# Patient Record
Sex: FEMALE | Race: WHITE | URBAN - METROPOLITAN AREA
[De-identification: names, ages, dates, MRNs, and addresses within clinical notes are randomized per-mention and may not be internally consistent; named-entity substitution may affect disease eponyms.]

---

## 2023-04-05 ENCOUNTER — NURSING HOME VISIT (OUTPATIENT)
Dept: POST ACUTE CARE | Facility: EXTERNAL LOCATION | Age: 88
End: 2023-04-05
Payer: COMMERCIAL

## 2023-04-05 DIAGNOSIS — R60.0 EDEMA OF RIGHT LOWER EXTREMITY: ICD-10-CM

## 2023-04-05 DIAGNOSIS — M79.604 RIGHT LEG PAIN: ICD-10-CM

## 2023-04-05 PROCEDURE — 99308 SBSQ NF CARE LOW MDM 20: CPT | Performed by: INTERNAL MEDICINE

## 2023-04-05 NOTE — LETTER
Patient: Ruth Wheatley  : 10/30/1929    Encounter Date: 2023    PROGRESS NOTE    Subjective  Chief complaint: Ruth Wheatley is a 89 y.o. female who is a long term care patient being seen and evaluated for RLE pain    HPI:  Patient had US of RLE due to c/o pain and edema. Us negative for DVT. She continues to have pain and edema. She continues Tylenol as needed which is effective, and uses Compression stockings. No other concerns today.       Objective  Vital signs: 129/75, 72, 18, 96%    Physical Exam  Constitutional:       General: She is not in acute distress.  Eyes:      Extraocular Movements: Extraocular movements intact.   Cardiovascular:      Rate and Rhythm: Regular rhythm.   Pulmonary:      Effort: Pulmonary effort is normal.      Breath sounds: Normal breath sounds.   Abdominal:      General: Bowel sounds are normal.      Palpations: Abdomen is soft.   Musculoskeletal:         General: Normal range of motion.      Cervical back: Neck supple.      Right lower leg: Edema present.      Left lower leg: No edema.      Comments: +pain, RLE   Neurological:      Mental Status: She is alert.   Psychiatric:         Mood and Affect: Mood normal.         Behavior: Behavior is cooperative.         Assessment/Plan  Problem List Items Addressed This Visit          Musculoskeletal    Right leg pain     PRN Tylenol         Edema of right lower extremity     Compression stockings  Elevate as tolerated          Medications, treatments, and labs reviewed  Continue medications and treatments as listed in [facility]    Scribe Attestation  By signing my name below, ITahmina Scribe   attest that this documentation has been prepared under the direction and in the presence of Corrie Desouza MD.    Provider Attestation - Scribe documentation  All medical record entries made by the Scribe were at my direction and personally dictated by me. I have reviewed the chart and agree that the record accurately reflects my  personal performance of the history, physical exam, discussion and plan.      Electronically Signed By: Corrie Desouza MD   4/7/23 12:43 PM

## 2023-04-06 PROBLEM — R60.0 EDEMA OF RIGHT LOWER EXTREMITY: Status: ACTIVE | Noted: 2023-04-06

## 2023-04-06 PROBLEM — M79.604 RIGHT LEG PAIN: Status: ACTIVE | Noted: 2023-04-06

## 2023-04-06 NOTE — PROGRESS NOTES
PROGRESS NOTE    Subjective   Chief complaint: Ruth Wheatley is a 89 y.o. female who is a long term care patient being seen and evaluated for RLE pain    HPI:  Patient had US of RLE due to c/o pain and edema. Us negative for DVT. She continues to have pain and edema. She continues Tylenol as needed which is effective, and uses Compression stockings. No other concerns today.       Objective   Vital signs: 129/75, 72, 18, 96%    Physical Exam  Constitutional:       General: She is not in acute distress.  Eyes:      Extraocular Movements: Extraocular movements intact.   Cardiovascular:      Rate and Rhythm: Regular rhythm.   Pulmonary:      Effort: Pulmonary effort is normal.      Breath sounds: Normal breath sounds.   Abdominal:      General: Bowel sounds are normal.      Palpations: Abdomen is soft.   Musculoskeletal:         General: Normal range of motion.      Cervical back: Neck supple.      Right lower leg: Edema present.      Left lower leg: No edema.      Comments: +pain, RLE   Neurological:      Mental Status: She is alert.   Psychiatric:         Mood and Affect: Mood normal.         Behavior: Behavior is cooperative.         Assessment/Plan   Problem List Items Addressed This Visit          Musculoskeletal    Right leg pain     PRN Tylenol         Edema of right lower extremity     Compression stockings  Elevate as tolerated          Medications, treatments, and labs reviewed  Continue medications and treatments as listed in [facility]    Scribe Attestation  By signing my name below, I, Leslye Yost   attest that this documentation has been prepared under the direction and in the presence of Corrie Desouza MD.    Provider Attestation - Scribe documentation  All medical record entries made by the Scribe were at my direction and personally dictated by me. I have reviewed the chart and agree that the record accurately reflects my personal performance of the history, physical exam, discussion and plan.

## 2023-04-07 ENCOUNTER — NURSING HOME VISIT (OUTPATIENT)
Dept: POST ACUTE CARE | Facility: EXTERNAL LOCATION | Age: 88
End: 2023-04-07
Payer: COMMERCIAL

## 2023-04-07 DIAGNOSIS — F32.A DEPRESSION, UNSPECIFIED DEPRESSION TYPE: ICD-10-CM

## 2023-04-07 DIAGNOSIS — I10 PRIMARY HYPERTENSION: ICD-10-CM

## 2023-04-07 DIAGNOSIS — J43.9 PULMONARY EMPHYSEMA, UNSPECIFIED EMPHYSEMA TYPE (MULTI): ICD-10-CM

## 2023-04-07 PROCEDURE — 99309 SBSQ NF CARE MODERATE MDM 30: CPT | Performed by: INTERNAL MEDICINE

## 2023-04-07 NOTE — LETTER
Patient: Ruth Wheatley  : 10/30/1929    Encounter Date: 2023    PROGRESS NOTE    Subjective  Chief complaint: Ruth Wheatley is a 89 y.o. female who is a long term care patient being seen and evaluated for monthly general medical care and follow-up.    HPI:   patient presents for general medical care and f/u.  Patient seen and examined at bedside.  No issues per nursing.  Patient has no acute complaints.   denies sob, wheezing, cough.  HTN BP at goal.  Denies chest pain and headache.    Patient with diagnosis of depression.  Denies feeling down and thoughts of harming self or others.  No acute distress.      Objective  Vital signs: 129/75, 72, 18, 96%    Physical Exam  Constitutional:       General: She is not in acute distress.  Eyes:      Extraocular Movements: Extraocular movements intact.   Pulmonary:      Effort: Pulmonary effort is normal.      Breath sounds: Normal breath sounds.   Abdominal:      General: Bowel sounds are normal.      Palpations: Abdomen is soft.   Musculoskeletal:      Cervical back: Neck supple.      Right lower leg: Edema present.      Left lower leg: Edema present.   Neurological:      Mental Status: She is alert.   Psychiatric:         Mood and Affect: Mood normal.         Behavior: Behavior is cooperative.         Assessment/Plan  Problem List Items Addressed This Visit          Respiratory    Emphysema lung (CMS/HCC)     Stable  Continue Breo inhalers            Circulatory    Primary hypertension     Monitor BP  Continue antihypertensives            Other    Depression     Mood stable  Continue antihypertensives          Medications, treatments, and labs reviewed  Continue medications and treatments as listed in [facility]    Scribe Attestation  By signing my name below, Tahmina GUSTAFSON, Scribe   attest that this documentation has been prepared under the direction and in the presence of Corrie Desouza MD.    Provider Attestation - Scribe documentation  All medical record  entries made by the Scribe were at my direction and personally dictated by me. I have reviewed the chart and agree that the record accurately reflects my personal performance of the history, physical exam, discussion and plan.      Electronically Signed By: Corrie Desouza MD   4/11/23  6:36 PM

## 2023-04-11 PROBLEM — F32.A DEPRESSION: Status: ACTIVE | Noted: 2023-04-11

## 2023-04-11 PROBLEM — I10 PRIMARY HYPERTENSION: Status: ACTIVE | Noted: 2023-04-11

## 2023-04-11 PROBLEM — J43.9 EMPHYSEMA LUNG (MULTI): Status: ACTIVE | Noted: 2023-04-11

## 2023-04-11 NOTE — PROGRESS NOTES
PROGRESS NOTE    Subjective   Chief complaint: Ruth Wheatley is a 89 y.o. female who is a long term care patient being seen and evaluated for monthly general medical care and follow-up.    HPI:   patient presents for general medical care and f/u.  Patient seen and examined at bedside.  No issues per nursing.  Patient has no acute complaints.   denies sob, wheezing, cough.  HTN BP at goal.  Denies chest pain and headache.    Patient with diagnosis of depression.  Denies feeling down and thoughts of harming self or others.  No acute distress.      Objective   Vital signs: 129/75, 72, 18, 96%    Physical Exam  Constitutional:       General: She is not in acute distress.  Eyes:      Extraocular Movements: Extraocular movements intact.   Pulmonary:      Effort: Pulmonary effort is normal.      Breath sounds: Normal breath sounds.   Abdominal:      General: Bowel sounds are normal.      Palpations: Abdomen is soft.   Musculoskeletal:      Cervical back: Neck supple.      Right lower leg: Edema present.      Left lower leg: Edema present.   Neurological:      Mental Status: She is alert.   Psychiatric:         Mood and Affect: Mood normal.         Behavior: Behavior is cooperative.         Assessment/Plan   Problem List Items Addressed This Visit          Respiratory    Emphysema lung (CMS/HCC)     Stable  Continue Breo inhalers            Circulatory    Primary hypertension     Monitor BP  Continue antihypertensives            Other    Depression     Mood stable  Continue antihypertensives          Medications, treatments, and labs reviewed  Continue medications and treatments as listed in [facility]    Scribe Attestation  By signing my name below, Tahmina GUSTAFSON Scribe   attest that this documentation has been prepared under the direction and in the presence of Corrie Desouza MD.    Provider Attestation - Scribe documentation  All medical record entries made by the Scribe were at my direction and personally dictated by  me. I have reviewed the chart and agree that the record accurately reflects my personal performance of the history, physical exam, discussion and plan.

## 2023-04-12 ENCOUNTER — NURSING HOME VISIT (OUTPATIENT)
Dept: POST ACUTE CARE | Facility: EXTERNAL LOCATION | Age: 88
End: 2023-04-12
Payer: COMMERCIAL

## 2023-04-12 DIAGNOSIS — M25.551 RIGHT HIP PAIN: ICD-10-CM

## 2023-04-12 PROCEDURE — 99308 SBSQ NF CARE LOW MDM 20: CPT | Performed by: INTERNAL MEDICINE

## 2023-04-12 NOTE — LETTER
Patient: Ruth Wheatley  : 10/30/1929    Encounter Date: 2023    PROGRESS NOTE    Subjective  Chief complaint: Ruth Wheatley is a 89 y.o. female who is a long term care patient being seen and evaluated for  right hip pain    HPI:   patient presented with complaints of right hip pain.  X-ray obtained and negative.  Presently she reports that the pain is improved.   denies trauma or fall. No new issues or concerns.  No acute distress.      Objective  Vital signs:  127/76, 98%    Physical Exam  Cardiovascular:      Rate and Rhythm: Normal rate and regular rhythm.   Musculoskeletal:         General: Normal range of motion.      Cervical back: Normal range of motion.      Comments:  right hip +pain, improved         Assessment/Plan  Problem List Items Addressed This Visit          Musculoskeletal    Right hip pain     PRN Tylenol   improved, x-ray negative          Medications, treatments, and labs reviewed  Continue medications and treatments as listed in [facility]    Scribe Attestation  By signing my name below, ITahmina Scribe   attest that this documentation has been prepared under the direction and in the presence of Corrie Desouza MD.    Provider Attestation - Scribe documentation  All medical record entries made by the Scribe were at my direction and personally dictated by me. I have reviewed the chart and agree that the record accurately reflects my personal performance of the history, physical exam, discussion and plan.      Electronically Signed By: Corrie Desouza MD   23 12:04 PM

## 2023-04-16 PROBLEM — M25.551 RIGHT HIP PAIN: Status: ACTIVE | Noted: 2023-04-06

## 2023-04-16 NOTE — PROGRESS NOTES
PROGRESS NOTE    Subjective   Chief complaint: Ruth Wheatley is a 89 y.o. female who is a long term care patient being seen and evaluated for  right hip pain    HPI:   patient presented with complaints of right hip pain.  X-ray obtained and negative.  Presently she reports that the pain is improved.   denies trauma or fall. No new issues or concerns.  No acute distress.      Objective   Vital signs:  127/76, 98%    Physical Exam  Cardiovascular:      Rate and Rhythm: Normal rate and regular rhythm.   Musculoskeletal:         General: Normal range of motion.      Cervical back: Normal range of motion.      Comments:  right hip +pain, improved         Assessment/Plan   Problem List Items Addressed This Visit          Musculoskeletal    Right hip pain     PRN Tylenol   improved, x-ray negative          Medications, treatments, and labs reviewed  Continue medications and treatments as listed in [facility]    Scribe Attestation  By signing my name below, I, Leslye Yost   attest that this documentation has been prepared under the direction and in the presence of Corrie Desouza MD.    Provider Attestation - Scribe documentation  All medical record entries made by the Scribe were at my direction and personally dictated by me. I have reviewed the chart and agree that the record accurately reflects my personal performance of the history, physical exam, discussion and plan.

## 2023-05-05 ENCOUNTER — NURSING HOME VISIT (OUTPATIENT)
Dept: POST ACUTE CARE | Facility: EXTERNAL LOCATION | Age: 88
End: 2023-05-05
Payer: COMMERCIAL

## 2023-05-05 DIAGNOSIS — I10 PRIMARY HYPERTENSION: ICD-10-CM

## 2023-05-05 DIAGNOSIS — J43.9 PULMONARY EMPHYSEMA, UNSPECIFIED EMPHYSEMA TYPE (MULTI): ICD-10-CM

## 2023-05-05 DIAGNOSIS — F32.A DEPRESSION, UNSPECIFIED DEPRESSION TYPE: ICD-10-CM

## 2023-05-05 PROCEDURE — 99309 SBSQ NF CARE MODERATE MDM 30: CPT | Performed by: INTERNAL MEDICINE

## 2023-05-05 NOTE — LETTER
Patient: Ruth Wheatley  : 10/30/1929    Encounter Date: 2023    PROGRESS NOTE    Subjective  Chief complaint: Ruth Wheatley is a 89 y.o. female who is a long term care patient being seen and evaluated for monthly general medical care and follow-up.    HPI:   patient presents for general medical care and f/u.  Patient seen and examined at bedside.  No issues per nursing.  Patient has no acute complaints.   denies sob, wheezing, cough.  HTN BP at goal.  Denies chest pain and headache.    Patient with diagnosis of depression.  Denies feeling down and thoughts of harming self or others.  No acute distress.      Objective  Vital signs: 127/74, 96%    Physical Exam  Constitutional:       General: She is not in acute distress.  Eyes:      Extraocular Movements: Extraocular movements intact.   Pulmonary:      Effort: Pulmonary effort is normal.      Breath sounds: Normal breath sounds.   Abdominal:      General: Bowel sounds are normal.      Palpations: Abdomen is soft.   Musculoskeletal:      Cervical back: Neck supple.      Right lower leg: Edema present.      Left lower leg: Edema present.   Neurological:      Mental Status: She is alert.   Psychiatric:         Mood and Affect: Mood normal.         Behavior: Behavior is cooperative.         Assessment/Plan  Problem List Items Addressed This Visit          Respiratory    Emphysema lung (CMS/HCC)     Stable  Continue Breo inhalers            Circulatory    Primary hypertension     Monitor BP  Continue antihypertensives            Other    Depression     Mood stable  Continue antihypertensives        Medications, treatments, and labs reviewed  Continue medications and treatments as listed in [facility]    Scribe Attestation  By signing my name below, Tahmina GUSTAFSON, Leslye   attest that this documentation has been prepared under the direction and in the presence of Corrie Desouza MD.    Provider Attestation - Scribe documentation  All medical record entries made by  the Scribe were at my direction and personally dictated by me. I have reviewed the chart and agree that the record accurately reflects my personal performance of the history, physical exam, discussion and plan.      Electronically Signed By: Corrie Desouza MD   5/8/23  4:06 PM

## 2023-05-08 NOTE — PROGRESS NOTES
PROGRESS NOTE    Subjective   Chief complaint: Ruth Wheatley is a 89 y.o. female who is a long term care patient being seen and evaluated for monthly general medical care and follow-up.    HPI:   patient presents for general medical care and f/u.  Patient seen and examined at bedside.  No issues per nursing.  Patient has no acute complaints.   denies sob, wheezing, cough.  HTN BP at goal.  Denies chest pain and headache.    Patient with diagnosis of depression.  Denies feeling down and thoughts of harming self or others.  No acute distress.      Objective   Vital signs: 127/74, 96%    Physical Exam  Constitutional:       General: She is not in acute distress.  Eyes:      Extraocular Movements: Extraocular movements intact.   Pulmonary:      Effort: Pulmonary effort is normal.      Breath sounds: Normal breath sounds.   Abdominal:      General: Bowel sounds are normal.      Palpations: Abdomen is soft.   Musculoskeletal:      Cervical back: Neck supple.      Right lower leg: Edema present.      Left lower leg: Edema present.   Neurological:      Mental Status: She is alert.   Psychiatric:         Mood and Affect: Mood normal.         Behavior: Behavior is cooperative.         Assessment/Plan   Problem List Items Addressed This Visit          Respiratory    Emphysema lung (CMS/HCC)     Stable  Continue Breo inhalers            Circulatory    Primary hypertension     Monitor BP  Continue antihypertensives            Other    Depression     Mood stable  Continue antihypertensives        Medications, treatments, and labs reviewed  Continue medications and treatments as listed in [facility]    Scribe Attestation  By signing my name below, I, Leslye Yost   attest that this documentation has been prepared under the direction and in the presence of Corrie Desouza MD.    Provider Attestation - Scribe documentation  All medical record entries made by the Scribe were at my direction and personally dictated by me. I have  reviewed the chart and agree that the record accurately reflects my personal performance of the history, physical exam, discussion and plan.

## 2023-06-06 ENCOUNTER — NURSING HOME VISIT (OUTPATIENT)
Dept: POST ACUTE CARE | Facility: EXTERNAL LOCATION | Age: 88
End: 2023-06-06
Payer: MEDICARE

## 2023-06-06 DIAGNOSIS — R19.7 DIARRHEA, UNSPECIFIED TYPE: Primary | ICD-10-CM

## 2023-06-06 DIAGNOSIS — I10 PRIMARY HYPERTENSION: ICD-10-CM

## 2023-06-06 PROCEDURE — 99308 SBSQ NF CARE LOW MDM 20: CPT | Performed by: NURSE PRACTITIONER

## 2023-06-06 NOTE — LETTER
Patient: Ruth Wheatley  : 10/30/1929    Encounter Date: 2023    PROGRESS NOTE    Subjective  Chief complaint: Ruth Wheatley is a 89 y.o. female who is a long term care patient being seen and evaluated for diarrhea    HPI:  Patient presents with complaint of diarrhea.  She states that she is having multiple watery stools per day.  She is on Colace.  Patient denies abdominal pain nausea and vomiting.      Objective  Vital signs: 118/78, 17, 97.6, 78, 96%    Physical Exam  Constitutional:       General: She is not in acute distress.  Eyes:      Extraocular Movements: Extraocular movements intact.   Cardiovascular:      Rate and Rhythm: Normal rate.   Pulmonary:      Effort: Pulmonary effort is normal.   Abdominal:      General: Bowel sounds are normal. There is no distension.      Palpations: Abdomen is soft.      Tenderness: There is no abdominal tenderness.   Musculoskeletal:      Cervical back: Neck supple.      Right lower leg: No edema.      Left lower leg: No edema.   Neurological:      Mental Status: She is alert.   Psychiatric:         Mood and Affect: Mood normal.         Behavior: Behavior is cooperative.         Assessment/Plan  Problem List Items Addressed This Visit       Diarrhea - Primary     Obtain stool for C. difficile  Discontinue Colace  Start Questran  Obtain BMP and CBC         Primary hypertension     Controlled  Continue to monitor          Medications, treatments, and labs reviewed  Continue medications and treatments as listed in EMR    ISAURO Lorenzana      Electronically Signed By: ISAURO Lorenzana   23  4:47 PM

## 2023-06-07 ENCOUNTER — NURSING HOME VISIT (OUTPATIENT)
Dept: POST ACUTE CARE | Facility: EXTERNAL LOCATION | Age: 88
End: 2023-06-07
Payer: MEDICARE

## 2023-06-07 DIAGNOSIS — F32.A DEPRESSION, UNSPECIFIED DEPRESSION TYPE: ICD-10-CM

## 2023-06-07 DIAGNOSIS — I10 PRIMARY HYPERTENSION: ICD-10-CM

## 2023-06-07 DIAGNOSIS — R19.7 DIARRHEA, UNSPECIFIED TYPE: ICD-10-CM

## 2023-06-07 DIAGNOSIS — J43.9 PULMONARY EMPHYSEMA, UNSPECIFIED EMPHYSEMA TYPE (MULTI): ICD-10-CM

## 2023-06-07 PROCEDURE — 99309 SBSQ NF CARE MODERATE MDM 30: CPT | Performed by: INTERNAL MEDICINE

## 2023-06-07 NOTE — LETTER
Patient: Ruth Wheatley  : 10/30/1929    Encounter Date: 2023    PROGRESS NOTE    Subjective  Chief complaint: Ruth Wheatley is a 89 y.o. female who is a long term care patient being seen and evaluated for monthly general medical care and follow-up, loose stools    HPI:   patient presents for general medical care and f/u.  Patient seen and examined at bedside.  Nursing reports that patient has been having complaints of loose stools. Denies nausea, vomiting or fever.  She  denies sob, wheezing, cough.  HTN,  Denies chest pain and headache.    Patient with diagnosis of depression.  Denies feeling down and thoughts of harming self or others.  No acute distress.      Objective  Vital signs: 131/74, 96%    Physical Exam  Constitutional:       General: She is not in acute distress.  Eyes:      Extraocular Movements: Extraocular movements intact.   Pulmonary:      Effort: Pulmonary effort is normal.      Breath sounds: Normal breath sounds.   Abdominal:      General: Bowel sounds are normal.      Palpations: Abdomen is soft.   Musculoskeletal:      Cervical back: Neck supple.      Right lower leg: Edema present.      Left lower leg: Edema present.   Neurological:      Mental Status: She is alert.   Psychiatric:         Mood and Affect: Mood normal.         Behavior: Behavior is cooperative.         Assessment/Plan  Problem List Items Addressed This Visit          Respiratory    Emphysema lung (CMS/HCC)     Stable  Continue Breo inhalers            Circulatory    Primary hypertension     Controlled  Continue to monitor            Other    Depression     Mood stable  Continue antihypertensives         Diarrhea     Obtain stool for C. difficile  Discontinued Colace  Started Questran  Ordered BMP and CBC, pending        Medications, treatments, and labs reviewed  Continue medications and treatments as listed in [facility]    Scribe Attestation  By signing my name below, I, Tahmina Mazariegos, Scribe   attest that this  documentation has been prepared under the direction and in the presence of Corrie Desouza MD.    Provider Attestation - Scribe documentation  All medical record entries made by the Scribe were at my direction and personally dictated by me. I have reviewed the chart and agree that the record accurately reflects my personal performance of the history, physical exam, discussion and plan.      Electronically Signed By: Corrie Desouza MD   6/9/23  7:04 PM

## 2023-06-07 NOTE — PROGRESS NOTES
PROGRESS NOTE    Subjective   Chief complaint: Ruth Wheatley is a 89 y.o. female who is a long term care patient being seen and evaluated for diarrhea    HPI:  Patient presents with complaint of diarrhea.  She states that she is having multiple watery stools per day.  She is on Colace.  Patient denies abdominal pain nausea and vomiting.      Objective   Vital signs: 118/78, 17, 97.6, 78, 96%    Physical Exam  Constitutional:       General: She is not in acute distress.  Eyes:      Extraocular Movements: Extraocular movements intact.   Cardiovascular:      Rate and Rhythm: Normal rate.   Pulmonary:      Effort: Pulmonary effort is normal.   Abdominal:      General: Bowel sounds are normal. There is no distension.      Palpations: Abdomen is soft.      Tenderness: There is no abdominal tenderness.   Musculoskeletal:      Cervical back: Neck supple.      Right lower leg: No edema.      Left lower leg: No edema.   Neurological:      Mental Status: She is alert.   Psychiatric:         Mood and Affect: Mood normal.         Behavior: Behavior is cooperative.         Assessment/Plan   Problem List Items Addressed This Visit       Diarrhea - Primary     Obtain stool for C. difficile  Discontinue Colace  Start Questran  Obtain BMP and CBC         Primary hypertension     Controlled  Continue to monitor          Medications, treatments, and labs reviewed  Continue medications and treatments as listed in EMR    Gi Piña, APRN-CNP

## 2023-06-08 PROBLEM — R19.7 DIARRHEA: Status: ACTIVE | Noted: 2023-06-08

## 2023-06-09 NOTE — PROGRESS NOTES
PROGRESS NOTE    Subjective   Chief complaint: Ruth Wheatley is a 89 y.o. female who is a long term care patient being seen and evaluated for monthly general medical care and follow-up, loose stools    HPI:   patient presents for general medical care and f/u.  Patient seen and examined at bedside.  Nursing reports that patient has been having complaints of loose stools. Denies nausea, vomiting or fever.  She  denies sob, wheezing, cough.  HTN,  Denies chest pain and headache.    Patient with diagnosis of depression.  Denies feeling down and thoughts of harming self or others.  No acute distress.      Objective   Vital signs: 131/74, 96%    Physical Exam  Constitutional:       General: She is not in acute distress.  Eyes:      Extraocular Movements: Extraocular movements intact.   Pulmonary:      Effort: Pulmonary effort is normal.      Breath sounds: Normal breath sounds.   Abdominal:      General: Bowel sounds are normal.      Palpations: Abdomen is soft.   Musculoskeletal:      Cervical back: Neck supple.      Right lower leg: Edema present.      Left lower leg: Edema present.   Neurological:      Mental Status: She is alert.   Psychiatric:         Mood and Affect: Mood normal.         Behavior: Behavior is cooperative.         Assessment/Plan   Problem List Items Addressed This Visit          Respiratory    Emphysema lung (CMS/HCC)     Stable  Continue Breo inhalers            Circulatory    Primary hypertension     Controlled  Continue to monitor            Other    Depression     Mood stable  Continue antihypertensives         Diarrhea     Obtain stool for C. difficile  Discontinued Colace  Started Questran  Ordered BMP and CBC, pending        Medications, treatments, and labs reviewed  Continue medications and treatments as listed in [facility]    Scribe Attestation  By signing my name below, Tahmina GUSTAFSON, Scribe   attest that this documentation has been prepared under the direction and in the presence of  Corrie Desouza MD.    Provider Attestation - Scribe documentation  All medical record entries made by the Scribe were at my direction and personally dictated by me. I have reviewed the chart and agree that the record accurately reflects my personal performance of the history, physical exam, discussion and plan.

## 2023-06-09 NOTE — ASSESSMENT & PLAN NOTE
Obtain stool for C. difficile  Discontinued Colace  Started Questran  Ordered BMP and CBC, pending

## 2023-06-23 ENCOUNTER — NURSING HOME VISIT (OUTPATIENT)
Dept: POST ACUTE CARE | Facility: EXTERNAL LOCATION | Age: 88
End: 2023-06-23
Payer: MEDICARE

## 2023-06-23 DIAGNOSIS — R19.7 DIARRHEA, UNSPECIFIED TYPE: ICD-10-CM

## 2023-06-23 PROCEDURE — 99308 SBSQ NF CARE LOW MDM 20: CPT | Performed by: INTERNAL MEDICINE

## 2023-06-23 NOTE — PROGRESS NOTES
PROGRESS NOTE    Subjective   Chief complaint: Ruth Wheatley is a 89 y.o. female who is a long term care patient being seen and evaluated for diarrhea    HPI:  Patient has been having complaints of loose stools. Her stool softeners have been discontinued and she was started on Questran. Labs and Cdiff PCR are pending. Denies nausea or vomiting at this time. No acute distress or further concerns at this time.        Objective   Vital signs: 132/64, 98%    Physical Exam  Constitutional:       General: She is not in acute distress.  Eyes:      Extraocular Movements: Extraocular movements intact.   Cardiovascular:      Rate and Rhythm: Normal rate and regular rhythm.   Pulmonary:      Effort: Pulmonary effort is normal.      Breath sounds: Normal breath sounds.   Abdominal:      General: Bowel sounds are normal.      Palpations: Abdomen is soft.   Musculoskeletal:      Cervical back: Neck supple.      Right lower leg: No edema.      Left lower leg: No edema.   Neurological:      Mental Status: She is alert.   Psychiatric:         Mood and Affect: Mood normal.         Behavior: Behavior is cooperative.         Assessment/Plan   Problem List Items Addressed This Visit          Other    Diarrhea     stool for C. Difficile, pending  Discontinued Colace  Started Questran  Ordered BMP and CBC, pending          Medications, treatments, and labs reviewed  Continue medications and treatments as listed in Psychiatric    Scribe Attestation  By signing my name below, I, Leslye Yost   attest that this documentation has been prepared under the direction and in the presence of Corrie Desouza MD.    Provider Attestation - Scribe documentation  All medical record entries made by the Scribe were at my direction and personally dictated by me. I have reviewed the chart and agree that the record accurately reflects my personal performance of the history, physical exam, discussion and plan.    1. Diarrhea, unspecified type

## 2023-06-23 NOTE — ASSESSMENT & PLAN NOTE
stool for C. Difficile, pending  Discontinued Colace  Started Questran  Ordered BMP and CBC, pending

## 2023-06-23 NOTE — LETTER
Patient: Ruth Wheatley  : 10/30/1929    Encounter Date: 2023    PROGRESS NOTE    Subjective  Chief complaint: Ruth Wheatley is a 89 y.o. female who is a long term care patient being seen and evaluated for diarrhea    HPI:  Patient has been having complaints of loose stools. Her stool softeners have been discontinued and she was started on Questran. Labs and Cdiff PCR are pending. Denies nausea or vomiting at this time. No acute distress or further concerns at this time.        Objective  Vital signs: 132/64, 98%    Physical Exam  Constitutional:       General: She is not in acute distress.  Eyes:      Extraocular Movements: Extraocular movements intact.   Cardiovascular:      Rate and Rhythm: Normal rate and regular rhythm.   Pulmonary:      Effort: Pulmonary effort is normal.      Breath sounds: Normal breath sounds.   Abdominal:      General: Bowel sounds are normal.      Palpations: Abdomen is soft.   Musculoskeletal:      Cervical back: Neck supple.      Right lower leg: No edema.      Left lower leg: No edema.   Neurological:      Mental Status: She is alert.   Psychiatric:         Mood and Affect: Mood normal.         Behavior: Behavior is cooperative.         Assessment/Plan  Problem List Items Addressed This Visit          Other    Diarrhea     stool for C. Difficile, pending  Discontinued Colace  Started Questran  Ordered BMP and CBC, pending          Medications, treatments, and labs reviewed  Continue medications and treatments as listed in [facility]    Scribe Attestation  By signing my name below, I, Leslye Yost   attest that this documentation has been prepared under the direction and in the presence of Corrie Desouza MD.    Provider Attestation - Scribe documentation  All medical record entries made by the Scribe were at my direction and personally dictated by me. I have reviewed the chart and agree that the record accurately reflects my personal performance of the history, physical  exam, discussion and plan.    1. Diarrhea, unspecified type               Electronically Signed By: Corrie Desouza MD   6/23/23  5:58 PM

## 2023-06-28 ENCOUNTER — NURSING HOME VISIT (OUTPATIENT)
Dept: POST ACUTE CARE | Facility: EXTERNAL LOCATION | Age: 88
End: 2023-06-28
Payer: MEDICARE

## 2023-06-28 DIAGNOSIS — I10 PRIMARY HYPERTENSION: ICD-10-CM

## 2023-06-28 DIAGNOSIS — R19.7 DIARRHEA, UNSPECIFIED TYPE: ICD-10-CM

## 2023-06-28 PROCEDURE — 99308 SBSQ NF CARE LOW MDM 20: CPT | Performed by: INTERNAL MEDICINE

## 2023-06-28 NOTE — PROGRESS NOTES
PROGRESS NOTE    Subjective   Chief complaint: Ruth Wheatley is a 89 y.o. female who is a long term care patient being seen and evaluated for diarrhea    HPI:  Patient continues on Questran for diarrhea. The loose stools have resolved. She is feeling better and has no new concerns. Denies stomach pain gas or constipation. Denies chest pain or headache.       Objective   Vital signs: 123/74, 98%    Physical Exam  Constitutional:       General: She is not in acute distress.  Eyes:      Extraocular Movements: Extraocular movements intact.   Cardiovascular:      Rate and Rhythm: Normal rate and regular rhythm.   Pulmonary:      Effort: Pulmonary effort is normal.      Breath sounds: Normal breath sounds.   Abdominal:      General: Bowel sounds are normal.      Palpations: Abdomen is soft.   Musculoskeletal:      Cervical back: Neck supple.      Right lower leg: No edema.      Left lower leg: No edema.   Neurological:      Mental Status: She is alert.   Psychiatric:         Mood and Affect: Mood normal.         Behavior: Behavior is cooperative.         Assessment/Plan   Problem List Items Addressed This Visit          Cardiac and Vasculature    Primary hypertension     Controlled  Continue to monitor            Gastrointestinal and Abdominal    Diarrhea     Continue Questran            Medications, treatments, and labs reviewed  Continue medications and treatments as listed in [facility]    Scribe Attestation  By signing my name below, I, Leslye Yost   attest that this documentation has been prepared under the direction and in the presence of Corrie Desouza MD.    Provider Attestation - Scribe documentation  All medical record entries made by the Scribe were at my direction and personally dictated by me. I have reviewed the chart and agree that the record accurately reflects my personal performance of the history, physical exam, discussion and plan.    1. Diarrhea, unspecified type        2. Primary hypertension

## 2023-06-28 NOTE — LETTER
Patient: Ruth Wheatley  : 10/30/1929    Encounter Date: 2023    PROGRESS NOTE    Subjective  Chief complaint: Ruth Wheatley is a 89 y.o. female who is a long term care patient being seen and evaluated for diarrhea    HPI:  Patient continues on Questran for diarrhea. The loose stools have resolved. She is feeling better and has no new concerns. Denies stomach pain gas or constipation. Denies chest pain or headache.       Objective  Vital signs: 123/74, 98%    Physical Exam  Constitutional:       General: She is not in acute distress.  Eyes:      Extraocular Movements: Extraocular movements intact.   Cardiovascular:      Rate and Rhythm: Normal rate and regular rhythm.   Pulmonary:      Effort: Pulmonary effort is normal.      Breath sounds: Normal breath sounds.   Abdominal:      General: Bowel sounds are normal.      Palpations: Abdomen is soft.   Musculoskeletal:      Cervical back: Neck supple.      Right lower leg: No edema.      Left lower leg: No edema.   Neurological:      Mental Status: She is alert.   Psychiatric:         Mood and Affect: Mood normal.         Behavior: Behavior is cooperative.         Assessment/Plan  Problem List Items Addressed This Visit          Cardiac and Vasculature    Primary hypertension     Controlled  Continue to monitor            Gastrointestinal and Abdominal    Diarrhea     Continue Questran            Medications, treatments, and labs reviewed  Continue medications and treatments as listed in [facility]    Scribe Attestation  By signing my name below, I, Leslye Yost   attest that this documentation has been prepared under the direction and in the presence of Corrie Desouza MD.    Provider Attestation - Scribe documentation  All medical record entries made by the Scribe were at my direction and personally dictated by me. I have reviewed the chart and agree that the record accurately reflects my personal performance of the history, physical exam, discussion and  plan.    1. Diarrhea, unspecified type        2. Primary hypertension               Electronically Signed By: Corrie Desouza MD   6/28/23  5:32 PM

## 2023-06-28 NOTE — ASSESSMENT & PLAN NOTE
Continue Questran    
Controlled  Continue to monitor  
Simple: Patient demonstrates quick and easy understanding

## 2023-06-29 ENCOUNTER — NURSING HOME VISIT (OUTPATIENT)
Dept: POST ACUTE CARE | Facility: EXTERNAL LOCATION | Age: 88
End: 2023-06-29
Payer: MEDICARE

## 2023-06-29 DIAGNOSIS — I10 PRIMARY HYPERTENSION: ICD-10-CM

## 2023-06-29 DIAGNOSIS — R19.7 DIARRHEA, UNSPECIFIED TYPE: Primary | ICD-10-CM

## 2023-06-29 DIAGNOSIS — J43.9 PULMONARY EMPHYSEMA, UNSPECIFIED EMPHYSEMA TYPE (MULTI): ICD-10-CM

## 2023-06-29 PROCEDURE — 99308 SBSQ NF CARE LOW MDM 20: CPT | Performed by: NURSE PRACTITIONER

## 2023-06-29 NOTE — LETTER
Patient: Ruth Wheatley  : 10/30/1929    Encounter Date: 2023    PROGRESS NOTE    Subjective  Chief complaint: Ruth Wheatley is a 89 y.o. female who is a long term care patient being seen and evaluated for follow-up of diarrhea.    HPI:   Patient being seen for follow-up of diarrhea.  Discontinued Colace, started Questran, ordered BMP CBC and stool for C. difficile on .  Patient still has diarrhea.  Questran currently as needed.  Patient denies abdominal pain, nausea, vomiting, fever and chills.      Objective  Vital signs:   18, 131/79, 97.9, 78, 96%  Physical Exam  Constitutional:       General: She is not in acute distress.  Eyes:      Extraocular Movements: Extraocular movements intact.   Cardiovascular:      Rate and Rhythm: Normal rate.   Pulmonary:      Effort: Pulmonary effort is normal.   Abdominal:      General: Bowel sounds are normal. There is no distension.      Palpations: Abdomen is soft.      Tenderness: There is no abdominal tenderness.   Musculoskeletal:      Cervical back: Neck supple.      Right lower leg: No edema.      Left lower leg: No edema.   Neurological:      Mental Status: She is alert.   Psychiatric:         Mood and Affect: Mood normal.         Behavior: Behavior is cooperative.         Assessment/Plan  Problem List Items Addressed This Visit       Emphysema lung (CMS/HCC)     Stable  Continue Breo inhalers         Primary hypertension     Controlled  Continue to monitor         Diarrhea - Primary     Obtain stool culture and c-diff  Change questran to routine every day  Monitor          Medications, treatments, and labs reviewed  Continue medications and treatments as listed in EMR    Scribe Attestation  I, Leslye Sifuentes   attest that this documentation has been prepared under the direction and in the presence of ISAURO Lorenzana    Provider Attestation - Scribe documentation  All medical record entries made by the Scribe were at my direction and  personally dictated by me. I have reviewed the chart and agree that the record accurately reflects my personal performance of the history, physical exam, discussion and plan.   ISAURO Lorenzana        Electronically Signed By: ISAURO Lorenzana   7/4/23  8:40 PM

## 2023-07-03 NOTE — PROGRESS NOTES
PROGRESS NOTE    Subjective   Chief complaint: Ruth Wheatley is a 89 y.o. female who is a long term care patient being seen and evaluated for follow-up of diarrhea.    HPI:   Patient being seen for follow-up of diarrhea.  Discontinued Colace, started Questran, ordered BMP CBC and stool for C. difficile on 6/6.  Patient still has diarrhea.  Questran currently as needed.  Patient denies abdominal pain, nausea, vomiting, fever and chills.      Objective   Vital signs:   18, 131/79, 97.9, 78, 96%  Physical Exam  Constitutional:       General: She is not in acute distress.  Eyes:      Extraocular Movements: Extraocular movements intact.   Cardiovascular:      Rate and Rhythm: Normal rate.   Pulmonary:      Effort: Pulmonary effort is normal.   Abdominal:      General: Bowel sounds are normal. There is no distension.      Palpations: Abdomen is soft.      Tenderness: There is no abdominal tenderness.   Musculoskeletal:      Cervical back: Neck supple.      Right lower leg: No edema.      Left lower leg: No edema.   Neurological:      Mental Status: She is alert.   Psychiatric:         Mood and Affect: Mood normal.         Behavior: Behavior is cooperative.         Assessment/Plan   Problem List Items Addressed This Visit       Emphysema lung (CMS/HCC)     Stable  Continue Breo inhalers         Primary hypertension     Controlled  Continue to monitor         Diarrhea - Primary     Obtain stool culture and c-diff  Change questran to routine every day  Monitor          Medications, treatments, and labs reviewed  Continue medications and treatments as listed in EMR    Scribe Attestation  IJanie Scribe   attest that this documentation has been prepared under the direction and in the presence of JANE Lorenzana-CNP    Provider Attestation - Scribe documentation  All medical record entries made by the Scribe were at my direction and personally dictated by me. I have reviewed the chart and agree that the record  accurately reflects my personal performance of the history, physical exam, discussion and plan.   Gi Piña, APRN-CNP

## 2023-07-07 ENCOUNTER — NURSING HOME VISIT (OUTPATIENT)
Dept: POST ACUTE CARE | Facility: EXTERNAL LOCATION | Age: 88
End: 2023-07-07
Payer: MEDICARE

## 2023-07-07 DIAGNOSIS — F32.A DEPRESSION, UNSPECIFIED DEPRESSION TYPE: ICD-10-CM

## 2023-07-07 DIAGNOSIS — J43.9 PULMONARY EMPHYSEMA, UNSPECIFIED EMPHYSEMA TYPE (MULTI): ICD-10-CM

## 2023-07-07 DIAGNOSIS — I10 PRIMARY HYPERTENSION: ICD-10-CM

## 2023-07-07 PROCEDURE — 99309 SBSQ NF CARE MODERATE MDM 30: CPT | Performed by: INTERNAL MEDICINE

## 2023-07-07 NOTE — PROGRESS NOTES
PROGRESS NOTE    Subjective   Chief complaint: Ruth Wheatley is a 89 y.o. female who is a long term care patient being seen and evaluated for monthly general medical care and follow-up, loose stools    HPI:   patient presents for general medical care and f/u.  Patient seen and examined at bedside.  She  denies sob, wheezing, cough.  HTN,  Denies chest pain and headache.    Patient with diagnosis of depression.  Denies feeling down and thoughts of harming self or others.  No acute distress.      Objective   Vital signs: 131/74, 96%    Physical Exam  Constitutional:       General: She is not in acute distress.  Eyes:      Extraocular Movements: Extraocular movements intact.   Pulmonary:      Effort: Pulmonary effort is normal.      Breath sounds: Normal breath sounds.   Abdominal:      General: Bowel sounds are normal.      Palpations: Abdomen is soft.   Musculoskeletal:      Cervical back: Neck supple.      Right lower leg: Edema present.      Left lower leg: Edema present.   Neurological:      Mental Status: She is alert.   Psychiatric:         Mood and Affect: Mood normal.         Behavior: Behavior is cooperative.         Assessment/Plan   Problem List Items Addressed This Visit          Cardiac and Vasculature    Primary hypertension     Controlled  Continue to monitor            Mental Health    Depression     Mood stable  Continue antihypertensives            Pulmonary and Pneumonias    Emphysema lung (CMS/HCC)     Stable  Continue Breo inhalers        Medications, treatments, and labs reviewed  Continue medications and treatments as listed in [facility]    Scribe Attestation  By signing my name below, I, Leslye Yost   attest that this documentation has been prepared under the direction and in the presence of Corrie Desouza MD.    Provider Attestation - Scribe documentation  All medical record entries made by the Scribe were at my direction and personally dictated by me. I have reviewed the chart and  agree that the record accurately reflects my personal performance of the history, physical exam, discussion and plan.

## 2023-07-07 NOTE — LETTER
Patient: Ruth Wheatley  : 10/30/1929    Encounter Date: 2023    PROGRESS NOTE    Subjective  Chief complaint: Ruth Wheatley is a 89 y.o. female who is a long term care patient being seen and evaluated for monthly general medical care and follow-up, loose stools    HPI:   patient presents for general medical care and f/u.  Patient seen and examined at bedside.  She  denies sob, wheezing, cough.  HTN,  Denies chest pain and headache.    Patient with diagnosis of depression.  Denies feeling down and thoughts of harming self or others.  No acute distress.      Objective  Vital signs: 131/74, 96%    Physical Exam  Constitutional:       General: She is not in acute distress.  Eyes:      Extraocular Movements: Extraocular movements intact.   Pulmonary:      Effort: Pulmonary effort is normal.      Breath sounds: Normal breath sounds.   Abdominal:      General: Bowel sounds are normal.      Palpations: Abdomen is soft.   Musculoskeletal:      Cervical back: Neck supple.      Right lower leg: Edema present.      Left lower leg: Edema present.   Neurological:      Mental Status: She is alert.   Psychiatric:         Mood and Affect: Mood normal.         Behavior: Behavior is cooperative.         Assessment/Plan  Problem List Items Addressed This Visit          Cardiac and Vasculature    Primary hypertension     Controlled  Continue to monitor            Mental Health    Depression     Mood stable  Continue antihypertensives            Pulmonary and Pneumonias    Emphysema lung (CMS/HCC)     Stable  Continue Breo inhalers        Medications, treatments, and labs reviewed  Continue medications and treatments as listed in [facility]    Scribe Attestation  By signing my name below, Tahmina GUSTAFSON Scribe   attest that this documentation has been prepared under the direction and in the presence of Corrie Desouza MD.    Provider Attestation - Scribe documentation  All medical record entries made by the Scribe were at my  direction and personally dictated by me. I have reviewed the chart and agree that the record accurately reflects my personal performance of the history, physical exam, discussion and plan.      Electronically Signed By: Corrie Desouza MD   7/7/23  5:29 PM

## 2023-07-19 ENCOUNTER — NURSING HOME VISIT (OUTPATIENT)
Dept: POST ACUTE CARE | Facility: EXTERNAL LOCATION | Age: 88
End: 2023-07-19
Payer: MEDICARE

## 2023-07-19 DIAGNOSIS — R60.0 EDEMA OF LEFT LOWER EXTREMITY: ICD-10-CM

## 2023-07-19 DIAGNOSIS — I10 PRIMARY HYPERTENSION: ICD-10-CM

## 2023-07-19 PROCEDURE — 99308 SBSQ NF CARE LOW MDM 20: CPT | Performed by: INTERNAL MEDICINE

## 2023-07-19 NOTE — LETTER
Patient: Ruth Wheatley  : 10/30/1929    Encounter Date: 2023    PROGRESS NOTE    Subjective  Chief complaint: Ruth Wheatley is a 89 y.o. female who is a long term care patient being seen and evaluated for   Edema    HPI:   patient presents with extreme edema to the left lower extremity   Extending up to the thigh.  denies fall or trauma does endorse pain associated.  No other concerns at this time.  No acute distress.  denies chest pain or headache.      Objective  Vital signs:  126/72, 98%    Physical Exam  Constitutional:       General: She is not in acute distress.  Eyes:      Extraocular Movements: Extraocular movements intact.   Cardiovascular:      Rate and Rhythm: Normal rate and regular rhythm.   Pulmonary:      Effort: Pulmonary effort is normal.      Breath sounds: Normal breath sounds.   Abdominal:      General: Bowel sounds are normal.      Palpations: Abdomen is soft.   Musculoskeletal:      Cervical back: Neck supple.      Right lower leg: No edema.      Left lower leg: Edema present.      Comments: LLE Extreme edema extending up to the thigh   Neurological:      Mental Status: She is alert.   Psychiatric:         Mood and Affect: Mood normal.         Behavior: Behavior is cooperative.         Assessment/Plan  Problem List Items Addressed This Visit          Cardiac and Vasculature    Primary hypertension     Controlled  Continue to monitor            Symptoms and Signs    Edema of left lower extremity      obtain ultrasound  to rule out DVT          Medications, treatments, and labs reviewed  Continue medications and treatments as listed in [facility]    Scribe Attestation  By signing my name below, ITahmina Scribe   attest that this documentation has been prepared under the direction and in the presence of Corrie Desouza MD.    Provider Attestation - Scribe documentation  All medical record entries made by the Scribe were at my direction and personally dictated by me. I have reviewed  the chart and agree that the record accurately reflects my personal performance of the history, physical exam, discussion and plan.    1. Edema of left lower extremity        2. Primary hypertension               Electronically Signed By: Corrie Desouza MD   7/20/23 12:15 PM

## 2023-07-20 NOTE — PROGRESS NOTES
PROGRESS NOTE    Subjective   Chief complaint: Ruth Wheatley is a 89 y.o. female who is a long term care patient being seen and evaluated for   Edema    HPI:   patient presents with extreme edema to the left lower extremity   Extending up to the thigh.  denies fall or trauma does endorse pain associated.  No other concerns at this time.  No acute distress.  denies chest pain or headache.      Objective   Vital signs:  126/72, 98%    Physical Exam  Constitutional:       General: She is not in acute distress.  Eyes:      Extraocular Movements: Extraocular movements intact.   Cardiovascular:      Rate and Rhythm: Normal rate and regular rhythm.   Pulmonary:      Effort: Pulmonary effort is normal.      Breath sounds: Normal breath sounds.   Abdominal:      General: Bowel sounds are normal.      Palpations: Abdomen is soft.   Musculoskeletal:      Cervical back: Neck supple.      Right lower leg: No edema.      Left lower leg: Edema present.      Comments: LLE Extreme edema extending up to the thigh   Neurological:      Mental Status: She is alert.   Psychiatric:         Mood and Affect: Mood normal.         Behavior: Behavior is cooperative.         Assessment/Plan   Problem List Items Addressed This Visit          Cardiac and Vasculature    Primary hypertension     Controlled  Continue to monitor            Symptoms and Signs    Edema of left lower extremity      obtain ultrasound  to rule out DVT          Medications, treatments, and labs reviewed  Continue medications and treatments as listed in [facility]    Scribe Attestation  By signing my name below, I, Leslye Yost   attest that this documentation has been prepared under the direction and in the presence of Corrie Desouza MD.    Provider Attestation - Scribe documentation  All medical record entries made by the Scribe were at my direction and personally dictated by me. I have reviewed the chart and agree that the record accurately reflects my personal  performance of the history, physical exam, discussion and plan.    1. Edema of left lower extremity        2. Primary hypertension

## 2023-07-21 ENCOUNTER — NURSING HOME VISIT (OUTPATIENT)
Dept: POST ACUTE CARE | Facility: EXTERNAL LOCATION | Age: 88
End: 2023-07-21
Payer: MEDICARE

## 2023-07-21 DIAGNOSIS — R60.0 EDEMA OF LEFT LOWER EXTREMITY: Primary | ICD-10-CM

## 2023-07-21 DIAGNOSIS — I10 PRIMARY HYPERTENSION: ICD-10-CM

## 2023-07-21 DIAGNOSIS — J43.9 PULMONARY EMPHYSEMA, UNSPECIFIED EMPHYSEMA TYPE (MULTI): ICD-10-CM

## 2023-07-21 PROCEDURE — 99308 SBSQ NF CARE LOW MDM 20: CPT | Performed by: INTERNAL MEDICINE

## 2023-07-21 NOTE — ASSESSMENT & PLAN NOTE
Improved   continue to elevate legs as tolerated  ultrasound  negative for DVT  Edema likely secondary to venous insufficiency

## 2023-07-21 NOTE — LETTER
Patient: Ruth Wheatley  : 10/30/1929    Encounter Date: 2023    PROGRESS NOTE    Subjective  Chief complaint: Ruth Wheatley is a 89 y.o. female who is a long term care patient being seen and evaluated for  EDEMA    HPI:  Patient presented with complaints of edema to the LLE. US was obtained and negative for DVT. Edema is improved and she denies pain or discomfort associated at this time.  denies chest pain or headache.   No acute distress.   Denies shortness of breath or orthopnea.      Objective  Vital signs:  132/76, 97%    Physical Exam  Musculoskeletal:      Right lower leg: Edema present.      Left lower leg: Edema present.         Assessment/Plan  Problem List Items Addressed This Visit          Cardiac and Vasculature    Primary hypertension     Controlled  Continue to monitor            Pulmonary and Pneumonias    Emphysema lung (CMS/HCC)     Stable  Continue Breo inhalers            Symptoms and Signs    Edema of left lower extremity - Primary       Improved   continue to elevate legs as tolerated  ultrasound  negative for DVT  Edema likely secondary to venous insufficiency            Medications, treatments, and labs reviewed  Continue medications and treatments as listed in [facility]    Scribe Attestation  By signing my name below, I, Tahmina Mazariegos, Scribe   attest that this documentation has been prepared under the direction and in the presence of Corrie Desouza MD.    Provider Attestation - Scribe documentation  All medical record entries made by the Scribe were at my direction and personally dictated by me. I have reviewed the chart and agree that the record accurately reflects my personal performance of the history, physical exam, discussion and plan.    1. Edema of left lower extremity        2. Primary hypertension        3. Pulmonary emphysema, unspecified emphysema type (CMS/HCC)               Electronically Signed By: Corrie Desouza MD   23  5:27 PM

## 2023-07-21 NOTE — PROGRESS NOTES
PROGRESS NOTE    Subjective   Chief complaint: Ruth Wheatley is a 89 y.o. female who is a long term care patient being seen and evaluated for  EDEMA    HPI:  Patient presented with complaints of edema to the LLE. US was obtained and negative for DVT. Edema is improved and she denies pain or discomfort associated at this time.  denies chest pain or headache.   No acute distress.   Denies shortness of breath or orthopnea.      Objective   Vital signs:  132/76, 97%    Physical Exam  Musculoskeletal:      Right lower leg: Edema present.      Left lower leg: Edema present.         Assessment/Plan   Problem List Items Addressed This Visit          Cardiac and Vasculature    Primary hypertension     Controlled  Continue to monitor            Pulmonary and Pneumonias    Emphysema lung (CMS/HCC)     Stable  Continue Breo inhalers            Symptoms and Signs    Edema of left lower extremity - Primary       Improved   continue to elevate legs as tolerated  ultrasound  negative for DVT  Edema likely secondary to venous insufficiency            Medications, treatments, and labs reviewed  Continue medications and treatments as listed in [facility]    Scribe Attestation  By signing my name below, ITahmina Scribdesean   attest that this documentation has been prepared under the direction and in the presence of Corrie Desouza MD.    Provider Attestation - Scribe documentation  All medical record entries made by the Scribe were at my direction and personally dictated by me. I have reviewed the chart and agree that the record accurately reflects my personal performance of the history, physical exam, discussion and plan.    1. Edema of left lower extremity        2. Primary hypertension        3. Pulmonary emphysema, unspecified emphysema type (CMS/HCC)

## 2023-07-26 ENCOUNTER — NURSING HOME VISIT (OUTPATIENT)
Dept: POST ACUTE CARE | Facility: EXTERNAL LOCATION | Age: 88
End: 2023-07-26
Payer: MEDICARE

## 2023-07-26 DIAGNOSIS — I10 PRIMARY HYPERTENSION: Primary | ICD-10-CM

## 2023-07-26 DIAGNOSIS — R53.1 WEAKNESS: ICD-10-CM

## 2023-07-26 PROCEDURE — 99308 SBSQ NF CARE LOW MDM 20: CPT | Performed by: INTERNAL MEDICINE

## 2023-07-26 NOTE — LETTER
Patient: Ruth Wheatley  : 10/30/1929    Encounter Date: 2023    PROGRESS NOTE    Subjective  Chief complaint: Ruth Wheatley is a 89 y.o. female who is a long term care patient being seen and evaluated for   Weakness    HPI:   continues to work in therapy.  She requires assistance with transfers ADLs and mobility.  No new issues or concerns.  No acute distress.   Denies chest pain or headache.      Objective  Vital signs:  126/74, 98%    Physical Exam  Constitutional:       General: She is not in acute distress.  Eyes:      Extraocular Movements: Extraocular movements intact.   Cardiovascular:      Rate and Rhythm: Normal rate and regular rhythm.   Pulmonary:      Effort: Pulmonary effort is normal.      Breath sounds: Normal breath sounds.   Abdominal:      General: Bowel sounds are normal.      Palpations: Abdomen is soft.   Musculoskeletal:      Cervical back: Neck supple.      Right lower leg: No edema.      Left lower leg: No edema.   Neurological:      Mental Status: She is alert.   Psychiatric:         Mood and Affect: Mood normal.         Behavior: Behavior is cooperative.         Assessment/Plan  Problem List Items Addressed This Visit          Cardiac and Vasculature    Primary hypertension - Primary     Controlled  Continue to monitor            Symptoms and Signs    Weakness      continue therapy          Medications, treatments, and labs reviewed  Continue medications and treatments as listed in [facility]    Scribe Attestation  By signing my name below, I, Leslye Yost   attest that this documentation has been prepared under the direction and in the presence of Corrie Desouza MD.    Provider Attestation - Scribe documentation  All medical record entries made by the Scribe were at my direction and personally dictated by me. I have reviewed the chart and agree that the record accurately reflects my personal performance of the history, physical exam, discussion and plan.    1. Primary  hypertension        2. Weakness               Electronically Signed By: Corrie Desouza MD   7/26/23  8:30 PM

## 2023-07-26 NOTE — PROGRESS NOTES
PROGRESS NOTE    Subjective   Chief complaint: Ruth Wheatley is a 89 y.o. female who is a long term care patient being seen and evaluated for   Weakness    HPI:   continues to work in therapy.  She requires assistance with transfers ADLs and mobility.  No new issues or concerns.  No acute distress.   Denies chest pain or headache.      Objective   Vital signs:  126/74, 98%    Physical Exam  Constitutional:       General: She is not in acute distress.  Eyes:      Extraocular Movements: Extraocular movements intact.   Cardiovascular:      Rate and Rhythm: Normal rate and regular rhythm.   Pulmonary:      Effort: Pulmonary effort is normal.      Breath sounds: Normal breath sounds.   Abdominal:      General: Bowel sounds are normal.      Palpations: Abdomen is soft.   Musculoskeletal:      Cervical back: Neck supple.      Right lower leg: No edema.      Left lower leg: No edema.   Neurological:      Mental Status: She is alert.   Psychiatric:         Mood and Affect: Mood normal.         Behavior: Behavior is cooperative.         Assessment/Plan   Problem List Items Addressed This Visit          Cardiac and Vasculature    Primary hypertension - Primary     Controlled  Continue to monitor            Symptoms and Signs    Weakness      continue therapy          Medications, treatments, and labs reviewed  Continue medications and treatments as listed in [facility]    Scribe Attestation  By signing my name below, ITahmina Scribe   attest that this documentation has been prepared under the direction and in the presence of Corrie Desouza MD.    Provider Attestation - Scribe documentation  All medical record entries made by the Scribe were at my direction and personally dictated by me. I have reviewed the chart and agree that the record accurately reflects my personal performance of the history, physical exam, discussion and plan.    1. Primary hypertension        2. Weakness

## 2023-07-28 ENCOUNTER — NURSING HOME VISIT (OUTPATIENT)
Dept: POST ACUTE CARE | Facility: EXTERNAL LOCATION | Age: 88
End: 2023-07-28
Payer: MEDICARE

## 2023-07-28 DIAGNOSIS — U07.1 COVID: ICD-10-CM

## 2023-07-28 DIAGNOSIS — I10 PRIMARY HYPERTENSION: Primary | ICD-10-CM

## 2023-07-28 PROCEDURE — 99308 SBSQ NF CARE LOW MDM 20: CPT | Performed by: INTERNAL MEDICINE

## 2023-07-28 NOTE — LETTER
Patient: Ruth Wheatley  : 10/30/1929    Encounter Date: 2023    PROGRESS NOTE    Subjective  Chief complaint: Ruth Wheatley is a 89 y.o. female who is a long term care patient being seen and evaluated for  COVID    HPI:   patient tested positive for COVID-19 and placed in isolation, antiviral started and labs pending.  She denies cough, congestion, fever, chills, body aches or GI issues.  Denies chest pain or headache.  No acute distress.      Objective  Vital signs:  129/72, 98%    Physical Exam  Constitutional:       General: She is not in acute distress.  Eyes:      Extraocular Movements: Extraocular movements intact.   Cardiovascular:      Rate and Rhythm: Normal rate and regular rhythm.   Pulmonary:      Effort: Pulmonary effort is normal.      Breath sounds: Normal breath sounds.   Abdominal:      General: Bowel sounds are normal.      Palpations: Abdomen is soft.   Musculoskeletal:      Cervical back: Neck supple.      Right lower leg: No edema.      Left lower leg: No edema.   Neurological:      Mental Status: She is alert.   Psychiatric:         Mood and Affect: Mood normal.         Behavior: Behavior is cooperative.         Assessment/Plan  Problem List Items Addressed This Visit       Primary hypertension - Primary     Controlled  Continue to monitor         COVID      continue isolation precautions  Antiviral  Labs pending  Monitor symptoms          Medications, treatments, and labs reviewed  Continue medications and treatments as listed in [facility]    Scribe Attestation  By signing my name below, I, Leslye Yost   attest that this documentation has been prepared under the direction and in the presence of Corrie Desouza MD.    Provider Attestation - Scribe documentation  All medical record entries made by the Scribe were at my direction and personally dictated by me. I have reviewed the chart and agree that the record accurately reflects my personal performance of the history, physical  exam, discussion and plan.    1. Primary hypertension        2. COVID               Electronically Signed By: Corrie Desouza MD   7/28/23  5:03 PM

## 2023-07-28 NOTE — PROGRESS NOTES
PROGRESS NOTE    Subjective   Chief complaint: Ruth Wheatley is a 89 y.o. female who is a long term care patient being seen and evaluated for  COVID    HPI:   patient tested positive for COVID-19 and placed in isolation, antiviral started and labs pending.  She denies cough, congestion, fever, chills, body aches or GI issues.  Denies chest pain or headache.  No acute distress.      Objective   Vital signs:  129/72, 98%    Physical Exam  Constitutional:       General: She is not in acute distress.  Eyes:      Extraocular Movements: Extraocular movements intact.   Cardiovascular:      Rate and Rhythm: Normal rate and regular rhythm.   Pulmonary:      Effort: Pulmonary effort is normal.      Breath sounds: Normal breath sounds.   Abdominal:      General: Bowel sounds are normal.      Palpations: Abdomen is soft.   Musculoskeletal:      Cervical back: Neck supple.      Right lower leg: No edema.      Left lower leg: No edema.   Neurological:      Mental Status: She is alert.   Psychiatric:         Mood and Affect: Mood normal.         Behavior: Behavior is cooperative.         Assessment/Plan   Problem List Items Addressed This Visit       Primary hypertension - Primary     Controlled  Continue to monitor         COVID      continue isolation precautions  Antiviral  Labs pending  Monitor symptoms          Medications, treatments, and labs reviewed  Continue medications and treatments as listed in [facility]    Scribe Attestation  By signing my name below, ITahmina Scribe   attest that this documentation has been prepared under the direction and in the presence of Corrie Desouza MD.    Provider Attestation - Scribe documentation  All medical record entries made by the Scribe were at my direction and personally dictated by me. I have reviewed the chart and agree that the record accurately reflects my personal performance of the history, physical exam, discussion and plan.    1. Primary hypertension        2. COVID

## 2023-07-31 ENCOUNTER — NURSING HOME VISIT (OUTPATIENT)
Dept: POST ACUTE CARE | Facility: EXTERNAL LOCATION | Age: 88
End: 2023-07-31
Payer: MEDICARE

## 2023-07-31 DIAGNOSIS — U07.1 COVID: Primary | ICD-10-CM

## 2023-07-31 DIAGNOSIS — R19.7 DIARRHEA, UNSPECIFIED TYPE: ICD-10-CM

## 2023-07-31 DIAGNOSIS — R05.9 COUGH, UNSPECIFIED TYPE: ICD-10-CM

## 2023-07-31 DIAGNOSIS — I10 PRIMARY HYPERTENSION: ICD-10-CM

## 2023-07-31 PROCEDURE — 99308 SBSQ NF CARE LOW MDM 20: CPT | Performed by: NURSE PRACTITIONER

## 2023-07-31 NOTE — LETTER
Patient: Ruth Wheatley  : 10/30/1929    Encounter Date: 2023    PROGRESS NOTE    Subjective  Chief complaint: Ruth Wheatley is a 89 y.o. female who is a long term care patient being seen and evaluated for Covid.    HPI:   Patient tested and found to be positive for COVID.  She has complaints of diarrhea and cough.  Denies shortness of breath, fever and chills.  Remains in isolation.      Objective  Vital signs:   16, 124/57, 97.4, 57, 95%  Physical Exam  Constitutional:       General: She is not in acute distress.  Eyes:      Extraocular Movements: Extraocular movements intact.   Cardiovascular:      Rate and Rhythm: Normal rate.   Pulmonary:      Effort: Pulmonary effort is normal.   Abdominal:      General: Bowel sounds are normal. There is no distension.      Palpations: Abdomen is soft.      Tenderness: There is no abdominal tenderness.   Musculoskeletal:      Cervical back: Neck supple.      Right lower leg: No edema.      Left lower leg: No edema.   Neurological:      Mental Status: She is alert.   Psychiatric:         Mood and Affect: Mood normal.         Behavior: Behavior is cooperative.         Assessment/Plan  Problem List Items Addressed This Visit       Cough    COVID - Primary    Diarrhea    Primary hypertension   Start mucinex and imodium  Isolation  Supportive tx  Medications, treatments, and labs reviewed  Continue medications and treatments as listed in EMR    Scribe Attestation  IJanie Scribe   attest that this documentation has been prepared under the direction and in the presence of ISAURO Lorenzana    Provider Attestation - Scribe documentation  All medical record entries made by the Scribe were at my direction and personally dictated by me. I have reviewed the chart and agree that the record accurately reflects my personal performance of the history, physical exam, discussion and plan.   ISAURO Lorenzana        Electronically Signed By: Gi TALAVERA  JANE Piña-CNP   8/9/23  3:34 PM

## 2023-08-01 ENCOUNTER — NURSING HOME VISIT (OUTPATIENT)
Dept: POST ACUTE CARE | Facility: EXTERNAL LOCATION | Age: 88
End: 2023-08-01
Payer: MEDICARE

## 2023-08-01 DIAGNOSIS — I10 PRIMARY HYPERTENSION: ICD-10-CM

## 2023-08-01 DIAGNOSIS — U07.1 COVID: Primary | ICD-10-CM

## 2023-08-01 DIAGNOSIS — R53.1 WEAKNESS: ICD-10-CM

## 2023-08-01 PROCEDURE — 99308 SBSQ NF CARE LOW MDM 20: CPT | Performed by: NURSE PRACTITIONER

## 2023-08-01 NOTE — LETTER
Patient: Ruth Wheatley  : 10/30/1929    Encounter Date: 2023    PROGRESS NOTE    Subjective  Chief complaint: Ruth Wheatley is a 89 y.o. female who is a long term care patient being seen and evaluated for follow-up of Covid.    HPI:   Patient being seen for follow-up of COVID.  Remains in isolation.  Started Mucinex and as needed Imodium on .  Patient has moist cough.  No longer having diarrhea.  States she is feeling a little better.  Denies sob, fever and chills.      Objective  Vital signs: 17, 126/62, 97.1, 65, 96%  Physical Exam  Constitutional:       General: She is not in acute distress.  Eyes:      Extraocular Movements: Extraocular movements intact.   Cardiovascular:      Rate and Rhythm: Normal rate.   Pulmonary:      Effort: Pulmonary effort is normal.   Abdominal:      General: Bowel sounds are normal. There is no distension.      Palpations: Abdomen is soft.      Tenderness: There is no abdominal tenderness.   Musculoskeletal:      Cervical back: Neck supple.      Right lower leg: No edema.      Left lower leg: No edema.   Neurological:      Mental Status: She is alert.   Psychiatric:         Mood and Affect: Mood normal.         Behavior: Behavior is cooperative.         Assessment/Plan  Problem List Items Addressed This Visit       COVID - Primary     Continue isolation   Antiviral  Supportive treatment          Primary hypertension     Controlled  Continue to monitor         Weakness     Medications, treatments, and labs reviewed  Continue medications and treatments as listed in EMR    Scribe Attestation  Janie GUSTAFSON Scribe   attest that this documentation has been prepared under the direction and in the presence of JANE Lorenzana-CNP    Provider Attestation - Scribe documentation  All medical record entries made by the Scribe were at my direction and personally dictated by me. I have reviewed the chart and agree that the record accurately reflects my personal  performance of the history, physical exam, discussion and plan.   ISAURO Lorenzana        Electronically Signed By: ISAURO Lorenzana   8/9/23  4:01 PM

## 2023-08-01 NOTE — PROGRESS NOTES
PROGRESS NOTE    Subjective   Chief complaint: Ruth Wheatley is a 89 y.o. female who is a long term care patient being seen and evaluated for Covid.    HPI:   Patient tested and found to be positive for COVID.  She has complaints of diarrhea and cough.  Denies shortness of breath, fever and chills.  Remains in isolation.      Objective   Vital signs:   16, 124/57, 97.4, 57, 95%  Physical Exam  Constitutional:       General: She is not in acute distress.  Eyes:      Extraocular Movements: Extraocular movements intact.   Cardiovascular:      Rate and Rhythm: Normal rate.   Pulmonary:      Effort: Pulmonary effort is normal.   Abdominal:      General: Bowel sounds are normal. There is no distension.      Palpations: Abdomen is soft.      Tenderness: There is no abdominal tenderness.   Musculoskeletal:      Cervical back: Neck supple.      Right lower leg: No edema.      Left lower leg: No edema.   Neurological:      Mental Status: She is alert.   Psychiatric:         Mood and Affect: Mood normal.         Behavior: Behavior is cooperative.         Assessment/Plan   Problem List Items Addressed This Visit       Cough    COVID - Primary    Diarrhea    Primary hypertension   Start mucinex and imodium  Isolation  Supportive tx  Medications, treatments, and labs reviewed  Continue medications and treatments as listed in EMR    Scribe Attestation  I, Leslye Sifuentes   attest that this documentation has been prepared under the direction and in the presence of ISAURO Lorenzana    Provider Attestation - Scribe documentation  All medical record entries made by the Scribe were at my direction and personally dictated by me. I have reviewed the chart and agree that the record accurately reflects my personal performance of the history, physical exam, discussion and plan.   ISAURO Lorenzana

## 2023-08-02 ENCOUNTER — NURSING HOME VISIT (OUTPATIENT)
Dept: POST ACUTE CARE | Facility: EXTERNAL LOCATION | Age: 88
End: 2023-08-02
Payer: MEDICARE

## 2023-08-02 DIAGNOSIS — F32.A DEPRESSION, UNSPECIFIED DEPRESSION TYPE: ICD-10-CM

## 2023-08-02 DIAGNOSIS — J43.9 PULMONARY EMPHYSEMA, UNSPECIFIED EMPHYSEMA TYPE (MULTI): ICD-10-CM

## 2023-08-02 DIAGNOSIS — U07.1 COVID: ICD-10-CM

## 2023-08-02 DIAGNOSIS — I10 PRIMARY HYPERTENSION: Primary | ICD-10-CM

## 2023-08-02 PROCEDURE — 99309 SBSQ NF CARE MODERATE MDM 30: CPT | Performed by: INTERNAL MEDICINE

## 2023-08-02 NOTE — LETTER
Patient: Ruth Wheatley  : 10/30/1929    Encounter Date: 2023    PROGRESS NOTE    Subjective  Chief complaint: Ruth Wheatley is a 89 y.o. female who is a long term care patient being seen and evaluated for monthly general medical care and follow-up, Covid    HPI:   patient presents for general medical care and f/u.  Patient seen and examined at bedside.  She  denies sob, wheezing, cough.  HTN,  Denies chest pain and headache.    Patient with diagnosis of depression.  Denies feeling down and thoughts of harming self or others.    Patient remains COVID-19.  Denies chills nausea vomiting GI issues or bodyaches. No acute distress.      Objective  Vital signs: 129/73, 96%    Physical Exam  Constitutional:       General: She is not in acute distress.  Eyes:      Extraocular Movements: Extraocular movements intact.   Pulmonary:      Effort: Pulmonary effort is normal.      Breath sounds: Normal breath sounds.   Abdominal:      General: Bowel sounds are normal.      Palpations: Abdomen is soft.   Musculoskeletal:      Cervical back: Neck supple.      Right lower leg: Edema present.      Left lower leg: Edema present.   Neurological:      Mental Status: She is alert.   Psychiatric:         Mood and Affect: Mood normal.         Behavior: Behavior is cooperative.         Assessment/Plan  Problem List Items Addressed This Visit       Emphysema lung (CMS/HCC)     Stable  Continue Breo inhalers         Depression     Mood stable  Continue antihypertensives         Primary hypertension - Primary     Controlled  Continue to monitor         COVID      continue isolation precautions  Antiviral  Labs pending  Monitor symptoms        Medications, treatments, and labs reviewed  Continue medications and treatments as listed in [facility]    Scribe Attestation  By signing my name below, ITahmina, Scribe   attest that this documentation has been prepared under the direction and in the presence of Corrie Desouza  MD.    Provider Attestation - Scribe documentation  All medical record entries made by the Scribe were at my direction and personally dictated by me. I have reviewed the chart and agree that the record accurately reflects my personal performance of the history, physical exam, discussion and plan.  1. Primary hypertension        2. Depression, unspecified depression type        3. Pulmonary emphysema, unspecified emphysema type (CMS/HCC)        4. COVID              Electronically Signed By: Corrie Desouza MD   8/3/23 10:39 AM

## 2023-08-02 NOTE — PROGRESS NOTES
PROGRESS NOTE    Subjective   Chief complaint: Ruth Wheatley is a 89 y.o. female who is a long term care patient being seen and evaluated for monthly general medical care and follow-up, Covid    HPI:   patient presents for general medical care and f/u.  Patient seen and examined at bedside.  She  denies sob, wheezing, cough.  HTN,  Denies chest pain and headache.    Patient with diagnosis of depression.  Denies feeling down and thoughts of harming self or others.    Patient remains COVID-19.  Denies chills nausea vomiting GI issues or bodyaches. No acute distress.      Objective   Vital signs: 129/73, 96%    Physical Exam  Constitutional:       General: She is not in acute distress.  Eyes:      Extraocular Movements: Extraocular movements intact.   Pulmonary:      Effort: Pulmonary effort is normal.      Breath sounds: Normal breath sounds.   Abdominal:      General: Bowel sounds are normal.      Palpations: Abdomen is soft.   Musculoskeletal:      Cervical back: Neck supple.      Right lower leg: Edema present.      Left lower leg: Edema present.   Neurological:      Mental Status: She is alert.   Psychiatric:         Mood and Affect: Mood normal.         Behavior: Behavior is cooperative.         Assessment/Plan   Problem List Items Addressed This Visit       Emphysema lung (CMS/HCC)     Stable  Continue Breo inhalers         Depression     Mood stable  Continue antihypertensives         Primary hypertension - Primary     Controlled  Continue to monitor         COVID      continue isolation precautions  Antiviral  Labs pending  Monitor symptoms        Medications, treatments, and labs reviewed  Continue medications and treatments as listed in [facility]    Scribe Attestation  By signing my name below, Tahmina GUSTAFSON, Brooklynnibdesean   attest that this documentation has been prepared under the direction and in the presence of Corrie Desouza MD.    Provider Attestation - Scribe documentation  All medical record entries made by  the Scribe were at my direction and personally dictated by me. I have reviewed the chart and agree that the record accurately reflects my personal performance of the history, physical exam, discussion and plan.  1. Primary hypertension        2. Depression, unspecified depression type        3. Pulmonary emphysema, unspecified emphysema type (CMS/HCC)        4. COVID

## 2023-08-02 NOTE — PROGRESS NOTES
PROGRESS NOTE    Subjective   Chief complaint: Ruth Wheatley is a 89 y.o. female who is a long term care patient being seen and evaluated for follow-up of Covid.    HPI:   Patient being seen for follow-up of COVID.  Remains in isolation.  Started Mucinex and as needed Imodium on 7/31.  Patient has moist cough.  No longer having diarrhea.  States she is feeling a little better.  Denies sob, fever and chills.      Objective   Vital signs: 17, 126/62, 97.1, 65, 96%  Physical Exam  Constitutional:       General: She is not in acute distress.  Eyes:      Extraocular Movements: Extraocular movements intact.   Cardiovascular:      Rate and Rhythm: Normal rate.   Pulmonary:      Effort: Pulmonary effort is normal.   Abdominal:      General: Bowel sounds are normal. There is no distension.      Palpations: Abdomen is soft.      Tenderness: There is no abdominal tenderness.   Musculoskeletal:      Cervical back: Neck supple.      Right lower leg: No edema.      Left lower leg: No edema.   Neurological:      Mental Status: She is alert.   Psychiatric:         Mood and Affect: Mood normal.         Behavior: Behavior is cooperative.         Assessment/Plan   Problem List Items Addressed This Visit       COVID - Primary     Continue isolation   Antiviral  Supportive treatment          Primary hypertension     Controlled  Continue to monitor         Weakness     Medications, treatments, and labs reviewed  Continue medications and treatments as listed in EMR    Scribe Attestation  Janie GUSTAFSON Scribe   attest that this documentation has been prepared under the direction and in the presence of JANE Lorenzana-CNP    Provider Attestation - Scribe documentation  All medical record entries made by the Scribe were at my direction and personally dictated by me. I have reviewed the chart and agree that the record accurately reflects my personal performance of the history, physical exam, discussion and plan.   Gi TALAVERA  Ayana, APRN-CNP

## 2023-08-03 ENCOUNTER — NURSING HOME VISIT (OUTPATIENT)
Dept: POST ACUTE CARE | Facility: EXTERNAL LOCATION | Age: 88
End: 2023-08-03
Payer: MEDICARE

## 2023-08-03 DIAGNOSIS — J43.9 PULMONARY EMPHYSEMA, UNSPECIFIED EMPHYSEMA TYPE (MULTI): ICD-10-CM

## 2023-08-03 DIAGNOSIS — I10 PRIMARY HYPERTENSION: ICD-10-CM

## 2023-08-03 DIAGNOSIS — U07.1 COVID: Primary | ICD-10-CM

## 2023-08-03 PROCEDURE — 99308 SBSQ NF CARE LOW MDM 20: CPT | Performed by: NURSE PRACTITIONER

## 2023-08-03 NOTE — LETTER
Patient: Ruth Wheatley  : 10/30/1929    Encounter Date: 2023    PROGRESS NOTE    Subjective  Chief complaint: Ruth Wheatley is a 89 y.o. female who is a long term care patient being seen and evaluated for follow-up of Covid.    HPI:   Patient being seen for follow-up of COVID.  Remains in isolation.  Started Mucinex and as needed Imodium on .  Patient is feeling pretty good but still has cough.  Denies n/v/f and sob.  No further concerns.      Objective  Vital signs:  18, 138/61, 97.1, 81, 96%  Physical Exam  Constitutional:       General: She is not in acute distress.  Eyes:      Extraocular Movements: Extraocular movements intact.   Cardiovascular:      Rate and Rhythm: Normal rate.   Pulmonary:      Effort: Pulmonary effort is normal.      Breath sounds: Normal breath sounds.   Abdominal:      General: There is no distension.      Tenderness: There is no abdominal tenderness.   Musculoskeletal:      Cervical back: Neck supple.   Neurological:      Mental Status: She is alert.   Psychiatric:         Mood and Affect: Mood normal.         Behavior: Behavior is cooperative.         Assessment/Plan  Problem List Items Addressed This Visit       COVID - Primary     Isolation   Antiviral  complete  Supportive treatment   Robitussin          Emphysema lung (CMS/HCC)     Stable  Continue Breo inhalers         Primary hypertension     Controlled  Continue to monitor          Medications, treatments, and labs reviewed  Continue medications and treatments as listed in EMR    Scribe Attestation  IJanie Scribe   attest that this documentation has been prepared under the direction and in the presence of JANE Lorenzana-CNP    Provider Attestation - Scribe documentation  All medical record entries made by the Scribe were at my direction and personally dictated by me. I have reviewed the chart and agree that the record accurately reflects my personal performance of the history, physical exam,  discussion and plan.   ISAURO Lorenzana        Electronically Signed By: ISAURO Lorenzana   8/13/23  5:52 PM

## 2023-08-04 ENCOUNTER — NURSING HOME VISIT (OUTPATIENT)
Dept: POST ACUTE CARE | Facility: EXTERNAL LOCATION | Age: 88
End: 2023-08-04
Payer: MEDICARE

## 2023-08-04 DIAGNOSIS — U07.1 COVID: Primary | ICD-10-CM

## 2023-08-04 DIAGNOSIS — I10 PRIMARY HYPERTENSION: ICD-10-CM

## 2023-08-04 PROCEDURE — 99308 SBSQ NF CARE LOW MDM 20: CPT | Performed by: INTERNAL MEDICINE

## 2023-08-04 NOTE — LETTER
Patient: Ruth Wheatley  : 10/30/1929    Encounter Date: 2023    PROGRESS NOTE    Subjective  Chief complaint: Ruth Wheatley is a 89 y.o. female who is a long term care patient being seen and evaluated for  COVID    HPI:   patient remains in isolation for Covid 19. Denies cough, congestion, fever, chills, body aches or GI issues.  Denies chest pain or headache.  No acute distress.      Objective  Vital signs:  130/72, 98%    Physical Exam  Constitutional:       General: She is not in acute distress.  Eyes:      Extraocular Movements: Extraocular movements intact.   Cardiovascular:      Rate and Rhythm: Normal rate and regular rhythm.   Pulmonary:      Effort: Pulmonary effort is normal.      Breath sounds: Normal breath sounds.   Abdominal:      General: Bowel sounds are normal.      Palpations: Abdomen is soft.   Musculoskeletal:      Cervical back: Neck supple.      Right lower leg: No edema.      Left lower leg: No edema.   Neurological:      Mental Status: She is alert.   Psychiatric:         Mood and Affect: Mood normal.         Behavior: Behavior is cooperative.         Assessment/Plan  Problem List Items Addressed This Visit       Primary hypertension     Controlled  Continue to monitor         COVID - Primary      continue isolation precautions  Antiviral  Labs pending  Monitor symptoms        Medications, treatments, and labs reviewed  Continue medications and treatments as listed in [facility]    Scribe Attestation  By signing my name below, I, Leslye Yost   attest that this documentation has been prepared under the direction and in the presence of Corrie Desouza MD.    Provider Attestation - Scribe documentation  All medical record entries made by the Scribe were at my direction and personally dictated by me. I have reviewed the chart and agree that the record accurately reflects my personal performance of the history, physical exam, discussion and plan.    1. COVID        2. Primary  hypertension               Electronically Signed By: Corrie Desozua MD   8/7/23  5:48 PM

## 2023-08-07 ENCOUNTER — NURSING HOME VISIT (OUTPATIENT)
Dept: POST ACUTE CARE | Facility: EXTERNAL LOCATION | Age: 88
End: 2023-08-07
Payer: MEDICARE

## 2023-08-07 DIAGNOSIS — U07.1 COVID: Primary | ICD-10-CM

## 2023-08-07 DIAGNOSIS — I10 PRIMARY HYPERTENSION: ICD-10-CM

## 2023-08-07 DIAGNOSIS — J43.9 PULMONARY EMPHYSEMA, UNSPECIFIED EMPHYSEMA TYPE (MULTI): ICD-10-CM

## 2023-08-07 PROCEDURE — 99308 SBSQ NF CARE LOW MDM 20: CPT | Performed by: NURSE PRACTITIONER

## 2023-08-07 NOTE — LETTER
Patient: Ruth Wheatley  : 10/30/1929    Encounter Date: 2023    PROGRESS NOTE    Subjective  Chief complaint: Ruth Wheatley is a 89 y.o. female who is a long term care patient being seen and evaluated for follow-up of Covid.    HPI:   Patient being seen for follow-up of COVID.  Remains on isolation precautions.  No acute distress.  Denies n/v/f/c and sob.  No new concerns reported by nursing.      Objective  Vital signs:  18, 128/76, 97.4, 92, 97%  Physical Exam  Constitutional:       General: She is not in acute distress.  Eyes:      Extraocular Movements: Extraocular movements intact.   Cardiovascular:      Rate and Rhythm: Normal rate.   Pulmonary:      Effort: Pulmonary effort is normal.      Breath sounds: Normal breath sounds.   Musculoskeletal:      Cervical back: Neck supple.      Right lower leg: No edema.      Left lower leg: No edema.   Neurological:      Mental Status: She is alert.   Psychiatric:         Mood and Affect: Mood normal.         Behavior: Behavior is cooperative.         Assessment/Plan  Problem List Items Addressed This Visit       COVID - Primary     Isolation   Antiviral complete  Supportive treatment          Emphysema lung (CMS/HCC)     Stable  Continue Breo inhaler         Primary hypertension     Controlled  Continue to monitor          Medications, treatments, and labs reviewed  Continue medications and treatments as listed in EMR    Scribe Attestation  Janie GUSTAFSON Scribe   attest that this documentation has been prepared under the direction and in the presence of ISAURO Lorenzana    Provider Attestation - Scribe documentation  All medical record entries made by the Scribe were at my direction and personally dictated by me. I have reviewed the chart and agree that the record accurately reflects my personal performance of the history, physical exam, discussion and plan.   ISAURO Lorenzana        Electronically Signed By: Gi Piña  APRN-CNP   8/16/23  4:00 PM

## 2023-08-07 NOTE — PROGRESS NOTES
PROGRESS NOTE    Subjective   Chief complaint: Ruth Wheatley is a 89 y.o. female who is a long term care patient being seen and evaluated for  COVID    HPI:   patient remains in isolation for Covid 19. Denies cough, congestion, fever, chills, body aches or GI issues.  Denies chest pain or headache.  No acute distress.      Objective   Vital signs:  130/72, 98%    Physical Exam  Constitutional:       General: She is not in acute distress.  Eyes:      Extraocular Movements: Extraocular movements intact.   Cardiovascular:      Rate and Rhythm: Normal rate and regular rhythm.   Pulmonary:      Effort: Pulmonary effort is normal.      Breath sounds: Normal breath sounds.   Abdominal:      General: Bowel sounds are normal.      Palpations: Abdomen is soft.   Musculoskeletal:      Cervical back: Neck supple.      Right lower leg: No edema.      Left lower leg: No edema.   Neurological:      Mental Status: She is alert.   Psychiatric:         Mood and Affect: Mood normal.         Behavior: Behavior is cooperative.         Assessment/Plan   Problem List Items Addressed This Visit       Primary hypertension     Controlled  Continue to monitor         COVID - Primary      continue isolation precautions  Antiviral  Labs pending  Monitor symptoms        Medications, treatments, and labs reviewed  Continue medications and treatments as listed in [facility]    Scribe Attestation  By signing my name below, ITahmina Scribe   attest that this documentation has been prepared under the direction and in the presence of Corrie Desouza MD.    Provider Attestation - Scribe documentation  All medical record entries made by the Scribe were at my direction and personally dictated by me. I have reviewed the chart and agree that the record accurately reflects my personal performance of the history, physical exam, discussion and plan.    1. COVID        2. Primary hypertension

## 2023-08-09 ENCOUNTER — NURSING HOME VISIT (OUTPATIENT)
Dept: POST ACUTE CARE | Facility: EXTERNAL LOCATION | Age: 88
End: 2023-08-09
Payer: MEDICARE

## 2023-08-09 DIAGNOSIS — R05.9 COUGH, UNSPECIFIED TYPE: ICD-10-CM

## 2023-08-09 DIAGNOSIS — U07.1 COVID: ICD-10-CM

## 2023-08-09 DIAGNOSIS — I10 PRIMARY HYPERTENSION: ICD-10-CM

## 2023-08-09 DIAGNOSIS — R19.7 DIARRHEA, UNSPECIFIED TYPE: Primary | ICD-10-CM

## 2023-08-09 PROBLEM — M25.551 RIGHT HIP PAIN: Status: RESOLVED | Noted: 2023-04-06 | Resolved: 2023-08-09

## 2023-08-09 PROCEDURE — 99308 SBSQ NF CARE LOW MDM 20: CPT | Performed by: INTERNAL MEDICINE

## 2023-08-09 NOTE — PROGRESS NOTES
PROGRESS NOTE    Subjective   Chief complaint: Ruth Wheatley is a 89 y.o. female who is a long term care patient being seen and evaluated for  COVID-19    HPI:   patient tested positive for COVID-19.  She denies fever, chills, shortness of breath or bodyaches.  She does endorse a cough and diarrhea.  Denies nausea or vomiting.  No acute distress.  denies chest pain or headache      Objective   Vital signs:  133/72, 98%    Physical Exam  Constitutional:       General: She is not in acute distress.  Eyes:      Extraocular Movements: Extraocular movements intact.   Cardiovascular:      Rate and Rhythm: Normal rate and regular rhythm.   Pulmonary:      Effort: Pulmonary effort is normal.      Breath sounds: Normal breath sounds.   Abdominal:      General: Bowel sounds are normal.      Palpations: Abdomen is soft.   Musculoskeletal:      Cervical back: Neck supple.      Right lower leg: No edema.      Left lower leg: No edema.   Neurological:      Mental Status: She is alert.   Psychiatric:         Mood and Affect: Mood normal.         Behavior: Behavior is cooperative.         Assessment/Plan   Problem List Items Addressed This Visit       Primary hypertension     Controlled  Continue to monitor         Diarrhea - Primary       Imodium as needed         COVID      continue isolation precautions  Antiviral   CBC, BMP, D-dimer, CRP pending   supportive treatment as needed         Cough      continue Mucinex          Medications, treatments, and labs reviewed  Continue medications and treatments as listed in [facility]    Scribe Attestation  By signing my name below, I, Leslye Yost   attest that this documentation has been prepared under the direction and in the presence of Corrie Desouza MD.    Provider Attestation - Scribe documentation  All medical record entries made by the Scribe were at my direction and personally dictated by me. I have reviewed the chart and agree that the record accurately reflects my  personal performance of the history, physical exam, discussion and plan.    1. Diarrhea, unspecified type        2. Cough, unspecified type        3. COVID        4. Primary hypertension

## 2023-08-09 NOTE — ASSESSMENT & PLAN NOTE
continue isolation precautions  Antiviral   CBC, BMP, D-dimer, CRP pending   supportive treatment as needed

## 2023-08-09 NOTE — LETTER
Patient: Ruth Wheatley  : 10/30/1929    Encounter Date: 2023    PROGRESS NOTE    Subjective  Chief complaint: Ruth Wheatley is a 89 y.o. female who is a long term care patient being seen and evaluated for  COVID-19    HPI:   patient tested positive for COVID-19.  She denies fever, chills, shortness of breath or bodyaches.  She does endorse a cough and diarrhea.  Denies nausea or vomiting.  No acute distress.  denies chest pain or headache      Objective  Vital signs:  133/72, 98%    Physical Exam  Constitutional:       General: She is not in acute distress.  Eyes:      Extraocular Movements: Extraocular movements intact.   Cardiovascular:      Rate and Rhythm: Normal rate and regular rhythm.   Pulmonary:      Effort: Pulmonary effort is normal.      Breath sounds: Normal breath sounds.   Abdominal:      General: Bowel sounds are normal.      Palpations: Abdomen is soft.   Musculoskeletal:      Cervical back: Neck supple.      Right lower leg: No edema.      Left lower leg: No edema.   Neurological:      Mental Status: She is alert.   Psychiatric:         Mood and Affect: Mood normal.         Behavior: Behavior is cooperative.         Assessment/Plan  Problem List Items Addressed This Visit       Primary hypertension     Controlled  Continue to monitor         Diarrhea - Primary       Imodium as needed         COVID      continue isolation precautions  Antiviral   CBC, BMP, D-dimer, CRP pending   supportive treatment as needed         Cough      continue Mucinex          Medications, treatments, and labs reviewed  Continue medications and treatments as listed in [facility]    Scribe Attestation  By signing my name below, Tahmina GUSTAFSON Scribe   attest that this documentation has been prepared under the direction and in the presence of Corrie Desouza MD.    Provider Attestation - Scribe documentation  All medical record entries made by the Scribe were at my direction and personally dictated by me. I have  reviewed the chart and agree that the record accurately reflects my personal performance of the history, physical exam, discussion and plan.    1. Diarrhea, unspecified type        2. Cough, unspecified type        3. COVID        4. Primary hypertension               Electronically Signed By: Corrie Desouza MD   8/9/23  2:14 PM

## 2023-08-10 ENCOUNTER — NURSING HOME VISIT (OUTPATIENT)
Dept: POST ACUTE CARE | Facility: EXTERNAL LOCATION | Age: 88
End: 2023-08-10
Payer: MEDICARE

## 2023-08-10 DIAGNOSIS — I10 PRIMARY HYPERTENSION: ICD-10-CM

## 2023-08-10 DIAGNOSIS — M79.89 RIGHT LEG SWELLING: ICD-10-CM

## 2023-08-10 DIAGNOSIS — R09.02 HYPOXIA: ICD-10-CM

## 2023-08-10 DIAGNOSIS — U07.1 COVID: Primary | ICD-10-CM

## 2023-08-10 DIAGNOSIS — J43.9 PULMONARY EMPHYSEMA, UNSPECIFIED EMPHYSEMA TYPE (MULTI): ICD-10-CM

## 2023-08-10 PROCEDURE — 99309 SBSQ NF CARE MODERATE MDM 30: CPT | Performed by: NURSE PRACTITIONER

## 2023-08-10 NOTE — PROGRESS NOTES
PROGRESS NOTE    Subjective   Chief complaint: Ruth Wheatley is a 89 y.o. female who is a long term care patient being seen and evaluated for follow-up of Covid.    HPI:   Patient being seen for follow-up of COVID.  Remains in isolation.  Started Mucinex and as needed Imodium on 7/31.  Patient is feeling pretty good but still has cough.  Denies n/v/f and sob.  No further concerns.      Objective   Vital signs:  18, 138/61, 97.1, 81, 96%  Physical Exam  Constitutional:       General: She is not in acute distress.  Eyes:      Extraocular Movements: Extraocular movements intact.   Cardiovascular:      Rate and Rhythm: Normal rate.   Pulmonary:      Effort: Pulmonary effort is normal.      Breath sounds: Normal breath sounds.   Abdominal:      General: There is no distension.      Tenderness: There is no abdominal tenderness.   Musculoskeletal:      Cervical back: Neck supple.   Neurological:      Mental Status: She is alert.   Psychiatric:         Mood and Affect: Mood normal.         Behavior: Behavior is cooperative.         Assessment/Plan   Problem List Items Addressed This Visit       COVID - Primary     Isolation   Antiviral  complete  Supportive treatment   Robitussin          Emphysema lung (CMS/HCC)     Stable  Continue Breo inhalers         Primary hypertension     Controlled  Continue to monitor          Medications, treatments, and labs reviewed  Continue medications and treatments as listed in EMR    Scribe Attestation  Janie GUSTAFSON Scribe   attest that this documentation has been prepared under the direction and in the presence of ISAURO Lorenzana    Provider Attestation - Scribe documentation  All medical record entries made by the Scribe were at my direction and personally dictated by me. I have reviewed the chart and agree that the record accurately reflects my personal performance of the history, physical exam, discussion and plan.   ISAURO Lorenzana

## 2023-08-10 NOTE — LETTER
Patient: Ruth Wheatley  : 10/30/1929    Encounter Date: 08/10/2023    PROGRESS NOTE    Subjective  Chief complaint: Ruth Wheatley is a 89 y.o. female who is a long term care patient being seen and evaluated for follow-up of Covid.    HPI:  Patient being seen for follow-up of COVID.  Started Mucinex and as needed Imodium .  Nurse called on  to report patient's pulse ox 84%.  Order was given to apply O2 and obtain CXR and labs which are pending.  Patient states she feels a little short of breath.  Started on aerosol treatments.  Patient denies fever and chills.      Objective  Vital signs: 131/60  Physical Exam  Constitutional:       General: She is not in acute distress.  Eyes:      Extraocular Movements: Extraocular movements intact.   Cardiovascular:      Rate and Rhythm: Normal rate.   Pulmonary:      Effort: Pulmonary effort is normal.      Breath sounds: Normal breath sounds.   Musculoskeletal:      Cervical back: Neck supple.      Right lower leg: Edema present.      Left lower leg: Edema present.      Comments: R>L     Neurological:      Mental Status: She is alert.   Psychiatric:         Mood and Affect: Mood normal.         Behavior: Behavior is cooperative.         Assessment/Plan  Problem List Items Addressed This Visit       COVID - Primary     Isolation   Antiviral complete  Supportive treatment   Start dexamethasone d/t hypoxia         Emphysema lung (CMS/HCC)     Continue Breo inhaler         Hypoxia     Dexamethasone   O2  Cxr and labs pending         Primary hypertension     Controlled  Continue to monitor         Right leg swelling     Obtain US          Medications, treatments, and labs reviewed  Continue medications and treatments as listed in EMR    Scribe Attestation  I, Leslye Sifuentes   attest that this documentation has been prepared under the direction and in the presence of JANE Lorenzana-CNP    Provider Attestation - Scribe documentation  All medical record  entries made by the Scribe were at my direction and personally dictated by me. I have reviewed the chart and agree that the record accurately reflects my personal performance of the history, physical exam, discussion and plan.   ISAURO Lorenzana        Electronically Signed By: ISAURO Lorenzana   8/24/23  4:55 PM

## 2023-08-11 ENCOUNTER — NURSING HOME VISIT (OUTPATIENT)
Dept: POST ACUTE CARE | Facility: EXTERNAL LOCATION | Age: 88
End: 2023-08-11
Payer: MEDICARE

## 2023-08-11 DIAGNOSIS — U07.1 COVID: Primary | ICD-10-CM

## 2023-08-11 DIAGNOSIS — I10 PRIMARY HYPERTENSION: ICD-10-CM

## 2023-08-11 PROCEDURE — 99308 SBSQ NF CARE LOW MDM 20: CPT | Performed by: INTERNAL MEDICINE

## 2023-08-11 NOTE — PROGRESS NOTES
PROGRESS NOTE    Subjective   Chief complaint: Ruth Wheatley is a 89 y.o. female who is a long term care patient being seen and evaluated for  COVID-19    HPI:   patient  removed from isolation for COVID-19.  She denies fever, chills, shortness of breath or body aches or GI issues, No acute distress.  denies chest pain or headache.      Objective   Vital signs:  133/72, 98%    Physical Exam  Constitutional:       General: She is not in acute distress.  Eyes:      Extraocular Movements: Extraocular movements intact.   Cardiovascular:      Rate and Rhythm: Normal rate and regular rhythm.   Pulmonary:      Effort: Pulmonary effort is normal.      Breath sounds: Normal breath sounds.   Abdominal:      General: Bowel sounds are normal.      Palpations: Abdomen is soft.   Musculoskeletal:      Cervical back: Neck supple.      Right lower leg: No edema.      Left lower leg: No edema.   Neurological:      Mental Status: She is alert.   Psychiatric:         Mood and Affect: Mood normal.         Behavior: Behavior is cooperative.         Assessment/Plan   Problem List Items Addressed This Visit       Primary hypertension     Controlled  Continue to monitor         COVID - Primary      removed from isolation   Antiviral  complete  Supportive treatment         Medications, treatments, and labs reviewed  Continue medications and treatments as listed in [facility]    Scribe Attestation  By signing my name below, ITahmina Scribe   attest that this documentation has been prepared under the direction and in the presence of Corrie Desouza MD.    Provider Attestation - Scribe documentation  All medical record entries made by the Scribe were at my direction and personally dictated by me. I have reviewed the chart and agree that the record accurately reflects my personal performance of the history, physical exam, discussion and plan.    1. COVID        2. Primary hypertension

## 2023-08-11 NOTE — LETTER
Patient: Ruth Wheatley  : 10/30/1929    Encounter Date: 2023    PROGRESS NOTE    Subjective  Chief complaint: Ruth Wheatley is a 89 y.o. female who is a long term care patient being seen and evaluated for  COVID-19    HPI:   patient  removed from isolation for COVID-19.  She denies fever, chills, shortness of breath or body aches or GI issues, No acute distress.  denies chest pain or headache.      Objective  Vital signs:  133/72, 98%    Physical Exam  Constitutional:       General: She is not in acute distress.  Eyes:      Extraocular Movements: Extraocular movements intact.   Cardiovascular:      Rate and Rhythm: Normal rate and regular rhythm.   Pulmonary:      Effort: Pulmonary effort is normal.      Breath sounds: Normal breath sounds.   Abdominal:      General: Bowel sounds are normal.      Palpations: Abdomen is soft.   Musculoskeletal:      Cervical back: Neck supple.      Right lower leg: No edema.      Left lower leg: No edema.   Neurological:      Mental Status: She is alert.   Psychiatric:         Mood and Affect: Mood normal.         Behavior: Behavior is cooperative.         Assessment/Plan  Problem List Items Addressed This Visit       Primary hypertension     Controlled  Continue to monitor         COVID - Primary      removed from isolation   Antiviral  complete  Supportive treatment         Medications, treatments, and labs reviewed  Continue medications and treatments as listed in [facility]    Scribe Attestation  By signing my name below, I, Leslye Yost   attest that this documentation has been prepared under the direction and in the presence of Corrie Desouza MD.    Provider Attestation - Scribe documentation  All medical record entries made by the Scribe were at my direction and personally dictated by me. I have reviewed the chart and agree that the record accurately reflects my personal performance of the history, physical exam, discussion and plan.    1. COVID        2.  Primary hypertension               Electronically Signed By: Corrie Desouza MD   8/11/23  3:09 PM

## 2023-08-11 NOTE — PROGRESS NOTES
PROGRESS NOTE    Subjective   Chief complaint: Ruth Wheatley is a 89 y.o. female who is a long term care patient being seen and evaluated for follow-up of Covid.    HPI:   Patient being seen for follow-up of COVID.  Remains on isolation precautions.  No acute distress.  Denies n/v/f/c and sob.  No new concerns reported by nursing.      Objective   Vital signs:  18, 128/76, 97.4, 92, 97%  Physical Exam  Constitutional:       General: She is not in acute distress.  Eyes:      Extraocular Movements: Extraocular movements intact.   Cardiovascular:      Rate and Rhythm: Normal rate.   Pulmonary:      Effort: Pulmonary effort is normal.      Breath sounds: Normal breath sounds.   Musculoskeletal:      Cervical back: Neck supple.      Right lower leg: No edema.      Left lower leg: No edema.   Neurological:      Mental Status: She is alert.   Psychiatric:         Mood and Affect: Mood normal.         Behavior: Behavior is cooperative.         Assessment/Plan   Problem List Items Addressed This Visit       COVID - Primary     Isolation   Antiviral complete  Supportive treatment          Emphysema lung (CMS/HCC)     Stable  Continue Breo inhaler         Primary hypertension     Controlled  Continue to monitor          Medications, treatments, and labs reviewed  Continue medications and treatments as listed in EMR    Scribe Attestation  IJanie Scribe   attest that this documentation has been prepared under the direction and in the presence of ISAURO Lorenzana    Provider Attestation - Scribe documentation  All medical record entries made by the Scribe were at my direction and personally dictated by me. I have reviewed the chart and agree that the record accurately reflects my personal performance of the history, physical exam, discussion and plan.   ISAURO Lorenzana

## 2023-08-16 ENCOUNTER — NURSING HOME VISIT (OUTPATIENT)
Dept: POST ACUTE CARE | Facility: EXTERNAL LOCATION | Age: 88
End: 2023-08-16
Payer: MEDICARE

## 2023-08-16 DIAGNOSIS — I10 PRIMARY HYPERTENSION: Primary | ICD-10-CM

## 2023-08-16 PROBLEM — R19.7 DIARRHEA: Status: RESOLVED | Noted: 2023-06-08 | Resolved: 2023-08-16

## 2023-08-16 PROBLEM — R60.0 EDEMA OF LEFT LOWER EXTREMITY: Status: RESOLVED | Noted: 2023-04-06 | Resolved: 2023-08-16

## 2023-08-16 PROBLEM — R05.9 COUGH: Status: RESOLVED | Noted: 2023-08-09 | Resolved: 2023-08-16

## 2023-08-16 PROCEDURE — 99308 SBSQ NF CARE LOW MDM 20: CPT | Performed by: INTERNAL MEDICINE

## 2023-08-16 NOTE — LETTER
Patient: Ruth Wheatley  : 10/30/1929    Encounter Date: 2023    PROGRESS NOTE    Subjective  Chief complaint: Ruth Wheatley is a 89 y.o. female who is a long term care patient being seen and evaluated for PNA    HPI:    Patient  continues on ATB for PNA. No adverse effects noted. Denies constitutional       Objective  Vital signs: 134/76, 97%    Physical Exam  Constitutional:       General: She is not in acute distress.  Eyes:      Extraocular Movements: Extraocular movements intact.   Cardiovascular:      Rate and Rhythm: Normal rate and regular rhythm.   Pulmonary:      Effort: Pulmonary effort is normal.      Breath sounds: Normal breath sounds.   Abdominal:      General: Bowel sounds are normal.      Palpations: Abdomen is soft.   Musculoskeletal:      Cervical back: Neck supple.      Right lower leg: No edema.      Left lower leg: No edema.   Neurological:      Mental Status: She is alert.   Psychiatric:         Mood and Affect: Mood normal.         Behavior: Behavior is cooperative.         Assessment/Plan  Problem List Items Addressed This Visit       Primary hypertension - Primary     Controlled  Continue to monitor          Medications, treatments, and labs reviewed  Continue medications and treatments as listed in [facility]    Scribe Attestation  By signing my name below, ITahmina Scribe   attest that this documentation has been prepared under the direction and in the presence of Corrie Desouza MD.    Provider Attestation - Scribe documentation  All medical record entries made by the Scribe were at my direction and personally dictated by me. I have reviewed the chart and agree that the record accurately reflects my personal performance of the history, physical exam, discussion and plan.    1. Primary hypertension               Electronically Signed By: Corrie Desouza MD   23 11:13 AM

## 2023-08-17 NOTE — PROGRESS NOTES
PROGRESS NOTE    Subjective   Chief complaint: Ruth Wheatley is a 89 y.o. female who is a long term care patient being seen and evaluated for PNA    HPI:    Patient  continues on ATB for PNA. No adverse effects noted. Denies constitutional       Objective   Vital signs: 134/76, 97%    Physical Exam  Constitutional:       General: She is not in acute distress.  Eyes:      Extraocular Movements: Extraocular movements intact.   Cardiovascular:      Rate and Rhythm: Normal rate and regular rhythm.   Pulmonary:      Effort: Pulmonary effort is normal.      Breath sounds: Normal breath sounds.   Abdominal:      General: Bowel sounds are normal.      Palpations: Abdomen is soft.   Musculoskeletal:      Cervical back: Neck supple.      Right lower leg: No edema.      Left lower leg: No edema.   Neurological:      Mental Status: She is alert.   Psychiatric:         Mood and Affect: Mood normal.         Behavior: Behavior is cooperative.         Assessment/Plan   Problem List Items Addressed This Visit       Primary hypertension - Primary     Controlled  Continue to monitor          Medications, treatments, and labs reviewed  Continue medications and treatments as listed in [facility]    Scribe Attestation  By signing my name below, ITahmina Scribe   attest that this documentation has been prepared under the direction and in the presence of Corrie Desouaz MD.    Provider Attestation - Scribe documentation  All medical record entries made by the Scribe were at my direction and personally dictated by me. I have reviewed the chart and agree that the record accurately reflects my personal performance of the history, physical exam, discussion and plan.    1. Primary hypertension

## 2023-08-18 ENCOUNTER — NURSING HOME VISIT (OUTPATIENT)
Dept: POST ACUTE CARE | Facility: EXTERNAL LOCATION | Age: 88
End: 2023-08-18
Payer: MEDICARE

## 2023-08-18 DIAGNOSIS — M79.604 PAIN IN BOTH LOWER EXTREMITIES: ICD-10-CM

## 2023-08-18 DIAGNOSIS — M79.605 PAIN IN BOTH LOWER EXTREMITIES: ICD-10-CM

## 2023-08-18 DIAGNOSIS — J18.9 PNEUMONIA DUE TO INFECTIOUS ORGANISM, UNSPECIFIED LATERALITY, UNSPECIFIED PART OF LUNG: ICD-10-CM

## 2023-08-18 DIAGNOSIS — R09.02 HYPOXIA: Primary | ICD-10-CM

## 2023-08-18 PROCEDURE — 99308 SBSQ NF CARE LOW MDM 20: CPT | Performed by: INTERNAL MEDICINE

## 2023-08-22 PROBLEM — R09.02 HYPOXIA: Status: ACTIVE | Noted: 2023-08-22

## 2023-08-22 PROBLEM — J18.9 PNA (PNEUMONIA): Status: ACTIVE | Noted: 2023-08-22

## 2023-08-22 PROBLEM — M79.606 LEG PAIN: Status: ACTIVE | Noted: 2023-08-22

## 2023-08-22 NOTE — PROGRESS NOTES
PROGRESS NOTE    Subjective   Chief complaint: Ruth Wheatley is a 89 y.o. female who is a long term care patient being seen and evaluated for SOB    HPI:  Patient recently recovered from Covid, she continues on a course of ATB for PNA. Denies cough, fever or chills. She presented with complaints of SOB and leg pain. She was started on Dexamethasone and US was ordered for BLE. SOB improved and US results pending. No other concerns at this time. No acute distress.       Objective   Vital signs: 126/67, 96%    Physical Exam  Constitutional:       General: She is not in acute distress.  Eyes:      Extraocular Movements: Extraocular movements intact.   Cardiovascular:      Rate and Rhythm: Normal rate and regular rhythm.   Pulmonary:      Effort: Pulmonary effort is normal.      Breath sounds: Normal breath sounds.   Abdominal:      General: Bowel sounds are normal.      Palpations: Abdomen is soft.   Musculoskeletal:      Cervical back: Neck supple.      Right lower leg: No edema.      Left lower leg: No edema.   Neurological:      Mental Status: She is alert.   Psychiatric:         Mood and Affect: Mood normal.         Behavior: Behavior is cooperative.         Assessment/Plan   Problem List Items Addressed This Visit       Hypoxia - Primary     Improved  Continue dexamethasone         PNA (pneumonia)     Continue ATB until complete         Leg pain     US BLE - results pending          Medications, treatments, and labs reviewed  Continue medications and treatments as listed in [facility]    Scribe Attestation  By signing my name below, I, Leslye Yost   attest that this documentation has been prepared under the direction and in the presence of Corrie Desouza MD.    Provider Attestation - Scribe documentation  All medical record entries made by the Scribe were at my direction and personally dictated by me. I have reviewed the chart and agree that the record accurately reflects my personal performance of the  history, physical exam, discussion and plan.    1. Hypoxia        2. Pneumonia due to infectious organism, unspecified laterality, unspecified part of lung        3. Pain in both lower extremities

## 2023-08-22 NOTE — PROGRESS NOTES
PROGRESS NOTE    Subjective   Chief complaint: Ruth Wheatley is a 89 y.o. female who is a long term care patient being seen and evaluated for follow-up of Covid.    HPI:  Patient being seen for follow-up of COVID.  Started Mucinex and as needed Imodium 7/31.  Nurse called on 8/9 to report patient's pulse ox 84%.  Order was given to apply O2 and obtain CXR and labs which are pending.  Patient states she feels a little short of breath.  Started on aerosol treatments.  Patient denies fever and chills.      Objective   Vital signs: 131/60  Physical Exam  Constitutional:       General: She is not in acute distress.  Eyes:      Extraocular Movements: Extraocular movements intact.   Cardiovascular:      Rate and Rhythm: Normal rate.   Pulmonary:      Effort: Pulmonary effort is normal.      Breath sounds: Normal breath sounds.   Musculoskeletal:      Cervical back: Neck supple.      Right lower leg: Edema present.      Left lower leg: Edema present.      Comments: R>L     Neurological:      Mental Status: She is alert.   Psychiatric:         Mood and Affect: Mood normal.         Behavior: Behavior is cooperative.         Assessment/Plan   Problem List Items Addressed This Visit       COVID - Primary     Isolation   Antiviral complete  Supportive treatment   Start dexamethasone d/t hypoxia         Emphysema lung (CMS/HCC)     Continue Breo inhaler         Hypoxia     Dexamethasone   O2  Cxr and labs pending         Primary hypertension     Controlled  Continue to monitor         Right leg swelling     Obtain US          Medications, treatments, and labs reviewed  Continue medications and treatments as listed in EMR    Scribe Attestation  I, Leslye iSfuentes   attest that this documentation has been prepared under the direction and in the presence of JANE Lorenzana-CNP    Provider Attestation - Scribe documentation  All medical record entries made by the Scribe were at my direction and personally dictated by  me. I have reviewed the chart and agree that the record accurately reflects my personal performance of the history, physical exam, discussion and plan.   iG Piña, APRN-CNP

## 2023-08-23 ENCOUNTER — NURSING HOME VISIT (OUTPATIENT)
Dept: POST ACUTE CARE | Facility: EXTERNAL LOCATION | Age: 88
End: 2023-08-23
Payer: MEDICARE

## 2023-08-23 DIAGNOSIS — I10 PRIMARY HYPERTENSION: ICD-10-CM

## 2023-08-23 DIAGNOSIS — R53.1 WEAKNESS: Primary | ICD-10-CM

## 2023-08-23 PROCEDURE — 99308 SBSQ NF CARE LOW MDM 20: CPT | Performed by: INTERNAL MEDICINE

## 2023-08-24 PROBLEM — M79.89 RIGHT LEG SWELLING: Status: ACTIVE | Noted: 2023-08-24

## 2023-08-29 NOTE — PROGRESS NOTES
PROGRESS NOTE    Subjective   Chief complaint: Ruth Wheatley is a 89 y.o. female who is a long term care patient being seen and evaluated for weakness    HPI:  Patient continues to work in therapy due to weakness and debility. Requires assistance for transfers, ADL's and mobility. No new issues at this time. No acute distress. Denies chest pain or headache.       Objective   Vital signs: 129/76, 98%    Physical Exam  Constitutional:       General: She is not in acute distress.  Eyes:      Extraocular Movements: Extraocular movements intact.   Cardiovascular:      Rate and Rhythm: Normal rate and regular rhythm.   Pulmonary:      Effort: Pulmonary effort is normal.      Breath sounds: Normal breath sounds.   Abdominal:      General: Bowel sounds are normal.      Palpations: Abdomen is soft.   Musculoskeletal:      Cervical back: Neck supple.      Right lower leg: No edema.      Left lower leg: No edema.   Neurological:      Mental Status: She is alert.   Psychiatric:         Mood and Affect: Mood normal.         Behavior: Behavior is cooperative.         Assessment/Plan   Problem List Items Addressed This Visit       Primary hypertension     Controlled  Continue to monitor         Weakness - Primary      continue therapy          Medications, treatments, and labs reviewed  Continue medications and treatments as listed in [facility]    Scribe Attestation  By signing my name below, I, Leslye Yost   attest that this documentation has been prepared under the direction and in the presence of Corrie Desouza MD.    Provider Attestation - Scribe documentation  All medical record entries made by the Scribe were at my direction and personally dictated by me. I have reviewed the chart and agree that the record accurately reflects my personal performance of the history, physical exam, discussion and plan.    1. Weakness        2. Primary hypertension

## 2023-08-30 ENCOUNTER — NURSING HOME VISIT (OUTPATIENT)
Dept: POST ACUTE CARE | Facility: EXTERNAL LOCATION | Age: 88
End: 2023-08-30
Payer: MEDICARE

## 2023-08-30 DIAGNOSIS — I10 PRIMARY HYPERTENSION: ICD-10-CM

## 2023-08-30 DIAGNOSIS — J43.9 PULMONARY EMPHYSEMA, UNSPECIFIED EMPHYSEMA TYPE (MULTI): ICD-10-CM

## 2023-08-30 DIAGNOSIS — R53.1 WEAKNESS: Primary | ICD-10-CM

## 2023-08-30 PROCEDURE — 99308 SBSQ NF CARE LOW MDM 20: CPT | Performed by: INTERNAL MEDICINE

## 2023-08-30 NOTE — LETTER
Patient: Ruth Wheatley  : 10/30/1929    Encounter Date: 2023    PROGRESS NOTE    Subjective  Chief complaint: Ruth Wheatley is a 89 y.o. female who is a long term care patient being seen and evaluated for weakness    HPI:  Patient is working in therapy due to weakness and debility. Requires assistance for transfers, ADL's and mobility. No new issues at this time. Denies chest pain or headache. Denies SOB or orthopnea. No acute distress.       Objective  Vital signs: 124/76, 98%    Physical Exam  Constitutional:       General: She is not in acute distress.  Eyes:      Extraocular Movements: Extraocular movements intact.   Cardiovascular:      Rate and Rhythm: Normal rate and regular rhythm.   Pulmonary:      Effort: Pulmonary effort is normal.      Breath sounds: Normal breath sounds.   Abdominal:      General: Bowel sounds are normal.      Palpations: Abdomen is soft.   Musculoskeletal:      Cervical back: Neck supple.      Right lower leg: No edema.      Left lower leg: No edema.   Neurological:      Mental Status: She is alert.   Psychiatric:         Mood and Affect: Mood normal.         Behavior: Behavior is cooperative.         Assessment/Plan  Problem List Items Addressed This Visit       Emphysema lung (CMS/HCC)      Stable  Monitor for new or worsening symptoms         Primary hypertension     Controlled  Continue to monitor         Weakness - Primary       therapy          Medications, treatments, and labs reviewed  Continue medications and treatments as listed in [facility]    Scribe Attestation  By signing my name below, I, Leslye Yost   attest that this documentation has been prepared under the direction and in the presence of Corrie Desouza MD.    Provider Attestation - Scribe documentation  All medical record entries made by the Scribe were at my direction and personally dictated by me. I have reviewed the chart and agree that the record accurately reflects my personal performance of  the history, physical exam, discussion and plan.    1. Weakness        2. Primary hypertension        3. Pulmonary emphysema, unspecified emphysema type (CMS/HCC)               Electronically Signed By: Corrie Desouza MD   9/26/23 10:53 AM

## 2023-09-01 ENCOUNTER — NURSING HOME VISIT (OUTPATIENT)
Dept: POST ACUTE CARE | Facility: EXTERNAL LOCATION | Age: 88
End: 2023-09-01
Payer: MEDICARE

## 2023-09-01 DIAGNOSIS — R60.0 LOCALIZED EDEMA: Primary | ICD-10-CM

## 2023-09-01 DIAGNOSIS — I10 PRIMARY HYPERTENSION: ICD-10-CM

## 2023-09-01 PROCEDURE — 99308 SBSQ NF CARE LOW MDM 20: CPT | Performed by: INTERNAL MEDICINE

## 2023-09-01 NOTE — LETTER
Patient: Ruth Wheatley  : 10/30/1929    Encounter Date: 2023    PROGRESS NOTE    Subjective  Chief complaint: Ruth Wheatley is a 89 y.o. female who is a long term care patient being seen and evaluated for edema    HPI:   patient presents with complaints of increased leg edema.  Denies pain associated.   No weeping or redness noted.  denies chest pain or headache.   No acute distress.      Objective  Vital signs: 129/76, 98%    Physical Exam  Constitutional:       General: She is not in acute distress.  Eyes:      Extraocular Movements: Extraocular movements intact.   Cardiovascular:      Rate and Rhythm: Normal rate and regular rhythm.   Pulmonary:      Effort: Pulmonary effort is normal.      Breath sounds: Normal breath sounds.   Abdominal:      General: Bowel sounds are normal.      Palpations: Abdomen is soft.   Musculoskeletal:      Cervical back: Neck supple.      Right lower leg: Edema present.      Left lower leg: Edema present.   Neurological:      Mental Status: She is alert.   Psychiatric:         Mood and Affect: Mood normal.         Behavior: Behavior is cooperative.         Assessment/Plan  Problem List Items Addressed This Visit       Primary hypertension     Controlled  Continue to monitor         Edema - Primary     To BLE  Elevate as tolerated   Continue to wear Eduardo hose  Monitor            Medications, treatments, and labs reviewed  Continue medications and treatments as listed in [facility]    Scribe Attestation  By signing my name below, I, Leslye Yost   attest that this documentation has been prepared under the direction and in the presence of Corrie Desouza MD.    Provider Attestation - Scribe documentation  All medical record entries made by the Scribe were at my direction and personally dictated by me. I have reviewed the chart and agree that the record accurately reflects my personal performance of the history, physical exam, discussion and plan.    1. Localized edema         2. Primary hypertension               Electronically Signed By: Corrie Desouza MD   9/5/23  7:48 PM

## 2023-09-05 PROBLEM — R60.9 EDEMA: Status: ACTIVE | Noted: 2023-09-05

## 2023-09-05 NOTE — PROGRESS NOTES
PROGRESS NOTE    Subjective   Chief complaint: Ruth Wheatley is a 89 y.o. female who is a long term care patient being seen and evaluated for edema    HPI:   patient presents with complaints of increased leg edema.  Denies pain associated.   No weeping or redness noted.  denies chest pain or headache.   No acute distress.      Objective   Vital signs: 129/76, 98%    Physical Exam  Constitutional:       General: She is not in acute distress.  Eyes:      Extraocular Movements: Extraocular movements intact.   Cardiovascular:      Rate and Rhythm: Normal rate and regular rhythm.   Pulmonary:      Effort: Pulmonary effort is normal.      Breath sounds: Normal breath sounds.   Abdominal:      General: Bowel sounds are normal.      Palpations: Abdomen is soft.   Musculoskeletal:      Cervical back: Neck supple.      Right lower leg: Edema present.      Left lower leg: Edema present.   Neurological:      Mental Status: She is alert.   Psychiatric:         Mood and Affect: Mood normal.         Behavior: Behavior is cooperative.         Assessment/Plan   Problem List Items Addressed This Visit       Primary hypertension     Controlled  Continue to monitor         Edema - Primary     To BLE  Elevate as tolerated   Continue to wear Eduardo hose  Monitor            Medications, treatments, and labs reviewed  Continue medications and treatments as listed in [facility]    Scribe Attestation  By signing my name below, I, Leslye Yost   attest that this documentation has been prepared under the direction and in the presence of Corrie Desouza MD.    Provider Attestation - Scribe documentation  All medical record entries made by the Scribe were at my direction and personally dictated by me. I have reviewed the chart and agree that the record accurately reflects my personal performance of the history, physical exam, discussion and plan.    1. Localized edema        2. Primary hypertension

## 2023-09-06 ENCOUNTER — NURSING HOME VISIT (OUTPATIENT)
Dept: POST ACUTE CARE | Facility: EXTERNAL LOCATION | Age: 88
End: 2023-09-06
Payer: MEDICARE

## 2023-09-06 DIAGNOSIS — R53.1 WEAKNESS: Primary | ICD-10-CM

## 2023-09-06 DIAGNOSIS — I10 PRIMARY HYPERTENSION: ICD-10-CM

## 2023-09-06 PROCEDURE — 99308 SBSQ NF CARE LOW MDM 20: CPT | Performed by: INTERNAL MEDICINE

## 2023-09-06 NOTE — LETTER
Patient: Ruth Wheatley  : 10/30/1929    Encounter Date: 2023    PROGRESS NOTE    Subjective  Chief complaint: Ruth Wheatley is a 89 y.o. female who is a long term care patient being seen and evaluated for weakness    HPI:  Patient continues to work in therapy due to weakness and debility. Requires assistance for transfers, ADL's and mobility. No new issues at this time. No acute distress. Denies chest pain or headache.       Objective  Vital signs: 126/74, 98%    Physical Exam  Constitutional:       General: She is not in acute distress.  Eyes:      Extraocular Movements: Extraocular movements intact.   Cardiovascular:      Rate and Rhythm: Normal rate and regular rhythm.   Pulmonary:      Effort: Pulmonary effort is normal.      Breath sounds: Normal breath sounds.   Abdominal:      General: Bowel sounds are normal.      Palpations: Abdomen is soft.   Musculoskeletal:      Cervical back: Neck supple.      Right lower leg: No edema.      Left lower leg: No edema.   Neurological:      Mental Status: She is alert.   Psychiatric:         Mood and Affect: Mood normal.         Behavior: Behavior is cooperative.         Assessment/Plan  Problem List Items Addressed This Visit       Primary hypertension     Controlled  Continue to monitor         Weakness - Primary      continue therapy        Medications, treatments, and labs reviewed  Continue medications and treatments as listed in [facility]    Scribe Attestation  By signing my name below, ITahmina Scribe   attest that this documentation has been prepared under the direction and in the presence of Corrie Desouza MD.    Provider Attestation - Scribe documentation  All medical record entries made by the Scribe were at my direction and personally dictated by me. I have reviewed the chart and agree that the record accurately reflects my personal performance of the history, physical exam, discussion and plan.    1. Weakness        2. Primary hypertension                Electronically Signed By: Corrie Desouza MD   9/7/23  3:12 PM

## 2023-09-07 NOTE — PROGRESS NOTES
PROGRESS NOTE    Subjective   Chief complaint: Ruth Wheatley is a 89 y.o. female who is a long term care patient being seen and evaluated for weakness    HPI:  Patient continues to work in therapy due to weakness and debility. Requires assistance for transfers, ADL's and mobility. No new issues at this time. No acute distress. Denies chest pain or headache.       Objective   Vital signs: 126/74, 98%    Physical Exam  Constitutional:       General: She is not in acute distress.  Eyes:      Extraocular Movements: Extraocular movements intact.   Cardiovascular:      Rate and Rhythm: Normal rate and regular rhythm.   Pulmonary:      Effort: Pulmonary effort is normal.      Breath sounds: Normal breath sounds.   Abdominal:      General: Bowel sounds are normal.      Palpations: Abdomen is soft.   Musculoskeletal:      Cervical back: Neck supple.      Right lower leg: No edema.      Left lower leg: No edema.   Neurological:      Mental Status: She is alert.   Psychiatric:         Mood and Affect: Mood normal.         Behavior: Behavior is cooperative.         Assessment/Plan   Problem List Items Addressed This Visit       Primary hypertension     Controlled  Continue to monitor         Weakness - Primary      continue therapy        Medications, treatments, and labs reviewed  Continue medications and treatments as listed in [facility]    Scribe Attestation  By signing my name below, I, Leslye Yost   attest that this documentation has been prepared under the direction and in the presence of Corrie Desouza MD.    Provider Attestation - Scribe documentation  All medical record entries made by the Scribe were at my direction and personally dictated by me. I have reviewed the chart and agree that the record accurately reflects my personal performance of the history, physical exam, discussion and plan.    1. Weakness        2. Primary hypertension

## 2023-09-08 ENCOUNTER — NURSING HOME VISIT (OUTPATIENT)
Dept: POST ACUTE CARE | Facility: EXTERNAL LOCATION | Age: 88
End: 2023-09-08
Payer: MEDICARE

## 2023-09-08 DIAGNOSIS — M79.605 PAIN IN BOTH LOWER EXTREMITIES: ICD-10-CM

## 2023-09-08 DIAGNOSIS — F32.A DEPRESSION, UNSPECIFIED DEPRESSION TYPE: Primary | ICD-10-CM

## 2023-09-08 DIAGNOSIS — M79.604 PAIN IN BOTH LOWER EXTREMITIES: ICD-10-CM

## 2023-09-08 DIAGNOSIS — J43.9 PULMONARY EMPHYSEMA, UNSPECIFIED EMPHYSEMA TYPE (MULTI): ICD-10-CM

## 2023-09-08 DIAGNOSIS — I10 PRIMARY HYPERTENSION: ICD-10-CM

## 2023-09-08 PROCEDURE — 99309 SBSQ NF CARE MODERATE MDM 30: CPT | Performed by: INTERNAL MEDICINE

## 2023-09-08 NOTE — LETTER
Patient: Ruth Wheatley  : 10/30/1929    Encounter Date: 2023    PROGRESS NOTE    Subjective  Chief complaint: Ruth Wheatley is a 89 y.o. female who is a long term care patient being seen and evaluated for monthly general medical care and follow-up. Leg pain    HPI:   patient presents for general medical care and f/u.  Patient seen and examined at bedside.  No issues per nursing.  Patient has Complaint of leg pain.   Denies  trauma or fall,  no edema noted.  patient with diagnosis of depression.  Mood is stable.  Denies feeling down and thoughts of harming self or others. denies sob, wheezing, cough.   HTN BP at goal.  Denies chest pain and headache.  No acute distress.            Objective  Vital signs:  128/72, 98%    Physical Exam  Constitutional:       General: She is not in acute distress.  Eyes:      Extraocular Movements: Extraocular movements intact.   Cardiovascular:      Rate and Rhythm: Normal rate and regular rhythm.   Pulmonary:      Effort: Pulmonary effort is normal.      Breath sounds: Normal breath sounds.   Abdominal:      General: Bowel sounds are normal.      Palpations: Abdomen is soft.   Musculoskeletal:      Cervical back: Neck supple.      Right lower leg: No edema.      Left lower leg: No edema.   Neurological:      Mental Status: She is alert.   Psychiatric:         Mood and Affect: Mood normal.         Behavior: Behavior is cooperative.         Assessment/Plan  Problem List Items Addressed This Visit       Emphysema lung (CMS/HCC)     Continue Breo inhaler         Depression - Primary     Mood stable  Continue antihypertensives         Primary hypertension     Controlled  Continue to monitor         Leg pain     Tylenol BID              Medications, treatments, and labs reviewed  Continue medications and treatments as listed in [facility]    Scribe Attestation  By signing my name below, ITahmina, Brooklynnibdesean   attest that this documentation has been prepared under the direction and  in the presence of Corrie Desouza MD.    Provider Attestation - Scribe documentation  All medical record entries made by the Scribe were at my direction and personally dictated by me. I have reviewed the chart and agree that the record accurately reflects my personal performance of the history, physical exam, discussion and plan.    1. Depression, unspecified depression type        2. Primary hypertension        3. Pulmonary emphysema, unspecified emphysema type (CMS/HCC)        4. Pain in both lower extremities               Electronically Signed By: Corrie Desouza MD   9/11/23  2:35 PM

## 2023-09-11 NOTE — PROGRESS NOTES
PROGRESS NOTE    Subjective   Chief complaint: Ruth Wheatley is a 89 y.o. female who is a long term care patient being seen and evaluated for monthly general medical care and follow-up. Leg pain    HPI:   patient presents for general medical care and f/u.  Patient seen and examined at bedside.  No issues per nursing.  Patient has Complaint of leg pain.   Denies  trauma or fall,  no edema noted.  patient with diagnosis of depression.  Mood is stable.  Denies feeling down and thoughts of harming self or others. denies sob, wheezing, cough.   HTN BP at goal.  Denies chest pain and headache.  No acute distress.            Objective   Vital signs:  128/72, 98%    Physical Exam  Constitutional:       General: She is not in acute distress.  Eyes:      Extraocular Movements: Extraocular movements intact.   Cardiovascular:      Rate and Rhythm: Normal rate and regular rhythm.   Pulmonary:      Effort: Pulmonary effort is normal.      Breath sounds: Normal breath sounds.   Abdominal:      General: Bowel sounds are normal.      Palpations: Abdomen is soft.   Musculoskeletal:      Cervical back: Neck supple.      Right lower leg: No edema.      Left lower leg: No edema.   Neurological:      Mental Status: She is alert.   Psychiatric:         Mood and Affect: Mood normal.         Behavior: Behavior is cooperative.         Assessment/Plan   Problem List Items Addressed This Visit       Emphysema lung (CMS/HCC)     Continue Breo inhaler         Depression - Primary     Mood stable  Continue antihypertensives         Primary hypertension     Controlled  Continue to monitor         Leg pain     Tylenol BID              Medications, treatments, and labs reviewed  Continue medications and treatments as listed in [facility]    Scribe Attestation  By signing my name below, Tahmina GUSTAFSON, Leslye   attest that this documentation has been prepared under the direction and in the presence of Corrie Desouza MD.    Provider Attestation - Scribe  documentation  All medical record entries made by the Scribe were at my direction and personally dictated by me. I have reviewed the chart and agree that the record accurately reflects my personal performance of the history, physical exam, discussion and plan.    1. Depression, unspecified depression type        2. Primary hypertension        3. Pulmonary emphysema, unspecified emphysema type (CMS/HCC)        4. Pain in both lower extremities

## 2023-09-13 ENCOUNTER — NURSING HOME VISIT (OUTPATIENT)
Dept: POST ACUTE CARE | Facility: EXTERNAL LOCATION | Age: 88
End: 2023-09-13
Payer: MEDICARE

## 2023-09-13 DIAGNOSIS — R53.1 WEAKNESS: ICD-10-CM

## 2023-09-13 DIAGNOSIS — J43.9 PULMONARY EMPHYSEMA, UNSPECIFIED EMPHYSEMA TYPE (MULTI): Primary | ICD-10-CM

## 2023-09-13 PROCEDURE — 99308 SBSQ NF CARE LOW MDM 20: CPT | Performed by: INTERNAL MEDICINE

## 2023-09-13 NOTE — LETTER
Patient: Ruth Wheatley  : 10/30/1929    Encounter Date: 2023    PROGRESS NOTE    Subjective  Chief complaint: Ruth Wheatley is a 89 y.o. female who is a long term care patient being seen and evaluated for Weakness, cough    HPI:   patient recently discharged from therapy.   In addition patient has a chronic cough and has cough syrup as needed however she has been requesting it more routinely.   Denies congestion, nasal drainage, fever, chills or shortness of breath.  No acute distress.      Objective  Vital signs:  127/72, 97%    Physical Exam  Constitutional:       General: She is not in acute distress.  Eyes:      Extraocular Movements: Extraocular movements intact.   Cardiovascular:      Rate and Rhythm: Normal rate and regular rhythm.   Pulmonary:      Effort: Pulmonary effort is normal.      Breath sounds: Normal breath sounds.   Abdominal:      General: Bowel sounds are normal.      Palpations: Abdomen is soft.   Musculoskeletal:      Cervical back: Neck supple.      Right lower leg: No edema.      Left lower leg: No edema.   Neurological:      Mental Status: She is alert.   Psychiatric:         Mood and Affect: Mood normal.         Behavior: Behavior is cooperative.         Assessment/Plan  Problem List Items Addressed This Visit       Emphysema lung (CMS/HCC) - Primary      with chronic cough  Schedule cough syrup         Weakness      discharged from therapy          Medications, treatments, and labs reviewed  Continue medications and treatments as listed in [facility]    Scribe Attestation  By signing my name below, I, Leslye Yost   attest that this documentation has been prepared under the direction and in the presence of Corrie Desouza MD.    Provider Attestation - Scribe documentation  All medical record entries made by the Scribe were at my direction and personally dictated by me. I have reviewed the chart and agree that the record accurately reflects my personal performance of the  history, physical exam, discussion and plan.    1. Pulmonary emphysema, unspecified emphysema type (CMS/HCC)        2. Weakness               Electronically Signed By: Corrie Desouza MD   9/13/23  4:39 PM

## 2023-09-13 NOTE — PROGRESS NOTES
PROGRESS NOTE    Subjective   Chief complaint: Ruth Wheatley is a 89 y.o. female who is a long term care patient being seen and evaluated for Weakness, cough    HPI:   patient recently discharged from therapy.   In addition patient has a chronic cough and has cough syrup as needed however she has been requesting it more routinely.   Denies congestion, nasal drainage, fever, chills or shortness of breath.  No acute distress.      Objective   Vital signs:  127/72, 97%    Physical Exam  Constitutional:       General: She is not in acute distress.  Eyes:      Extraocular Movements: Extraocular movements intact.   Cardiovascular:      Rate and Rhythm: Normal rate and regular rhythm.   Pulmonary:      Effort: Pulmonary effort is normal.      Breath sounds: Normal breath sounds.   Abdominal:      General: Bowel sounds are normal.      Palpations: Abdomen is soft.   Musculoskeletal:      Cervical back: Neck supple.      Right lower leg: No edema.      Left lower leg: No edema.   Neurological:      Mental Status: She is alert.   Psychiatric:         Mood and Affect: Mood normal.         Behavior: Behavior is cooperative.         Assessment/Plan   Problem List Items Addressed This Visit       Emphysema lung (CMS/[facility]) - Primary      with chronic cough  Schedule cough syrup         Weakness      discharged from therapy          Medications, treatments, and labs reviewed  Continue medications and treatments as listed in [facility]    Scribe Attestation  By signing my name below, I, Leslye Yost   attest that this documentation has been prepared under the direction and in the presence of Corrie Desouza MD.    Provider Attestation - Scribe documentation  All medical record entries made by the Scribe were at my direction and personally dictated by me. I have reviewed the chart and agree that the record accurately reflects my personal performance of the history, physical exam, discussion and plan.    1. Pulmonary emphysema,  unspecified emphysema type (CMS/HCC)        2. Weakness

## 2023-09-20 ENCOUNTER — NURSING HOME VISIT (OUTPATIENT)
Dept: POST ACUTE CARE | Facility: EXTERNAL LOCATION | Age: 88
End: 2023-09-20
Payer: MEDICARE

## 2023-09-20 DIAGNOSIS — R53.1 WEAKNESS: Primary | ICD-10-CM

## 2023-09-20 DIAGNOSIS — F32.A DEPRESSION, UNSPECIFIED DEPRESSION TYPE: ICD-10-CM

## 2023-09-20 PROCEDURE — 99308 SBSQ NF CARE LOW MDM 20: CPT | Performed by: INTERNAL MEDICINE

## 2023-09-20 NOTE — LETTER
Patient: Ruth Wheatley  : 10/30/1929    Encounter Date: 2023    PROGRESS NOTE    Subjective  Chief complaint: Ruth Wheatley is a 89 y.o. female who is a long term care patient being seen and evaluated for weakness    HPI:  Patient working in therapy denies pain. Requires assistance for transfers, ADL's and mobility. Mood stable. No nw concerns today.       Objective  Vital signs: 119/64, 98%    Physical Exam  Constitutional:       General: She is not in acute distress.  Eyes:      Extraocular Movements: Extraocular movements intact.   Cardiovascular:      Rate and Rhythm: Normal rate and regular rhythm.   Pulmonary:      Effort: Pulmonary effort is normal.      Breath sounds: Normal breath sounds.   Abdominal:      General: Bowel sounds are normal.      Palpations: Abdomen is soft.   Musculoskeletal:      Cervical back: Neck supple.      Right lower leg: No edema.      Left lower leg: No edema.   Neurological:      Mental Status: She is alert.   Psychiatric:         Mood and Affect: Mood normal.         Behavior: Behavior is cooperative.         Assessment/Plan  Problem List Items Addressed This Visit       Depression     Mood stable  Continue antihypertensives         Weakness - Primary       therapy          Medications, treatments, and labs reviewed  Continue medications and treatments as listed in [facility]    Scribe Attestation  By signing my name below, ITahmina Scribe   attest that this documentation has been prepared under the direction and in the presence of Corrie Desouza MD.    Provider Attestation - Scribe documentation  All medical record entries made by the Scribe were at my direction and personally dictated by me. I have reviewed the chart and agree that the record accurately reflects my personal performance of the history, physical exam, discussion and plan.  1. Weakness        2. Depression, unspecified depression type            1. Weakness        2. Depression, unspecified  depression type               Electronically Signed By: Corrie Desouza MD   9/21/23 12:55 PM

## 2023-09-21 NOTE — PROGRESS NOTES
PROGRESS NOTE    Subjective   Chief complaint: Ruth Wheatley is a 89 y.o. female who is a long term care patient being seen and evaluated for weakness    HPI:  Patient working in therapy denies pain. Requires assistance for transfers, ADL's and mobility. Mood stable. No nw concerns today.       Objective   Vital signs: 119/64, 98%    Physical Exam  Constitutional:       General: She is not in acute distress.  Eyes:      Extraocular Movements: Extraocular movements intact.   Cardiovascular:      Rate and Rhythm: Normal rate and regular rhythm.   Pulmonary:      Effort: Pulmonary effort is normal.      Breath sounds: Normal breath sounds.   Abdominal:      General: Bowel sounds are normal.      Palpations: Abdomen is soft.   Musculoskeletal:      Cervical back: Neck supple.      Right lower leg: No edema.      Left lower leg: No edema.   Neurological:      Mental Status: She is alert.   Psychiatric:         Mood and Affect: Mood normal.         Behavior: Behavior is cooperative.         Assessment/Plan   Problem List Items Addressed This Visit       Depression     Mood stable  Continue antihypertensives         Weakness - Primary       therapy          Medications, treatments, and labs reviewed  Continue medications and treatments as listed in [facility]    Scribe Attestation  By signing my name below, I, Leslye Yost   attest that this documentation has been prepared under the direction and in the presence of Corrie Desouza MD.    Provider Attestation - Scribe documentation  All medical record entries made by the Scribe were at my direction and personally dictated by me. I have reviewed the chart and agree that the record accurately reflects my personal performance of the history, physical exam, discussion and plan.  1. Weakness        2. Depression, unspecified depression type            1. Weakness        2. Depression, unspecified depression type

## 2023-09-22 ENCOUNTER — NURSING HOME VISIT (OUTPATIENT)
Dept: POST ACUTE CARE | Facility: EXTERNAL LOCATION | Age: 88
End: 2023-09-22
Payer: MEDICARE

## 2023-09-22 DIAGNOSIS — I10 PRIMARY HYPERTENSION: Primary | ICD-10-CM

## 2023-09-22 DIAGNOSIS — E55.9 VITAMIN D DEFICIENCY: ICD-10-CM

## 2023-09-22 PROCEDURE — 99308 SBSQ NF CARE LOW MDM 20: CPT | Performed by: INTERNAL MEDICINE

## 2023-09-22 NOTE — PROGRESS NOTES
PROGRESS NOTE    Subjective   Chief complaint: Ruth Wheatley is a 89 y.o. female who is a long term care patient being seen and evaluated for [facility] medical care    HPI:  I was asked to see patient and clarify order for Vit D order, which she has been taking for supplement. Denies chest pain or headache. No acute distress.       Objective   Vital signs: 123/66, 98%    Physical Exam  Constitutional:       General: She is not in acute distress.  Eyes:      Extraocular Movements: Extraocular movements intact.   Cardiovascular:      Rate and Rhythm: Normal rate and regular rhythm.   Pulmonary:      Effort: Pulmonary effort is normal.      Breath sounds: Normal breath sounds.   Abdominal:      General: Bowel sounds are normal.      Palpations: Abdomen is soft.   Musculoskeletal:      Cervical back: Neck supple.      Right lower leg: No edema.      Left lower leg: No edema.   Neurological:      Mental Status: She is alert.   Psychiatric:         Mood and Affect: Mood normal.         Behavior: Behavior is cooperative.         Assessment/Plan   Problem List Items Addressed This Visit       Primary hypertension - Primary     Controlled  Continue to monitor         Vitamin D deficiency     Clarification for diagnosis  Continue Vit D   Obtain Vit d level yearly          Medications, treatments, and labs reviewed  Continue medications and treatments as listed in [facility]    Scribe Attestation  By signing my name below, I, Leslye Yost   attest that this documentation has been prepared under the direction and in the presence of Corrie Desouza MD.    Provider Attestation - Scribe documentation  All medical record entries made by the Scribe were at my direction and personally dictated by me. I have reviewed the chart and agree that the record accurately reflects my personal performance of the history, physical exam, discussion and plan.    1. Primary hypertension        2. Vitamin D deficiency

## 2023-09-22 NOTE — LETTER
Patient: Ruth Wheatley  : 10/30/1929    Encounter Date: 2023    PROGRESS NOTE    Subjective  Chief complaint: Ruth Wheatley is a 89 y.o. female who is a long term care patient being seen and evaluated for [facility] medical care    HPI:  I was asked to see patient and clarify order for Vit D order, which she has been taking for supplement. Denies chest pain or headache. No acute distress.       Objective  Vital signs: 123/66, 98%    Physical Exam  Constitutional:       General: She is not in acute distress.  Eyes:      Extraocular Movements: Extraocular movements intact.   Cardiovascular:      Rate and Rhythm: Normal rate and regular rhythm.   Pulmonary:      Effort: Pulmonary effort is normal.      Breath sounds: Normal breath sounds.   Abdominal:      General: Bowel sounds are normal.      Palpations: Abdomen is soft.   Musculoskeletal:      Cervical back: Neck supple.      Right lower leg: No edema.      Left lower leg: No edema.   Neurological:      Mental Status: She is alert.   Psychiatric:         Mood and Affect: Mood normal.         Behavior: Behavior is cooperative.         Assessment/Plan  Problem List Items Addressed This Visit       Primary hypertension - Primary     Controlled  Continue to monitor         Vitamin D deficiency     Clarification for diagnosis  Continue Vit D   Obtain Vit d level yearly          Medications, treatments, and labs reviewed  Continue medications and treatments as listed in [facility]    Scribe Attestation  By signing my name below, I, Leslye Yost   attest that this documentation has been prepared under the direction and in the presence of Corrie Desouza MD.    Provider Attestation - Scribe documentation  All medical record entries made by the Scribe were at my direction and personally dictated by me. I have reviewed the chart and agree that the record accurately reflects my personal performance of the history, physical exam, discussion and plan.    1. Primary  hypertension        2. Vitamin D deficiency               Electronically Signed By: Corrie Desouza MD   9/22/23  5:14 PM

## 2023-09-26 NOTE — PROGRESS NOTES
PROGRESS NOTE    Subjective   Chief complaint: Ruth Wheatley is a 89 y.o. female who is a long term care patient being seen and evaluated for weakness    HPI:  Patient is working in therapy due to weakness and debility. Requires assistance for transfers, ADL's and mobility. No new issues at this time. Denies chest pain or headache. Denies SOB or orthopnea. No acute distress.       Objective   Vital signs: 124/76, 98%    Physical Exam  Constitutional:       General: She is not in acute distress.  Eyes:      Extraocular Movements: Extraocular movements intact.   Cardiovascular:      Rate and Rhythm: Normal rate and regular rhythm.   Pulmonary:      Effort: Pulmonary effort is normal.      Breath sounds: Normal breath sounds.   Abdominal:      General: Bowel sounds are normal.      Palpations: Abdomen is soft.   Musculoskeletal:      Cervical back: Neck supple.      Right lower leg: No edema.      Left lower leg: No edema.   Neurological:      Mental Status: She is alert.   Psychiatric:         Mood and Affect: Mood normal.         Behavior: Behavior is cooperative.         Assessment/Plan   Problem List Items Addressed This Visit       Emphysema lung (CMS/HCC)      Stable  Monitor for new or worsening symptoms         Primary hypertension     Controlled  Continue to monitor         Weakness - Primary       therapy          Medications, treatments, and labs reviewed  Continue medications and treatments as listed in [facility]    Scribe Attestation  By signing my name below, I, Leslye Yost   attest that this documentation has been prepared under the direction and in the presence of Corrie Desouza MD.    Provider Attestation - Scribe documentation  All medical record entries made by the Scribe were at my direction and personally dictated by me. I have reviewed the chart and agree that the record accurately reflects my personal performance of the history, physical exam, discussion and plan.    1. Weakness        2.  Primary hypertension        3. Pulmonary emphysema, unspecified emphysema type (CMS/HCC)

## 2023-09-27 ENCOUNTER — NURSING HOME VISIT (OUTPATIENT)
Dept: POST ACUTE CARE | Facility: EXTERNAL LOCATION | Age: 88
End: 2023-09-27
Payer: MEDICARE

## 2023-09-27 DIAGNOSIS — R53.1 WEAKNESS: Primary | ICD-10-CM

## 2023-09-27 DIAGNOSIS — I10 PRIMARY HYPERTENSION: ICD-10-CM

## 2023-09-27 PROCEDURE — 99308 SBSQ NF CARE LOW MDM 20: CPT | Performed by: INTERNAL MEDICINE

## 2023-09-27 NOTE — LETTER
Patient: Ruth Wheatley  : 10/30/1929    Encounter Date: 2023    PROGRESS NOTE    Subjective  Chief complaint: Ruth Wheatley is a 89 y.o. female who is a long term care patient being seen and evaluated for   Weakness    HPI:   patient has no new complaints or concerns today.  Patient is working in therapy.  Denies n/v/f/c.   Patient requires assistance for transfers ADLs and mobility.   Denies chest pain or headache.   No acute distress.      Objective  Vital signs:  126/72, 98%    Physical Exam  Constitutional:       General: She is not in acute distress.  Eyes:      Extraocular Movements: Extraocular movements intact.   Cardiovascular:      Rate and Rhythm: Normal rate and regular rhythm.   Pulmonary:      Effort: Pulmonary effort is normal.      Breath sounds: Normal breath sounds.   Abdominal:      General: Bowel sounds are normal.      Palpations: Abdomen is soft.   Musculoskeletal:      Cervical back: Neck supple.      Right lower leg: No edema.      Left lower leg: No edema.   Neurological:      Mental Status: She is alert.   Psychiatric:         Mood and Affect: Mood normal.         Behavior: Behavior is cooperative.         Assessment/Plan  Problem List Items Addressed This Visit       Primary hypertension     Controlled  Continue to monitor         Weakness - Primary       therapy          Medications, treatments, and labs reviewed  Continue medications and treatments as listed in [facility]    Scribe Attestation  By signing my name below, I, Leslye Yost   attest that this documentation has been prepared under the direction and in the presence of Corrie Desouza MD.    Provider Attestation - Scribe documentation  All medical record entries made by the Scribe were at my direction and personally dictated by me. I have reviewed the chart and agree that the record accurately reflects my personal performance of the history, physical exam, discussion and plan.    1. Weakness        2. Primary  hypertension               Electronically Signed By: Corrie Desouza MD   9/28/23 11:19 AM

## 2023-09-28 NOTE — PROGRESS NOTES
PROGRESS NOTE    Subjective   Chief complaint: Ruth Wheatley is a 89 y.o. female who is a long term care patient being seen and evaluated for   Weakness    HPI:   patient has no new complaints or concerns today.  Patient is working in therapy.  Denies n/v/f/c.   Patient requires assistance for transfers ADLs and mobility.   Denies chest pain or headache.   No acute distress.      Objective   Vital signs:  126/72, 98%    Physical Exam  Constitutional:       General: She is not in acute distress.  Eyes:      Extraocular Movements: Extraocular movements intact.   Cardiovascular:      Rate and Rhythm: Normal rate and regular rhythm.   Pulmonary:      Effort: Pulmonary effort is normal.      Breath sounds: Normal breath sounds.   Abdominal:      General: Bowel sounds are normal.      Palpations: Abdomen is soft.   Musculoskeletal:      Cervical back: Neck supple.      Right lower leg: No edema.      Left lower leg: No edema.   Neurological:      Mental Status: She is alert.   Psychiatric:         Mood and Affect: Mood normal.         Behavior: Behavior is cooperative.         Assessment/Plan   Problem List Items Addressed This Visit       Primary hypertension     Controlled  Continue to monitor         Weakness - Primary       therapy          Medications, treatments, and labs reviewed  Continue medications and treatments as listed in [facility]    Scribe Attestation  By signing my name below, I, Leslye Yost   attest that this documentation has been prepared under the direction and in the presence of Corrie Desouza MD.    Provider Attestation - Scribe documentation  All medical record entries made by the Scribe were at my direction and personally dictated by me. I have reviewed the chart and agree that the record accurately reflects my personal performance of the history, physical exam, discussion and plan.    1. Weakness        2. Primary hypertension

## 2023-10-04 ENCOUNTER — NURSING HOME VISIT (OUTPATIENT)
Dept: POST ACUTE CARE | Facility: EXTERNAL LOCATION | Age: 88
End: 2023-10-04
Payer: MEDICARE

## 2023-10-04 DIAGNOSIS — F32.A DEPRESSION, UNSPECIFIED DEPRESSION TYPE: ICD-10-CM

## 2023-10-04 DIAGNOSIS — R53.1 WEAKNESS: Primary | ICD-10-CM

## 2023-10-04 DIAGNOSIS — I10 PRIMARY HYPERTENSION: ICD-10-CM

## 2023-10-04 PROCEDURE — 99308 SBSQ NF CARE LOW MDM 20: CPT | Performed by: INTERNAL MEDICINE

## 2023-10-04 NOTE — LETTER
Patient: Ruth Wheatley  : 10/30/1929    Encounter Date: 10/04/2023    PROGRESS NOTE    Subjective  Chief complaint: Ruth Wheatley is a 89 y.o. female who is a long term care patient being seen and evaluated for   Weakness    HPI:   patient has no new complaints or concerns today.  Patient is working in therapy.  Denies n/v/f/c.   Patient requires assistance for transfers ADLs and mobility.   Denies chest pain or headache.  Mood has been stable, denies feeling down   No acute distress.      Objective  Vital signs:  126/72, 98%    Physical Exam  Constitutional:       General: She is not in acute distress.  Eyes:      Extraocular Movements: Extraocular movements intact.   Cardiovascular:      Rate and Rhythm: Normal rate and regular rhythm.   Pulmonary:      Effort: Pulmonary effort is normal.      Breath sounds: Normal breath sounds.   Abdominal:      General: Bowel sounds are normal.      Palpations: Abdomen is soft.   Musculoskeletal:      Cervical back: Neck supple.      Right lower leg: No edema.      Left lower leg: No edema.   Neurological:      Mental Status: She is alert.   Psychiatric:         Mood and Affect: Mood normal.         Behavior: Behavior is cooperative.         Assessment/Plan  Problem List Items Addressed This Visit       Depression     Mood stable  Continue antihypertensives         Primary hypertension     Controlled  Continue to monitor         Weakness - Primary       therapy        Medications, treatments, and labs reviewed  Continue medications and treatments as listed in [facility]    Scribe Attestation  By signing my name below, I, Leslye Yost   attest that this documentation has been prepared under the direction and in the presence of Corrie Desouza MD.    Provider Attestation - Scribe documentation  All medical record entries made by the Scribe were at my direction and personally dictated by me. I have reviewed the chart and agree that the record accurately reflects my  personal performance of the history, physical exam, discussion and plan.    1. Weakness        2. Primary hypertension        3. Depression, unspecified depression type               Electronically Signed By: Corrie Desouza MD   10/5/23  1:05 PM

## 2023-10-05 ENCOUNTER — NURSING HOME VISIT (OUTPATIENT)
Dept: POST ACUTE CARE | Facility: EXTERNAL LOCATION | Age: 88
End: 2023-10-05
Payer: MEDICARE

## 2023-10-05 DIAGNOSIS — M25.562 CHRONIC PAIN OF LEFT KNEE: ICD-10-CM

## 2023-10-05 DIAGNOSIS — I10 HYPERTENSION, ESSENTIAL: Primary | ICD-10-CM

## 2023-10-05 DIAGNOSIS — G89.29 CHRONIC PAIN OF LEFT KNEE: ICD-10-CM

## 2023-10-05 DIAGNOSIS — J44.9 COPD WITHOUT EXACERBATION (MULTI): ICD-10-CM

## 2023-10-05 PROCEDURE — 99309 SBSQ NF CARE MODERATE MDM 30: CPT | Performed by: NURSE PRACTITIONER

## 2023-10-05 NOTE — LETTER
Patient: Ruth Wheatley  : 10/30/1929    Encounter Date: 10/05/2023    PROGRESS NOTE    Subjective  Chief complaint: Ruth Wheatley is a 89 y.o. female who is a long term care patient being seen and evaluated for high blood pressure and chronic knee pain.    HPI:  HPI   Patient being seen for high blood pressure at times.  Denies chest pain and sob.  Also has complaint of chronic left knee pain.  Not new nor worse than usual.  States that Tylenol usually helps.  No further concerns reported.    Objective  Vital signs: 130/66, 84  Physical Exam  Constitutional:       General: She is not in acute distress.  Eyes:      Extraocular Movements: Extraocular movements intact.   Cardiovascular:      Rate and Rhythm: Normal rate and regular rhythm.   Pulmonary:      Effort: Pulmonary effort is normal.      Breath sounds: Normal breath sounds.   Musculoskeletal:      Cervical back: Neck supple.      Right lower leg: No edema.      Left lower leg: No edema.      Comments: B/l knees no swelling or discoloration, tender to palpation   Neurological:      Mental Status: She is alert.   Psychiatric:         Mood and Affect: Mood normal.         Behavior: Behavior is cooperative.         Assessment/Plan  Problem List Items Addressed This Visit       COPD without exacerbation (CMS/HCC)     Stable  Continue Breo inhaler  Aerosol treatment         Hypertension, essential - Primary     Uncontrolled  Increase Norvasc to 5 mg every day  Continue Carvedilol and Cozaar  Continue to monitor BP         Left knee pain     Start Tylenol tid          Medications, treatments, and labs reviewed  Continue medications and treatments as listed in EMR    Scribe Attestation  I, Leslye Sifuentes   attest that this documentation has been prepared under the direction and in the presence of ISAURO Lorenzana    Provider Attestation - Scribe documentation  All medical record entries made by the Scribe were at my direction and personally  dictated by me. I have reviewed the chart and agree that the record accurately reflects my personal performance of the history, physical exam, discussion and plan.   ISAURO Lorenzana        Electronically Signed By: ISAURO Lorenzana   10/11/23  2:48 PM

## 2023-10-05 NOTE — PROGRESS NOTES
PROGRESS NOTE    Subjective   Chief complaint: Ruth Wheatley is a 89 y.o. female who is a long term care patient being seen and evaluated for   Weakness    HPI:   patient has no new complaints or concerns today.  Patient is working in therapy.  Denies n/v/f/c.   Patient requires assistance for transfers ADLs and mobility.   Denies chest pain or headache.  Mood has been stable, denies feeling down   No acute distress.      Objective   Vital signs:  126/72, 98%    Physical Exam  Constitutional:       General: She is not in acute distress.  Eyes:      Extraocular Movements: Extraocular movements intact.   Cardiovascular:      Rate and Rhythm: Normal rate and regular rhythm.   Pulmonary:      Effort: Pulmonary effort is normal.      Breath sounds: Normal breath sounds.   Abdominal:      General: Bowel sounds are normal.      Palpations: Abdomen is soft.   Musculoskeletal:      Cervical back: Neck supple.      Right lower leg: No edema.      Left lower leg: No edema.   Neurological:      Mental Status: She is alert.   Psychiatric:         Mood and Affect: Mood normal.         Behavior: Behavior is cooperative.         Assessment/Plan   Problem List Items Addressed This Visit       Depression     Mood stable  Continue antihypertensives         Primary hypertension     Controlled  Continue to monitor         Weakness - Primary       therapy        Medications, treatments, and labs reviewed  Continue medications and treatments as listed in [facility]    Scribe Attestation  By signing my name below, I, Leslye Yost   attest that this documentation has been prepared under the direction and in the presence of Corrie Desouza MD.    Provider Attestation - Scribe documentation  All medical record entries made by the Scribe were at my direction and personally dictated by me. I have reviewed the chart and agree that the record accurately reflects my personal performance of the history, physical exam, discussion and plan.    1.  Weakness        2. Primary hypertension        3. Depression, unspecified depression type

## 2023-10-06 ENCOUNTER — NURSING HOME VISIT (OUTPATIENT)
Dept: POST ACUTE CARE | Facility: EXTERNAL LOCATION | Age: 88
End: 2023-10-06
Payer: MEDICARE

## 2023-10-06 DIAGNOSIS — F32.A DEPRESSION, UNSPECIFIED DEPRESSION TYPE: ICD-10-CM

## 2023-10-06 DIAGNOSIS — J43.9 PULMONARY EMPHYSEMA, UNSPECIFIED EMPHYSEMA TYPE (MULTI): ICD-10-CM

## 2023-10-06 DIAGNOSIS — I10 PRIMARY HYPERTENSION: Primary | ICD-10-CM

## 2023-10-06 PROCEDURE — 99309 SBSQ NF CARE MODERATE MDM 30: CPT | Performed by: INTERNAL MEDICINE

## 2023-10-06 NOTE — LETTER
Patient: Ruth Wheatley  : 10/30/1929    Encounter Date: 10/06/2023    PROGRESS NOTE    Subjective  Chief complaint: Ruth Wheatley is a 89 y.o. female who is a long term care patient being seen and evaluated for monthly general medical care and follow-up, Covid    HPI:   patient presents for general medical care and f/u.  Patient seen and examined at bedside.  She  denies sob, wheezing, cough.  HTN,  Denies chest pain and headache.    Patient with diagnosis of depression.  Denies feeling down and thoughts of harming self or others. No acute distress.      Objective  Vital signs: 129/73, 96%    Physical Exam  Constitutional:       General: She is not in acute distress.  Eyes:      Extraocular Movements: Extraocular movements intact.   Pulmonary:      Effort: Pulmonary effort is normal.      Breath sounds: Normal breath sounds.   Abdominal:      General: Bowel sounds are normal.      Palpations: Abdomen is soft.   Musculoskeletal:      Cervical back: Neck supple.      Right lower leg: Edema present.      Left lower leg: Edema present.   Neurological:      Mental Status: She is alert.   Psychiatric:         Mood and Affect: Mood normal.         Behavior: Behavior is cooperative.         Assessment/Plan  Problem List Items Addressed This Visit       Emphysema lung (CMS/HCC)      Stable  Monitor for new or worsening symptoms         Depression     Mood stable  Continue antihypertensives         Primary hypertension - Primary     Controlled  Continue to monitor        Medications, treatments, and labs reviewed  Continue medications and treatments as listed in [facility]    Scribe Attestation  By signing my name below, Tahmina GUSTAFSON Scribe   attest that this documentation has been prepared under the direction and in the presence of Corrie Desouza MD.    Provider Attestation - Scribe documentation  All medical record entries made by the Scribe were at my direction and personally dictated by me. I have reviewed the chart  and agree that the record accurately reflects my personal performance of the history, physical exam, discussion and plan.  1. Primary hypertension        2. Pulmonary emphysema, unspecified emphysema type (CMS/HCC)        3. Depression, unspecified depression type              Electronically Signed By: Corrie Desouza MD   10/6/23  5:48 PM

## 2023-10-06 NOTE — PROGRESS NOTES
PROGRESS NOTE    Subjective   Chief complaint: Rtuh Wheatley is a 89 y.o. female who is a long term care patient being seen and evaluated for monthly general medical care and follow-up, Covid    HPI:   patient presents for general medical care and f/u.  Patient seen and examined at bedside.  She  denies sob, wheezing, cough.  HTN,  Denies chest pain and headache.    Patient with diagnosis of depression.  Denies feeling down and thoughts of harming self or others. No acute distress.      Objective   Vital signs: 129/73, 96%    Physical Exam  Constitutional:       General: She is not in acute distress.  Eyes:      Extraocular Movements: Extraocular movements intact.   Pulmonary:      Effort: Pulmonary effort is normal.      Breath sounds: Normal breath sounds.   Abdominal:      General: Bowel sounds are normal.      Palpations: Abdomen is soft.   Musculoskeletal:      Cervical back: Neck supple.      Right lower leg: Edema present.      Left lower leg: Edema present.   Neurological:      Mental Status: She is alert.   Psychiatric:         Mood and Affect: Mood normal.         Behavior: Behavior is cooperative.         Assessment/Plan   Problem List Items Addressed This Visit       Emphysema lung (CMS/HCC)      Stable  Monitor for new or worsening symptoms         Depression     Mood stable  Continue antihypertensives         Primary hypertension - Primary     Controlled  Continue to monitor        Medications, treatments, and labs reviewed  Continue medications and treatments as listed in [facility]    Scribe Attestation  By signing my name below, I, Leslye Yost   attest that this documentation has been prepared under the direction and in the presence of Corrie Desouza MD.    Provider Attestation - Scribe documentation  All medical record entries made by the Scribe were at my direction and personally dictated by me. I have reviewed the chart and agree that the record accurately reflects my personal performance  of the history, physical exam, discussion and plan.  1. Primary hypertension        2. Pulmonary emphysema, unspecified emphysema type (CMS/HCC)        3. Depression, unspecified depression type

## 2023-10-09 PROBLEM — M25.562 LEFT KNEE PAIN: Status: ACTIVE | Noted: 2023-10-09

## 2023-10-09 NOTE — PROGRESS NOTES
PROGRESS NOTE    Subjective   Chief complaint: Ruth Wheatley is a 89 y.o. female who is a long term care patient being seen and evaluated for high blood pressure and chronic knee pain.    HPI:  HPI   Patient being seen for high blood pressure at times.  Denies chest pain and sob.  Also has complaint of chronic left knee pain.  Not new nor worse than usual.  States that Tylenol usually helps.  No further concerns reported.    Objective   Vital signs: 130/66, 84  Physical Exam  Constitutional:       General: She is not in acute distress.  Eyes:      Extraocular Movements: Extraocular movements intact.   Cardiovascular:      Rate and Rhythm: Normal rate and regular rhythm.   Pulmonary:      Effort: Pulmonary effort is normal.      Breath sounds: Normal breath sounds.   Musculoskeletal:      Cervical back: Neck supple.      Right lower leg: No edema.      Left lower leg: No edema.      Comments: B/l knees no swelling or discoloration, tender to palpation   Neurological:      Mental Status: She is alert.   Psychiatric:         Mood and Affect: Mood normal.         Behavior: Behavior is cooperative.         Assessment/Plan   Problem List Items Addressed This Visit       COPD without exacerbation (CMS/[facility])     Stable  Continue Breo inhaler  Aerosol treatment         Hypertension, essential - Primary     Uncontrolled  Increase Norvasc to 5 mg every day  Continue Carvedilol and Cozaar  Continue to monitor BP         Left knee pain     Start Tylenol tid          Medications, treatments, and labs reviewed  Continue medications and treatments as listed in EMR    Scribe Attestation  IJanie Scribe   attest that this documentation has been prepared under the direction and in the presence of JANE Lorenzana-CNP    Provider Attestation - Scribe documentation  All medical record entries made by the Scribe were at my direction and personally dictated by me. I have reviewed the chart and agree that the record  accurately reflects my personal performance of the history, physical exam, discussion and plan.   Gi Piña, APRN-CNP

## 2023-10-09 NOTE — ASSESSMENT & PLAN NOTE
Uncontrolled  Increase Norvasc to 5 mg every day  Continue Carvedilol and Cozaar  Continue to monitor BP

## 2023-10-10 ENCOUNTER — NURSING HOME VISIT (OUTPATIENT)
Dept: POST ACUTE CARE | Facility: EXTERNAL LOCATION | Age: 88
End: 2023-10-10
Payer: MEDICARE

## 2023-10-10 DIAGNOSIS — I10 HYPERTENSION, ESSENTIAL: ICD-10-CM

## 2023-10-10 DIAGNOSIS — R19.8 DIFFICULTY SWALLOWING PILLS: Primary | ICD-10-CM

## 2023-10-10 PROCEDURE — 99308 SBSQ NF CARE LOW MDM 20: CPT | Performed by: NURSE PRACTITIONER

## 2023-10-10 NOTE — LETTER
Patient: Ruth Wheatley  : 10/30/1929    Encounter Date: 10/10/2023    PROGRESS NOTE    Subjective  Chief complaint: Ruth Wheatley is a 89 y.o. female who is a long term care patient being seen and evaluated for trouble swallowing pills.    HPI:  HPI   Patient presents with complaint of trouble swallowing Tylenol and requesting caplet.  No further concerns at this time.  Patient denies n/v/f/c and sob.  Objective  Vital signs:   18, 142/73, 97.8, 97, 97%  Physical Exam  Constitutional:       General: She is not in acute distress.  Eyes:      Extraocular Movements: Extraocular movements intact.   Cardiovascular:      Rate and Rhythm: Normal rate and regular rhythm.   Pulmonary:      Effort: Pulmonary effort is normal.      Breath sounds: Normal breath sounds.   Musculoskeletal:      Cervical back: Neck supple.      Right lower leg: No edema.      Left lower leg: No edema.   Neurological:      Mental Status: She is alert.   Psychiatric:         Mood and Affect: Mood normal.         Behavior: Behavior is cooperative.         Assessment/Plan  Problem List Items Addressed This Visit       Difficulty swallowing pills - Primary     Facility unable to obtain caplet as its not available from pharmacy, may crush tablets         Hypertension, essential     Continue to monitor          Medications, treatments, and labs reviewed  Continue medications and treatments as listed in EMR    Scribe Attestation  IJanie Scribe   attest that this documentation has been prepared under the direction and in the presence of ISAURO Lorenzana    Provider Attestation - Scribe documentation  All medical record entries made by the Scribe were at my direction and personally dictated by me. I have reviewed the chart and agree that the record accurately reflects my personal performance of the history, physical exam, discussion and plan.   ISAURO Lorenzana        Electronically Signed By: Gi Piña  JANE-CNP   10/18/23  3:11 PM

## 2023-10-11 ENCOUNTER — NURSING HOME VISIT (OUTPATIENT)
Dept: POST ACUTE CARE | Facility: EXTERNAL LOCATION | Age: 88
End: 2023-10-11
Payer: MEDICARE

## 2023-10-11 DIAGNOSIS — F32.A DEPRESSION, UNSPECIFIED DEPRESSION TYPE: ICD-10-CM

## 2023-10-11 DIAGNOSIS — J43.9 PULMONARY EMPHYSEMA, UNSPECIFIED EMPHYSEMA TYPE (MULTI): Primary | ICD-10-CM

## 2023-10-11 DIAGNOSIS — I10 PRIMARY HYPERTENSION: ICD-10-CM

## 2023-10-11 DIAGNOSIS — R53.1 WEAKNESS: ICD-10-CM

## 2023-10-11 PROBLEM — M79.606 LEG PAIN: Status: RESOLVED | Noted: 2023-08-22 | Resolved: 2023-10-11

## 2023-10-11 PROBLEM — J44.9 COPD WITHOUT EXACERBATION (MULTI): Status: ACTIVE | Noted: 2023-04-11

## 2023-10-11 PROBLEM — J18.9 PNA (PNEUMONIA): Status: RESOLVED | Noted: 2023-08-22 | Resolved: 2023-10-11

## 2023-10-11 PROBLEM — R09.02 HYPOXIA: Status: RESOLVED | Noted: 2023-08-22 | Resolved: 2023-10-11

## 2023-10-11 PROBLEM — U07.1 COVID: Status: RESOLVED | Noted: 2023-07-28 | Resolved: 2023-10-11

## 2023-10-11 PROBLEM — M79.89 RIGHT LEG SWELLING: Status: RESOLVED | Noted: 2023-08-24 | Resolved: 2023-10-11

## 2023-10-11 PROCEDURE — 99308 SBSQ NF CARE LOW MDM 20: CPT | Performed by: INTERNAL MEDICINE

## 2023-10-11 NOTE — PROGRESS NOTES
PROGRESS NOTE    Subjective   Chief complaint: Ruth Wheatley is a 89 y.o. female who is a long term care patient being seen and evaluated for   Weakness    HPI:   patient has no new complaints or concerns today.  Patient is working in therapy.    Patient requires assistance for transfers ADLs and mobility.   Denies chest pain or headache.  Mood has been stable, denies feeling down. Denies SOB or orthopnea. No acute distress.      Objective   Vital signs:  128/74, 98%    Physical Exam  Constitutional:       General: She is not in acute distress.  Eyes:      Extraocular Movements: Extraocular movements intact.   Cardiovascular:      Rate and Rhythm: Normal rate and regular rhythm.   Pulmonary:      Effort: Pulmonary effort is normal.      Breath sounds: Normal breath sounds.   Abdominal:      General: Bowel sounds are normal.      Palpations: Abdomen is soft.   Musculoskeletal:      Cervical back: Neck supple.      Right lower leg: No edema.      Left lower leg: No edema.   Neurological:      Mental Status: She is alert.   Psychiatric:         Mood and Affect: Mood normal.         Behavior: Behavior is cooperative.         Assessment/Plan   Problem List Items Addressed This Visit       Emphysema lung (CMS/[facility]) - Primary      Stable  Monitor for new or worsening symptoms         Depression     Mood stable  Continue antihypertensives         Primary hypertension     Continue  Norvasc   Continue Carvedilol and Cozaar  Continue to monitor BP         Weakness       therapy        Medications, treatments, and labs reviewed  Continue medications and treatments as listed in [facility]    Scribe Attestation  By signing my name below, I, Leslye Yost   attest that this documentation has been prepared under the direction and in the presence of Corrie Desouza MD.    Provider Attestation - Scribe documentation  All medical record entries made by the Scribe were at my direction and personally dictated by me. I have reviewed  the chart and agree that the record accurately reflects my personal performance of the history, physical exam, discussion and plan.    1. Pulmonary emphysema, unspecified emphysema type (CMS/HCC)        2. Depression, unspecified depression type        3. Weakness        4. Primary hypertension

## 2023-10-11 NOTE — LETTER
Patient: Ruth Wheatley  : 10/30/1929    Encounter Date: 10/11/2023    PROGRESS NOTE    Subjective  Chief complaint: Ruth Wheatley is a 89 y.o. female who is a long term care patient being seen and evaluated for   Weakness    HPI:   patient has no new complaints or concerns today.  Patient is working in therapy.    Patient requires assistance for transfers ADLs and mobility.   Denies chest pain or headache.  Mood has been stable, denies feeling down. Denies SOB or orthopnea. No acute distress.      Objective  Vital signs:  128/74, 98%    Physical Exam  Constitutional:       General: She is not in acute distress.  Eyes:      Extraocular Movements: Extraocular movements intact.   Cardiovascular:      Rate and Rhythm: Normal rate and regular rhythm.   Pulmonary:      Effort: Pulmonary effort is normal.      Breath sounds: Normal breath sounds.   Abdominal:      General: Bowel sounds are normal.      Palpations: Abdomen is soft.   Musculoskeletal:      Cervical back: Neck supple.      Right lower leg: No edema.      Left lower leg: No edema.   Neurological:      Mental Status: She is alert.   Psychiatric:         Mood and Affect: Mood normal.         Behavior: Behavior is cooperative.         Assessment/Plan  Problem List Items Addressed This Visit       Emphysema lung (CMS/HCC) - Primary      Stable  Monitor for new or worsening symptoms         Depression     Mood stable  Continue antihypertensives         Primary hypertension     Continue  Norvasc   Continue Carvedilol and Cozaar  Continue to monitor BP         Weakness       therapy        Medications, treatments, and labs reviewed  Continue medications and treatments as listed in [facility]    Scribe Attestation  By signing my name below, ITahmina, Brooklynnibdesean   attest that this documentation has been prepared under the direction and in the presence of Corrie Desouza MD.    Provider Attestation - Scribe documentation  All medical record entries made by the  Scribe were at my direction and personally dictated by me. I have reviewed the chart and agree that the record accurately reflects my personal performance of the history, physical exam, discussion and plan.    1. Pulmonary emphysema, unspecified emphysema type (CMS/HCC)        2. Depression, unspecified depression type        3. Weakness        4. Primary hypertension               Electronically Signed By: Corrie Desouza MD   10/11/23  4:54 PM

## 2023-10-13 ENCOUNTER — NURSING HOME VISIT (OUTPATIENT)
Dept: POST ACUTE CARE | Facility: EXTERNAL LOCATION | Age: 88
End: 2023-10-13
Payer: MEDICARE

## 2023-10-13 DIAGNOSIS — I10 HYPERTENSION, ESSENTIAL: Primary | ICD-10-CM

## 2023-10-13 DIAGNOSIS — J44.9 COPD WITHOUT EXACERBATION (MULTI): ICD-10-CM

## 2023-10-13 PROCEDURE — 99308 SBSQ NF CARE LOW MDM 20: CPT | Performed by: INTERNAL MEDICINE

## 2023-10-13 NOTE — PROGRESS NOTES
PROGRESS NOTE    Subjective   Chief complaint: Ruth Wheatley is a 89 y.o. female who is a long term care patient being seen and evaluated for trouble swallowing pills.    HPI:  HPI   Patient presents with complaint of trouble swallowing Tylenol and requesting caplet.  No further concerns at this time.  Patient denies n/v/f/c and sob.  Objective   Vital signs:   18, 142/73, 97.8, 97, 97%  Physical Exam  Constitutional:       General: She is not in acute distress.  Eyes:      Extraocular Movements: Extraocular movements intact.   Cardiovascular:      Rate and Rhythm: Normal rate and regular rhythm.   Pulmonary:      Effort: Pulmonary effort is normal.      Breath sounds: Normal breath sounds.   Musculoskeletal:      Cervical back: Neck supple.      Right lower leg: No edema.      Left lower leg: No edema.   Neurological:      Mental Status: She is alert.   Psychiatric:         Mood and Affect: Mood normal.         Behavior: Behavior is cooperative.         Assessment/Plan   Problem List Items Addressed This Visit       Difficulty swallowing pills - Primary     Facility unable to obtain caplet as its not available from pharmacy, may crush tablets         Hypertension, essential     Continue to monitor          Medications, treatments, and labs reviewed  Continue medications and treatments as listed in EMR    Scribe Attestation  Janie GUSTAFSON Scribe   attest that this documentation has been prepared under the direction and in the presence of ISAURO Lorenzana    Provider Attestation - Scribe documentation  All medical record entries made by the Scribe were at my direction and personally dictated by me. I have reviewed the chart and agree that the record accurately reflects my personal performance of the history, physical exam, discussion and plan.   ISAURO Lorenzana

## 2023-10-13 NOTE — PROGRESS NOTES
PROGRESS NOTE    Subjective   Chief complaint: Ruth Wheatley is a 89 y.o. female who is a long term care patient being seen and evaluated for HTN    HPI:  Patient had recent increase of her Norvasc. Her B is improved. Denies chest pain or headache. No other concerns today. Denies SOB or cough. No acute distress.       Objective   Vital signs: 129/76, 98%    Physical Exam  Constitutional:       General: She is not in acute distress.  Eyes:      Extraocular Movements: Extraocular movements intact.   Cardiovascular:      Rate and Rhythm: Normal rate and regular rhythm.   Pulmonary:      Effort: Pulmonary effort is normal.      Breath sounds: Normal breath sounds.   Abdominal:      General: Bowel sounds are normal.      Palpations: Abdomen is soft.   Musculoskeletal:      Cervical back: Neck supple.      Right lower leg: No edema.      Left lower leg: No edema.   Neurological:      Mental Status: She is alert.   Psychiatric:         Mood and Affect: Mood normal.         Behavior: Behavior is cooperative.         Assessment/Plan   Problem List Items Addressed This Visit       COPD without exacerbation (CMS/[facility])     Stable  Continue Breo inhaler  Aerosol treatment         Hypertension, essential - Primary     Continue  Norvasc   Continue Carvedilol and Cozaar  Continue to monitor BP          Medications, treatments, and labs reviewed  Continue medications and treatments as listed in [facility]    Scribe Attestation  By signing my name below, I, Leslye Yost   attest that this documentation has been prepared under the direction and in the presence of Corrie Desouza MD.    Provider Attestation - Scribe documentation  All medical record entries made by the Scribe were at my direction and personally dictated by me. I have reviewed the chart and agree that the record accurately reflects my personal performance of the history, physical exam, discussion and plan.    1. Hypertension, essential        2. COPD without  exacerbation (CMS/HCC)

## 2023-10-13 NOTE — LETTER
Patient: Ruth Wheatley  : 10/30/1929    Encounter Date: 10/13/2023    PROGRESS NOTE    Subjective  Chief complaint: Ruth Wheatley is a 89 y.o. female who is a long term care patient being seen and evaluated for HTN    HPI:  Patient had recent increase of her Norvasc. Her B is improved. Denies chest pain or headache. No other concerns today. Denies SOB or cough. No acute distress.       Objective  Vital signs: 129/76, 98%    Physical Exam  Constitutional:       General: She is not in acute distress.  Eyes:      Extraocular Movements: Extraocular movements intact.   Cardiovascular:      Rate and Rhythm: Normal rate and regular rhythm.   Pulmonary:      Effort: Pulmonary effort is normal.      Breath sounds: Normal breath sounds.   Abdominal:      General: Bowel sounds are normal.      Palpations: Abdomen is soft.   Musculoskeletal:      Cervical back: Neck supple.      Right lower leg: No edema.      Left lower leg: No edema.   Neurological:      Mental Status: She is alert.   Psychiatric:         Mood and Affect: Mood normal.         Behavior: Behavior is cooperative.         Assessment/Plan  Problem List Items Addressed This Visit       COPD without exacerbation (CMS/[facility])     Stable  Continue Breo inhaler  Aerosol treatment         Hypertension, essential - Primary     Continue  Norvasc   Continue Carvedilol and Cozaar  Continue to monitor BP          Medications, treatments, and labs reviewed  Continue medications and treatments as listed in [facility]    Scribe Attestation  By signing my name below, I, Leslye Yost   attest that this documentation has been prepared under the direction and in the presence of Corrie Desouza MD.    Provider Attestation - Scribe documentation  All medical record entries made by the Scribe were at my direction and personally dictated by me. I have reviewed the chart and agree that the record accurately reflects my personal performance of the history, physical exam, discussion  and plan.    1. Hypertension, essential        2. COPD without exacerbation (CMS/[facility])               Electronically Signed By: Corrie Desouza MD   10/15/23  8:00 AM

## 2023-10-18 ENCOUNTER — NURSING HOME VISIT (OUTPATIENT)
Dept: POST ACUTE CARE | Facility: EXTERNAL LOCATION | Age: 88
End: 2023-10-18
Payer: MEDICARE

## 2023-10-18 DIAGNOSIS — F32.A DEPRESSION, UNSPECIFIED DEPRESSION TYPE: ICD-10-CM

## 2023-10-18 DIAGNOSIS — R53.1 WEAKNESS: ICD-10-CM

## 2023-10-18 DIAGNOSIS — J44.9 COPD WITHOUT EXACERBATION (MULTI): ICD-10-CM

## 2023-10-18 DIAGNOSIS — I10 HYPERTENSION, ESSENTIAL: ICD-10-CM

## 2023-10-18 PROBLEM — R19.8 DIFFICULTY SWALLOWING PILLS: Status: ACTIVE | Noted: 2023-10-18

## 2023-10-18 PROCEDURE — 99308 SBSQ NF CARE LOW MDM 20: CPT | Performed by: INTERNAL MEDICINE

## 2023-10-18 NOTE — PROGRESS NOTES
PROGRESS NOTE    Subjective   Chief complaint: Ruth Wheatley is a 89 y.o. female who is a long term care patient being seen and evaluated for weakness.     HPI:  Therapy has been working with the patient to improve strength and endurance with ADLs, transfers, and mobility.  Patient continues to work toward goals. Patient actively participates in skilled interventions.  Patient is stable and has no new complaints.  Nursing staff voices no new concerns today.      Objective   Vital signs: 169/77,100,96%    Physical Exam  Constitutional:       General: She is not in acute distress.  Eyes:      Extraocular Movements: Extraocular movements intact.   Cardiovascular:      Rate and Rhythm: Normal rate and regular rhythm.   Pulmonary:      Effort: Pulmonary effort is normal.      Breath sounds: Normal breath sounds.   Abdominal:      General: Bowel sounds are normal.      Palpations: Abdomen is soft.   Musculoskeletal:      Cervical back: Neck supple.      Right lower leg: No edema.      Left lower leg: No edema.   Neurological:      Mental Status: She is alert.   Psychiatric:         Mood and Affect: Mood normal.         Behavior: Behavior is cooperative.         Assessment/Plan   Problem List Items Addressed This Visit       COPD without exacerbation (CMS/[facility])     Stable  Continue Breo inhaler  Aerosol treatment         Depression     Mood stable  Continue antihypertensives         Hypertension, essential     Continue  Norvasc   Continue Carvedilol and Cozaar  Continue to monitor BP         Weakness     Continue therapy          Medications, treatments, and labs reviewed  Continue medications and treatments as listed in EMR      Scribe Attestation  I, Leslye Colon   attest that this documentation has been prepared under the direction and in the presence of Corrie Desouza MD.     Provider Attestation - Scribe documentation  All medical record entries made by the Scribe were at my direction and personally  dictated by me. I have reviewed the chart and agree that the record accurately reflects my personal performance of the history, physical exam, discussion and plan.   Corrie Desouza MD

## 2023-10-18 NOTE — LETTER
Patient: Ruth Wheatley  : 10/30/1929    Encounter Date: 10/18/2023    PROGRESS NOTE    Subjective  Chief complaint: Ruth Wheatley is a 89 y.o. female who is a long term care patient being seen and evaluated for weakness.     HPI:  Therapy has been working with the patient to improve strength and endurance with ADLs, transfers, and mobility.  Patient continues to work toward goals. Patient actively participates in skilled interventions.  Patient is stable and has no new complaints.  Nursing staff voices no new concerns today.      Objective  Vital signs: 169/77,100,96%    Physical Exam  Constitutional:       General: She is not in acute distress.  Eyes:      Extraocular Movements: Extraocular movements intact.   Cardiovascular:      Rate and Rhythm: Normal rate and regular rhythm.   Pulmonary:      Effort: Pulmonary effort is normal.      Breath sounds: Normal breath sounds.   Abdominal:      General: Bowel sounds are normal.      Palpations: Abdomen is soft.   Musculoskeletal:      Cervical back: Neck supple.      Right lower leg: No edema.      Left lower leg: No edema.   Neurological:      Mental Status: She is alert.   Psychiatric:         Mood and Affect: Mood normal.         Behavior: Behavior is cooperative.         Assessment/Plan  Problem List Items Addressed This Visit       COPD without exacerbation (CMS/[facility])     Stable  Continue Breo inhaler  Aerosol treatment         Depression     Mood stable  Continue antihypertensives         Hypertension, essential     Continue  Norvasc   Continue Carvedilol and Cozaar  Continue to monitor BP         Weakness     Continue therapy          Medications, treatments, and labs reviewed  Continue medications and treatments as listed in EMR      Scribe Attestation  JANAY, Leslye Colon   attest that this documentation has been prepared under the direction and in the presence of Corrie Desouza MD.     Provider Attestation - Scribe documentation  All medical  record entries made by the Scribe were at my direction and personally dictated by me. I have reviewed the chart and agree that the record accurately reflects my personal performance of the history, physical exam, discussion and plan.   Corrie Desouza MD      Electronically Signed By: Corrie Desouza MD   10/18/23  5:06 PM

## 2023-10-25 ENCOUNTER — NURSING HOME VISIT (OUTPATIENT)
Dept: POST ACUTE CARE | Facility: EXTERNAL LOCATION | Age: 88
End: 2023-10-25
Payer: MEDICARE

## 2023-10-25 DIAGNOSIS — I10 HYPERTENSION, ESSENTIAL: ICD-10-CM

## 2023-10-25 DIAGNOSIS — J44.9 COPD WITHOUT EXACERBATION (MULTI): ICD-10-CM

## 2023-10-25 DIAGNOSIS — R53.1 WEAKNESS: ICD-10-CM

## 2023-10-25 PROCEDURE — 99308 SBSQ NF CARE LOW MDM 20: CPT | Performed by: INTERNAL MEDICINE

## 2023-10-25 NOTE — PROGRESS NOTES
PROGRESS NOTE    Subjective   Chief complaint: Ruth Wheatley is a 89 y.o. female who is a long term care patient being seen and evaluated for weakness.     HPI:  Therapy has been working with the patient to improve strength and endurance with ADLs, transfers, and mobility.  Patient continues to work toward goals. Patient working with ST to improve cognition\memory recall and speech skills.  Patient is stable and has no new complaints.  Nursing staff voices no new concerns today.      Objective   Vital signs: 169/77,100,96%    Physical Exam  Constitutional:       General: She is not in acute distress.  Eyes:      Extraocular Movements: Extraocular movements intact.   Cardiovascular:      Rate and Rhythm: Normal rate and regular rhythm.   Pulmonary:      Effort: Pulmonary effort is normal.      Breath sounds: Normal breath sounds.   Abdominal:      General: Bowel sounds are normal.      Palpations: Abdomen is soft.   Musculoskeletal:      Cervical back: Neck supple.      Right lower leg: No edema.      Left lower leg: No edema.   Neurological:      Mental Status: She is alert.   Psychiatric:         Mood and Affect: Mood normal.         Behavior: Behavior is cooperative.         Assessment/Plan   Problem List Items Addressed This Visit       COPD without exacerbation (CMS/[facility])     Stable  Continue Breo inhaler  Aerosol treatment         Hypertension, essential     Continue  Norvasc   Continue Carvedilol and Cozaar  Continue to monitor BP         Weakness     Continue therapy          Medications, treatments, and labs reviewed  Continue medications and treatments as listed in EMR      Scribe Attestation  Helen GUSTAFSON Scribe   attest that this documentation has been prepared under the direction and in the presence of Corrie Desouza MD.     Provider Attestation - Scribe documentation  All medical record entries made by the Scribe were at my direction and personally dictated by me. I have reviewed the chart and  agree that the record accurately reflects my personal performance of the history, physical exam, discussion and plan.   Corrie Desouza MD

## 2023-10-25 NOTE — LETTER
Patient: Ruth Wheatley  : 10/30/1929    Encounter Date: 10/25/2023    PROGRESS NOTE    Subjective  Chief complaint: Ruth Wheatley is a 89 y.o. female who is a long term care patient being seen and evaluated for weakness.     HPI:  Therapy has been working with the patient to improve strength and endurance with ADLs, transfers, and mobility.  Patient continues to work toward goals. Patient working with ST to improve cognition\memory recall and speech skills.  Patient is stable and has no new complaints.  Nursing staff voices no new concerns today.      Objective  Vital signs: 169/77,100,96%    Physical Exam  Constitutional:       General: She is not in acute distress.  Eyes:      Extraocular Movements: Extraocular movements intact.   Cardiovascular:      Rate and Rhythm: Normal rate and regular rhythm.   Pulmonary:      Effort: Pulmonary effort is normal.      Breath sounds: Normal breath sounds.   Abdominal:      General: Bowel sounds are normal.      Palpations: Abdomen is soft.   Musculoskeletal:      Cervical back: Neck supple.      Right lower leg: No edema.      Left lower leg: No edema.   Neurological:      Mental Status: She is alert.   Psychiatric:         Mood and Affect: Mood normal.         Behavior: Behavior is cooperative.         Assessment/Plan  Problem List Items Addressed This Visit       COPD without exacerbation (CMS/[facility])     Stable  Continue Breo inhaler  Aerosol treatment         Hypertension, essential     Continue  Norvasc   Continue Carvedilol and Cozaar  Continue to monitor BP         Weakness     Continue therapy          Medications, treatments, and labs reviewed  Continue medications and treatments as listed in EMR      Scribe Attestation  I, Leslye Colon   attest that this documentation has been prepared under the direction and in the presence of Corrie Desouza MD.     Provider Attestation - Scribe documentation  All medical record entries made by the Scribe were at  my direction and personally dictated by me. I have reviewed the chart and agree that the record accurately reflects my personal performance of the history, physical exam, discussion and plan.   Corrie Desouza MD      Electronically Signed By: Corrie Desouza MD   10/25/23  5:40 PM

## 2023-10-27 ENCOUNTER — NURSING HOME VISIT (OUTPATIENT)
Dept: POST ACUTE CARE | Facility: EXTERNAL LOCATION | Age: 88
End: 2023-10-27
Payer: MEDICARE

## 2023-10-27 DIAGNOSIS — J44.9 COPD WITHOUT EXACERBATION (MULTI): ICD-10-CM

## 2023-10-27 DIAGNOSIS — I10 HYPERTENSION, ESSENTIAL: Primary | ICD-10-CM

## 2023-10-27 PROCEDURE — 99308 SBSQ NF CARE LOW MDM 20: CPT | Performed by: INTERNAL MEDICINE

## 2023-10-27 NOTE — LETTER
Patient: Ruth Wheatley  : 10/30/1929    Encounter Date: 10/27/2023    PROGRESS NOTE    Subjective  Chief complaint: Ruth Wheatley is a 89 y.o. female who is a long term care patient being seen and evaluated for HTN    HPI:  Patient with HTN was recently started on Norvasc for elevated BP which is improved. Her SBP is now stable in the 130-140's. Denies chest pain or headache. Denies SOB or wheezing. No acute distress.       Objective  Vital signs: 132/76, 98%    Physical Exam  Constitutional:       General: She is not in acute distress.  Eyes:      Extraocular Movements: Extraocular movements intact.   Cardiovascular:      Rate and Rhythm: Normal rate and regular rhythm.   Pulmonary:      Effort: Pulmonary effort is normal.      Breath sounds: Normal breath sounds.   Abdominal:      General: Bowel sounds are normal.      Palpations: Abdomen is soft.   Musculoskeletal:      Cervical back: Neck supple.      Right lower leg: No edema.      Left lower leg: No edema.   Neurological:      Mental Status: She is alert.   Psychiatric:         Mood and Affect: Mood normal.         Behavior: Behavior is cooperative.         Assessment/Plan  Problem List Items Addressed This Visit       COPD without exacerbation (CMS/[facility])     Stable  Continue Breo inhaler  Aerosol treatment         Hypertension, essential - Primary     Continue  Norvasc   Continue Carvedilol and Cozaar  Continue to monitor BP          Medications, treatments, and labs reviewed  Continue medications and treatments as listed in PCC    Scribe Attestation  By signing my name below, ITahmina Scribe   attest that this documentation has been prepared under the direction and in the presence of Corrie Desouza MD.    Provider Attestation - Scribe documentation  All medical record entries made by the Scribe were at my direction and personally dictated by me. I have reviewed the chart and agree that the record accurately reflects my personal performance of  the history, physical exam, discussion and plan.    1. Hypertension, essential        2. COPD without exacerbation (CMS/[facility])               Electronically Signed By: Corrie Desouza MD   10/30/23  8:25 PM

## 2023-10-30 NOTE — PROGRESS NOTES
PROGRESS NOTE    Subjective   Chief complaint: Ruth Wheatley is a 89 y.o. female who is a long term care patient being seen and evaluated for HTN    HPI:  Patient with HTN was recently started on Norvasc for elevated BP which is improved. Her SBP is now stable in the 130-140's. Denies chest pain or headache. Denies SOB or wheezing. No acute distress.       Objective   Vital signs: 132/76, 98%    Physical Exam  Constitutional:       General: She is not in acute distress.  Eyes:      Extraocular Movements: Extraocular movements intact.   Cardiovascular:      Rate and Rhythm: Normal rate and regular rhythm.   Pulmonary:      Effort: Pulmonary effort is normal.      Breath sounds: Normal breath sounds.   Abdominal:      General: Bowel sounds are normal.      Palpations: Abdomen is soft.   Musculoskeletal:      Cervical back: Neck supple.      Right lower leg: No edema.      Left lower leg: No edema.   Neurological:      Mental Status: She is alert.   Psychiatric:         Mood and Affect: Mood normal.         Behavior: Behavior is cooperative.         Assessment/Plan   Problem List Items Addressed This Visit       COPD without exacerbation (CMS/[facility])     Stable  Continue Breo inhaler  Aerosol treatment         Hypertension, essential - Primary     Continue  Norvasc   Continue Carvedilol and Cozaar  Continue to monitor BP          Medications, treatments, and labs reviewed  Continue medications and treatments as listed in PCC    Scribe Attestation  By signing my name below, I, Leslye Yost   attest that this documentation has been prepared under the direction and in the presence of Corrie Desouza MD.    Provider Attestation - Scribe documentation  All medical record entries made by the Scribe were at my direction and personally dictated by me. I have reviewed the chart and agree that the record accurately reflects my personal performance of the history, physical exam, discussion and plan.    1. Hypertension,  essential        2. COPD without exacerbation (CMS/HCC)

## 2023-10-31 ENCOUNTER — NURSING HOME VISIT (OUTPATIENT)
Dept: POST ACUTE CARE | Facility: EXTERNAL LOCATION | Age: 88
End: 2023-10-31
Payer: MEDICARE

## 2023-10-31 DIAGNOSIS — I10 HYPERTENSION, ESSENTIAL: ICD-10-CM

## 2023-10-31 DIAGNOSIS — M79.605 BILATERAL LEG PAIN: Primary | ICD-10-CM

## 2023-10-31 DIAGNOSIS — M79.604 BILATERAL LEG PAIN: Primary | ICD-10-CM

## 2023-10-31 DIAGNOSIS — J44.9 COPD WITHOUT EXACERBATION (MULTI): ICD-10-CM

## 2023-10-31 PROCEDURE — 99309 SBSQ NF CARE MODERATE MDM 30: CPT | Performed by: NURSE PRACTITIONER

## 2023-10-31 NOTE — LETTER
Patient: Ruth Wheatley  : 10/30/1929    Encounter Date: 10/31/2023    PROGRESS NOTE    Subjective  Chief complaint: Ruth Wheatley is a 89 y.o. female who is a long term care patient being seen and evaluated for follow-up of HTN and leg pain.    HPI:  HPI  Patient being seen for follow-up of hypertension.  Norvasc was increased on 10/5.  In addition patient has complaint of bilateral leg pain which is not new.  Tylenol is somewhat effective however pain has been waking patient up at night.  Patient has no further complaints or concerns.  Denies n/v/f/c.    Objective  Vital signs:   18, 169/77, 98.2, 100, 96%  Physical Exam  Constitutional:       General: She is not in acute distress.  Eyes:      Extraocular Movements: Extraocular movements intact.   Cardiovascular:      Rate and Rhythm: Normal rate and regular rhythm.   Pulmonary:      Effort: Pulmonary effort is normal.      Breath sounds: Normal breath sounds.   Musculoskeletal:      Cervical back: Neck supple.      Right lower leg: Edema present.      Left lower leg: Edema present.      Comments: BLE - 0 redness or discoloration, tender to palpation   Neurological:      Mental Status: She is alert.   Psychiatric:         Mood and Affect: Mood normal.         Behavior: Behavior is cooperative.         Assessment/Plan  Problem List Items Addressed This Visit       COPD without exacerbation (CMS/[facility])     Stable  Continue Breo inhaler  Aerosol treatment         Hypertension, essential     Continue  Norvasc   Continue Carvedilol and Cozaar  Monitor BP         Bilateral leg pain - Primary     Start muscle rub every hs  Change Tylenol to 1000 mg tid            Medications, treatments, and labs reviewed  Continue medications and treatments as listed in EMR    Scribe Attestation  I, Leslye Sifuentes   attest that this documentation has been prepared under the direction and in the presence of JANE Lorenzana-CNP    Provider Attestation - Leslye  documentation  All medical record entries made by the Scribe were at my direction and personally dictated by me. I have reviewed the chart and agree that the record accurately reflects my personal performance of the history, physical exam, discussion and plan.   ISAURO Lorenzana        Electronically Signed By: ISAURO Lorenzana   11/8/23 12:00 PM

## 2023-11-01 ENCOUNTER — NURSING HOME VISIT (OUTPATIENT)
Dept: POST ACUTE CARE | Facility: EXTERNAL LOCATION | Age: 88
End: 2023-11-01
Payer: MEDICARE

## 2023-11-01 DIAGNOSIS — M25.562 CHRONIC PAIN OF LEFT KNEE: ICD-10-CM

## 2023-11-01 DIAGNOSIS — G89.29 CHRONIC PAIN OF LEFT KNEE: ICD-10-CM

## 2023-11-01 DIAGNOSIS — R53.1 WEAKNESS: Primary | ICD-10-CM

## 2023-11-01 DIAGNOSIS — I10 HYPERTENSION, ESSENTIAL: ICD-10-CM

## 2023-11-01 DIAGNOSIS — J44.9 COPD WITHOUT EXACERBATION (MULTI): ICD-10-CM

## 2023-11-01 PROCEDURE — 99308 SBSQ NF CARE LOW MDM 20: CPT | Performed by: INTERNAL MEDICINE

## 2023-11-01 NOTE — LETTER
Patient: Ruth Wheatley  : 10/30/1929    Encounter Date: 2023    PROGRESS NOTE    Subjective  Chief complaint: Ruth Wheatley is a 89 y.o. female who is a long term care patient being seen and evaluated for weakness, leg pain    HPI:  Patient working in therapy due to weakness and debility. Patient requires assistance for transfers, ADLs and mobility. Patient also had c/o leg pain. Denies trauma or fall. Denies SOB or cough. Denies chest pain or headache. No acute distress.       Objective  Vital signs: 123/56, 98%    Physical Exam  Constitutional:       General: She is not in acute distress.  Eyes:      Extraocular Movements: Extraocular movements intact.   Cardiovascular:      Rate and Rhythm: Normal rate and regular rhythm.   Pulmonary:      Effort: Pulmonary effort is normal.      Breath sounds: Normal breath sounds.   Abdominal:      General: Bowel sounds are normal.      Palpations: Abdomen is soft.   Musculoskeletal:      Cervical back: Neck supple.      Right lower leg: No edema.      Left lower leg: No edema.   Neurological:      Mental Status: She is alert.   Psychiatric:         Mood and Affect: Mood normal.         Behavior: Behavior is cooperative.         Assessment/Plan  Problem List Items Addressed This Visit       COPD without exacerbation (CMS/[facility])     Stable  Continue Breo inhaler  Aerosol treatment         Hypertension, essential     Continue  Norvasc   Continue Carvedilol and Cozaar  Continue to monitor BP         Weakness - Primary     Continue therapy         Left knee pain     Tylenol tid  Muscle rub          Medications, treatments, and labs reviewed  Continue medications and treatments as listed in PCC    Scribe Attestation  By signing my name below, Tahmina GUSTAFSON Scribe   attest that this documentation has been prepared under the direction and in the presence of Corrie Desouza MD.    Provider Attestation - Scribe documentation  All medical record entries made by the Scribe  were at my direction and personally dictated by me. I have reviewed the chart and agree that the record accurately reflects my personal performance of the history, physical exam, discussion and plan.    1. Weakness        2. Hypertension, essential        3. COPD without exacerbation (CMS/[facility])        4. Chronic pain of left knee               Electronically Signed By: Corrie Desouza MD   11/6/23 11:02 AM

## 2023-11-02 NOTE — PROGRESS NOTES
PROGRESS NOTE    Subjective   Chief complaint: Ruth Wheatley is a 89 y.o. female who is a long term care patient being seen and evaluated for weakness, leg pain    HPI:  Patient working in therapy due to weakness and debility. Patient requires assistance for transfers, ADLs and mobility. Patient also had c/o leg pain. Denies trauma or fall. Denies SOB or cough. Denies chest pain or headache. No acute distress.       Objective   Vital signs: 123/56, 98%    Physical Exam  Constitutional:       General: She is not in acute distress.  Eyes:      Extraocular Movements: Extraocular movements intact.   Cardiovascular:      Rate and Rhythm: Normal rate and regular rhythm.   Pulmonary:      Effort: Pulmonary effort is normal.      Breath sounds: Normal breath sounds.   Abdominal:      General: Bowel sounds are normal.      Palpations: Abdomen is soft.   Musculoskeletal:      Cervical back: Neck supple.      Right lower leg: No edema.      Left lower leg: No edema.   Neurological:      Mental Status: She is alert.   Psychiatric:         Mood and Affect: Mood normal.         Behavior: Behavior is cooperative.         Assessment/Plan   Problem List Items Addressed This Visit       COPD without exacerbation (CMS/[facility])     Stable  Continue Breo inhaler  Aerosol treatment         Hypertension, essential     Continue  Norvasc   Continue Carvedilol and Cozaar  Continue to monitor BP         Weakness - Primary     Continue therapy         Left knee pain     Tylenol tid  Muscle rub          Medications, treatments, and labs reviewed  Continue medications and treatments as listed in PCC    Scribe Attestation  By signing my name below, ITahmina Scribe   attest that this documentation has been prepared under the direction and in the presence of Corrie Desouza MD.    Provider Attestation - Scribe documentation  All medical record entries made by the Scribe were at my direction and personally dictated by me. I have reviewed the  chart and agree that the record accurately reflects my personal performance of the history, physical exam, discussion and plan.    1. Weakness        2. Hypertension, essential        3. COPD without exacerbation (CMS/[facility])        4. Chronic pain of left knee

## 2023-11-03 ENCOUNTER — NURSING HOME VISIT (OUTPATIENT)
Dept: POST ACUTE CARE | Facility: EXTERNAL LOCATION | Age: 88
End: 2023-11-03
Payer: MEDICARE

## 2023-11-03 DIAGNOSIS — G89.29 CHRONIC PAIN OF LEFT KNEE: ICD-10-CM

## 2023-11-03 DIAGNOSIS — J44.9 COPD WITHOUT EXACERBATION (MULTI): ICD-10-CM

## 2023-11-03 DIAGNOSIS — R53.1 WEAKNESS: Primary | ICD-10-CM

## 2023-11-03 DIAGNOSIS — M25.562 CHRONIC PAIN OF LEFT KNEE: ICD-10-CM

## 2023-11-03 DIAGNOSIS — I10 HYPERTENSION, ESSENTIAL: ICD-10-CM

## 2023-11-03 PROCEDURE — 99308 SBSQ NF CARE LOW MDM 20: CPT | Performed by: INTERNAL MEDICINE

## 2023-11-03 NOTE — LETTER
Patient: Ruth Wheatley  : 10/30/1929    Encounter Date: 2023    PROGRESS NOTE    Subjective  Chief complaint: Ruth Wheatley is a 89 y.o. female who is a long term care patient being seen and evaluated for weakness    HPI:  Patient continues to work in therapy due to weakness and debility. Requires assistance for transfers, ADLs and mobility. Left knee pain improved with muscle rub and scheduled Tylenol. Denies chest pain or headache. Denies SOB or wheezing. No acute distress.       Objective  Vital signs: 128/64, 98%    Physical Exam  Constitutional:       General: She is not in acute distress.  Eyes:      Extraocular Movements: Extraocular movements intact.   Cardiovascular:      Rate and Rhythm: Normal rate and regular rhythm.   Pulmonary:      Effort: Pulmonary effort is normal.      Breath sounds: Normal breath sounds.   Abdominal:      General: Bowel sounds are normal.      Palpations: Abdomen is soft.   Musculoskeletal:      Cervical back: Neck supple.      Right lower leg: No edema.      Left lower leg: No edema.   Neurological:      Mental Status: She is alert.   Psychiatric:         Mood and Affect: Mood normal.         Behavior: Behavior is cooperative.         Assessment/Plan  Problem List Items Addressed This Visit       COPD without exacerbation (CMS/HCC)     Stable  Continue Breo inhaler  Aerosol treatment         Hypertension, essential     Continue  Norvasc   Continue Carvedilol and Cozaar  Continue to monitor BP         Weakness - Primary     Continue therapy         Left knee pain     Tylenol tid  Muscle rub          Medications, treatments, and labs reviewed  Continue medications and treatments as listed in [facility]    Scribe Attestation  By signing my name below, Tahmina GUSTAFSON, Scribe   attest that this documentation has been prepared under the direction and in the presence of Corrie Desouza MD.    Provider Attestation - Scribe documentation  All medical record entries made by the  Scribe were at my direction and personally dictated by me. I have reviewed the chart and agree that the record accurately reflects my personal performance of the history, physical exam, discussion and plan.    1. Weakness        2. Hypertension, essential        3. COPD without exacerbation (CMS/HCC)        4. Chronic pain of left knee               Electronically Signed By: Corrie Desouza MD   11/6/23 12:09 PM

## 2023-11-05 NOTE — PROGRESS NOTES
PROGRESS NOTE    Subjective   Chief complaint: Ruth Wheatley is a 89 y.o. female who is a long term care patient being seen and evaluated for weakness    HPI:  Patient continues to work in therapy due to weakness and debility. Requires assistance for transfers, ADLs and mobility. Left knee pain improved with muscle rub and scheduled Tylenol. Denies chest pain or headache. Denies SOB or wheezing. No acute distress.       Objective   Vital signs: 128/64, 98%    Physical Exam  Constitutional:       General: She is not in acute distress.  Eyes:      Extraocular Movements: Extraocular movements intact.   Cardiovascular:      Rate and Rhythm: Normal rate and regular rhythm.   Pulmonary:      Effort: Pulmonary effort is normal.      Breath sounds: Normal breath sounds.   Abdominal:      General: Bowel sounds are normal.      Palpations: Abdomen is soft.   Musculoskeletal:      Cervical back: Neck supple.      Right lower leg: No edema.      Left lower leg: No edema.   Neurological:      Mental Status: She is alert.   Psychiatric:         Mood and Affect: Mood normal.         Behavior: Behavior is cooperative.         Assessment/Plan   Problem List Items Addressed This Visit       COPD without exacerbation (CMS/[facility])     Stable  Continue Breo inhaler  Aerosol treatment         Hypertension, essential     Continue  Norvasc   Continue Carvedilol and Cozaar  Continue to monitor BP         Weakness - Primary     Continue therapy         Left knee pain     Tylenol tid  Muscle rub          Medications, treatments, and labs reviewed  Continue medications and treatments as listed in PCC    Scribe Attestation  By signing my name below, ITahmina Scribe   attest that this documentation has been prepared under the direction and in the presence of Corrie Desouza MD.    Provider Attestation - Scribe documentation  All medical record entries made by the Scribe were at my direction and personally dictated by me. I have reviewed  the chart and agree that the record accurately reflects my personal performance of the history, physical exam, discussion and plan.    1. Weakness        2. Hypertension, essential        3. COPD without exacerbation (CMS/[facility])        4. Chronic pain of left knee

## 2023-11-06 PROBLEM — M79.605 BILATERAL LEG PAIN: Status: ACTIVE | Noted: 2023-11-06

## 2023-11-06 PROBLEM — M79.604 BILATERAL LEG PAIN: Status: ACTIVE | Noted: 2023-11-06

## 2023-11-06 NOTE — PROGRESS NOTES
PROGRESS NOTE    Subjective   Chief complaint: Ruth Wheatley is a 89 y.o. female who is a long term care patient being seen and evaluated for follow-up of HTN and leg pain.    HPI:  HPI  Patient being seen for follow-up of hypertension.  Norvasc was increased on 10/5.  In addition patient has complaint of bilateral leg pain which is not new.  Tylenol is somewhat effective however pain has been waking patient up at night.  Patient has no further complaints or concerns.  Denies n/v/f/c.    Objective   Vital signs:   18, 169/77, 98.2, 100, 96%  Physical Exam  Constitutional:       General: She is not in acute distress.  Eyes:      Extraocular Movements: Extraocular movements intact.   Cardiovascular:      Rate and Rhythm: Normal rate and regular rhythm.   Pulmonary:      Effort: Pulmonary effort is normal.      Breath sounds: Normal breath sounds.   Musculoskeletal:      Cervical back: Neck supple.      Right lower leg: Edema present.      Left lower leg: Edema present.      Comments: BLE - 0 redness or discoloration, tender to palpation   Neurological:      Mental Status: She is alert.   Psychiatric:         Mood and Affect: Mood normal.         Behavior: Behavior is cooperative.         Assessment/Plan   Problem List Items Addressed This Visit       COPD without exacerbation (CMS/[facility])     Stable  Continue Breo inhaler  Aerosol treatment         Hypertension, essential     Continue  Norvasc   Continue Carvedilol and Cozaar  Monitor BP         Bilateral leg pain - Primary     Start muscle rub every hs  Change Tylenol to 1000 mg tid            Medications, treatments, and labs reviewed  Continue medications and treatments as listed in EMR    Scribe Attestation  I, Leslye Sifuentes   attest that this documentation has been prepared under the direction and in the presence of JANE Lorenzana-CNP    Provider Attestation - Scribe documentation  All medical record entries made by the Scribe were at my direction  and personally dictated by me. I have reviewed the chart and agree that the record accurately reflects my personal performance of the history, physical exam, discussion and plan.   Gi Piña, APRN-CNP

## 2023-11-08 ENCOUNTER — NURSING HOME VISIT (OUTPATIENT)
Dept: POST ACUTE CARE | Facility: EXTERNAL LOCATION | Age: 88
End: 2023-11-08
Payer: MEDICARE

## 2023-11-08 DIAGNOSIS — J44.9 COPD WITHOUT EXACERBATION (MULTI): ICD-10-CM

## 2023-11-08 DIAGNOSIS — I10 HYPERTENSION, ESSENTIAL: Primary | ICD-10-CM

## 2023-11-08 DIAGNOSIS — F32.A DEPRESSION, UNSPECIFIED DEPRESSION TYPE: ICD-10-CM

## 2023-11-08 PROCEDURE — 99309 SBSQ NF CARE MODERATE MDM 30: CPT | Performed by: INTERNAL MEDICINE

## 2023-11-08 NOTE — LETTER
Patient: Ruth Wheatley  : 10/30/1929    Encounter Date: 2023    PROGRESS NOTE    Subjective  Chief complaint: Ruth Wheatley is a 89 y.o. female who is a long term care patient being seen and evaluated for monthly general medical care and follow-up,     HPI:   patient presents for general medical care and f/u.  Patient seen and examined at bedside.  She  denies sob, wheezing, cough.  HTN,  Denies chest pain and headache.    Patient with diagnosis of depression.  Denies feeling down and thoughts of harming self or others. No acute distress.      Objective  Vital signs: 126/72, 96%    Physical Exam  Constitutional:       General: She is not in acute distress.  Eyes:      Extraocular Movements: Extraocular movements intact.   Pulmonary:      Effort: Pulmonary effort is normal.      Breath sounds: Normal breath sounds.   Abdominal:      General: Bowel sounds are normal.      Palpations: Abdomen is soft.   Musculoskeletal:      Cervical back: Neck supple.      Right lower leg: Edema present.      Left lower leg: Edema present.   Neurological:      Mental Status: She is alert.   Psychiatric:         Mood and Affect: Mood normal.         Behavior: Behavior is cooperative.         Assessment/Plan  Problem List Items Addressed This Visit       COPD without exacerbation (CMS/[facility])     Stable  Continue Breo inhaler  Aerosol treatment         Depression     Mood stable  Continue antihypertensives         Hypertension, essential - Primary     Continue  Norvasc   Continue Carvedilol and Cozaar  Monitor BP        Medications, treatments, and labs reviewed  Continue medications and treatments as listed in PCC    Scribe Attestation  By signing my name below, Tahmina GUSTAFSON Scribe   attest that this documentation has been prepared under the direction and in the presence of Corrie Desouza MD.    Provider Attestation - Scribe documentation  All medical record entries made by the Scribe were at my direction and personally  dictated by me. I have reviewed the chart and agree that the record accurately reflects my personal performance of the history, physical exam, discussion and plan.  1. Hypertension, essential        2. Depression, unspecified depression type        3. COPD without exacerbation (CMS/[facility])              Electronically Signed By: Corrie Desouza MD   11/8/23  5:38 PM

## 2023-11-08 NOTE — PROGRESS NOTES
PROGRESS NOTE    Subjective   Chief complaint: Ruth Wheatley is a 89 y.o. female who is a long term care patient being seen and evaluated for monthly general medical care and follow-up,     HPI:   patient presents for general medical care and f/u.  Patient seen and examined at bedside.  She  denies sob, wheezing, cough.  HTN,  Denies chest pain and headache.    Patient with diagnosis of depression.  Denies feeling down and thoughts of harming self or others. No acute distress.      Objective   Vital signs: 126/72, 96%    Physical Exam  Constitutional:       General: She is not in acute distress.  Eyes:      Extraocular Movements: Extraocular movements intact.   Pulmonary:      Effort: Pulmonary effort is normal.      Breath sounds: Normal breath sounds.   Abdominal:      General: Bowel sounds are normal.      Palpations: Abdomen is soft.   Musculoskeletal:      Cervical back: Neck supple.      Right lower leg: Edema present.      Left lower leg: Edema present.   Neurological:      Mental Status: She is alert.   Psychiatric:         Mood and Affect: Mood normal.         Behavior: Behavior is cooperative.         Assessment/Plan   Problem List Items Addressed This Visit       COPD without exacerbation (CMS/[facility])     Stable  Continue Breo inhaler  Aerosol treatment         Depression     Mood stable  Continue antihypertensives         Hypertension, essential - Primary     Continue  Norvasc   Continue Carvedilol and Cozaar  Monitor BP        Medications, treatments, and labs reviewed  Continue medications and treatments as listed in PCC    Scribe Attestation  By signing my name below, I, Leslye Yost   attest that this documentation has been prepared under the direction and in the presence of Corrie Desouza MD.    Provider Attestation - Scribe documentation  All medical record entries made by the Scribe were at my direction and personally dictated by me. I have reviewed the chart and agree that the record  accurately reflects my personal performance of the history, physical exam, discussion and plan.  1. Hypertension, essential        2. Depression, unspecified depression type        3. COPD without exacerbation (CMS/[facility])

## 2023-11-10 ENCOUNTER — NURSING HOME VISIT (OUTPATIENT)
Dept: POST ACUTE CARE | Facility: EXTERNAL LOCATION | Age: 88
End: 2023-11-10
Payer: MEDICARE

## 2023-11-10 DIAGNOSIS — R53.1 WEAKNESS: Primary | ICD-10-CM

## 2023-11-10 DIAGNOSIS — J44.9 COPD WITHOUT EXACERBATION (MULTI): ICD-10-CM

## 2023-11-10 DIAGNOSIS — I10 HYPERTENSION, ESSENTIAL: ICD-10-CM

## 2023-11-10 DIAGNOSIS — F32.A DEPRESSION, UNSPECIFIED DEPRESSION TYPE: ICD-10-CM

## 2023-11-10 PROCEDURE — 99308 SBSQ NF CARE LOW MDM 20: CPT | Performed by: INTERNAL MEDICINE

## 2023-11-10 NOTE — LETTER
Patient: Ruth Wheatley  : 10/30/1929    Encounter Date: 11/10/2023    PROGRESS NOTE    Subjective  Chief complaint: Ruth Wheatley is a 89 y.o. female who is a long term care patient being seen and evaluated for weakness    HPI:  Patient with depression, working in therapy due to weakness and debility. She requires assistance for transfers. She is working in Speech therapy for cognition. Mood stable, she denies feeling down. Denies cheat pain or headache, SOB or cough. No new concerns at this time. No acute distress.       Objective  Vital signs: 124/66, 98^%    Physical Exam  Constitutional:       General: She is not in acute distress.  Eyes:      Extraocular Movements: Extraocular movements intact.   Cardiovascular:      Rate and Rhythm: Normal rate and regular rhythm.   Pulmonary:      Effort: Pulmonary effort is normal.      Breath sounds: Normal breath sounds.   Abdominal:      General: Bowel sounds are normal.      Palpations: Abdomen is soft.   Musculoskeletal:      Cervical back: Neck supple.      Right lower leg: No edema.      Left lower leg: No edema.   Neurological:      Mental Status: She is alert.   Psychiatric:         Mood and Affect: Mood normal.         Behavior: Behavior is cooperative.         Assessment/Plan  Problem List Items Addressed This Visit       COPD without exacerbation (CMS/[facility])     Stable  Continue Breo inhaler  Aerosol treatment         Depression     Mood stable  Continue antidepressants  ST for cognition         Hypertension, essential     Continue  Norvasc   Continue Carvedilol and Cozaar  Monitor BP         Weakness - Primary     Continue therapy          Medications, treatments, and labs reviewed  Continue medications and treatments as listed in PCC    Scribe Attestation  By signing my name below, ITahmina, Brooklynnibdesean   attest that this documentation has been prepared under the direction and in the presence of Corrie Desouza MD.    Provider Attestation - Scribe  documentation  All medical record entries made by the Scribe were at my direction and personally dictated by me. I have reviewed the chart and agree that the record accurately reflects my personal performance of the history, physical exam, discussion and plan.    1. Weakness        2. Hypertension, essential        3. COPD without exacerbation (CMS/[facility])        4. Depression, unspecified depression type               Electronically Signed By: Corrie Desouza MD   11/10/23  6:17 PM

## 2023-11-10 NOTE — PROGRESS NOTES
PROGRESS NOTE    Subjective   Chief complaint: Ruth Wheatley is a 89 y.o. female who is a long term care patient being seen and evaluated for weakness    HPI:  Patient with depression, working in therapy due to weakness and debility. She requires assistance for transfers. She is working in Speech therapy for cognition. Mood stable, she denies feeling down. Denies cheat pain or headache, SOB or cough. No new concerns at this time. No acute distress.       Objective   Vital signs: 124/66, 98^%    Physical Exam  Constitutional:       General: She is not in acute distress.  Eyes:      Extraocular Movements: Extraocular movements intact.   Cardiovascular:      Rate and Rhythm: Normal rate and regular rhythm.   Pulmonary:      Effort: Pulmonary effort is normal.      Breath sounds: Normal breath sounds.   Abdominal:      General: Bowel sounds are normal.      Palpations: Abdomen is soft.   Musculoskeletal:      Cervical back: Neck supple.      Right lower leg: No edema.      Left lower leg: No edema.   Neurological:      Mental Status: She is alert.   Psychiatric:         Mood and Affect: Mood normal.         Behavior: Behavior is cooperative.         Assessment/Plan   Problem List Items Addressed This Visit       COPD without exacerbation (CMS/[facility])     Stable  Continue Breo inhaler  Aerosol treatment         Depression     Mood stable  Continue antidepressants  ST for cognition         Hypertension, essential     Continue  Norvasc   Continue Carvedilol and Cozaar  Monitor BP         Weakness - Primary     Continue therapy          Medications, treatments, and labs reviewed  Continue medications and treatments as listed in PCC    Scribe Attestation  By signing my name below, Tahmina GUSTAFSON Scribe   attest that this documentation has been prepared under the direction and in the presence of Corrie Desouza MD.    Provider Attestation - Scribe documentation  All medical record entries made by the Scribe were at my  direction and personally dictated by me. I have reviewed the chart and agree that the record accurately reflects my personal performance of the history, physical exam, discussion and plan.    1. Weakness        2. Hypertension, essential        3. COPD without exacerbation (CMS/[facility])        4. Depression, unspecified depression type

## 2023-11-15 ENCOUNTER — NURSING HOME VISIT (OUTPATIENT)
Dept: POST ACUTE CARE | Facility: EXTERNAL LOCATION | Age: 88
End: 2023-11-15
Payer: MEDICARE

## 2023-11-15 DIAGNOSIS — R53.1 WEAKNESS: Primary | ICD-10-CM

## 2023-11-15 DIAGNOSIS — I10 HYPERTENSION, ESSENTIAL: ICD-10-CM

## 2023-11-15 DIAGNOSIS — J44.9 COPD WITHOUT EXACERBATION (MULTI): ICD-10-CM

## 2023-11-15 PROCEDURE — 99308 SBSQ NF CARE LOW MDM 20: CPT | Performed by: INTERNAL MEDICINE

## 2023-11-15 NOTE — PROGRESS NOTES
PROGRESS NOTE    Subjective   Chief complaint: Ruth Wheatley is a 89 y.o. female who is a long term care patient being seen and evaluated for weakness    HPI:  HPI  Patient presents for follow-up of weakness, working with OT and ST.  OT working with patient due to generalized weakness, requiring assistance with transfers. ST working with patient for cognition.  Patient was seen and examined at bedside.  Denies nausea or vomiting.  Denies chest pain, headache or shortness of breath.  Staff report no new concerns at this time.     Objective   Vital signs: 147/72, 98.0, 94    Physical Exam  Constitutional:       General: She is not in acute distress.  Eyes:      Extraocular Movements: Extraocular movements intact.   Cardiovascular:      Rate and Rhythm: Normal rate and regular rhythm.   Pulmonary:      Effort: Pulmonary effort is normal.      Breath sounds: Normal breath sounds.   Abdominal:      General: Bowel sounds are normal.      Palpations: Abdomen is soft.   Musculoskeletal:      Cervical back: Neck supple.      Right lower leg: No edema.      Left lower leg: No edema.      Comments: Generalized weakness   Neurological:      Mental Status: She is alert.   Psychiatric:         Mood and Affect: Mood normal.         Behavior: Behavior is cooperative.         Assessment/Plan   Problem List Items Addressed This Visit          Cardiac and Vasculature    Hypertension, essential     Continue amlodipine, Carvedilol and Cozaar  Monitor BP            Pulmonary and Pneumonias    COPD without exacerbation (CMS/[facility])     Stable  Continue Breo inhaler  Aerosol treatment  No SOB            Symptoms and Signs    Weakness - Primary     Continue working with therapy          Medications, treatments, and labs reviewed  Continue medications and treatments as listed in EMR    Scribe Attestation  JANAY, Leslye Gregorio   attest that this documentation has been prepared under the direction and in the presence of Corrie Desouza  MD    Provider Attestation - Scribe documentation  All medical record entries made by the Scribe were at my direction and personally dictated by me. I have reviewed the chart and agree that the record accurately reflects my personal performance of the history, physical exam, discussion and plan.   Corrie Desouza MD

## 2023-11-15 NOTE — LETTER
Patient: Ruth Wheatley  : 10/30/1929    Encounter Date: 11/15/2023    PROGRESS NOTE    Subjective  Chief complaint: Ruth Wheatley is a 89 y.o. female who is a long term care patient being seen and evaluated for weakness    HPI:  HPI  Patient presents for follow-up of weakness, working with OT and ST.  OT working with patient due to generalized weakness, requiring assistance with transfers. ST working with patient for cognition.  Patient was seen and examined at bedside.  Denies nausea or vomiting.  Denies chest pain, headache or shortness of breath.  Staff report no new concerns at this time.     Objective  Vital signs: 147/72, 98.0, 94    Physical Exam  Constitutional:       General: She is not in acute distress.  Eyes:      Extraocular Movements: Extraocular movements intact.   Cardiovascular:      Rate and Rhythm: Normal rate and regular rhythm.   Pulmonary:      Effort: Pulmonary effort is normal.      Breath sounds: Normal breath sounds.   Abdominal:      General: Bowel sounds are normal.      Palpations: Abdomen is soft.   Musculoskeletal:      Cervical back: Neck supple.      Right lower leg: No edema.      Left lower leg: No edema.      Comments: Generalized weakness   Neurological:      Mental Status: She is alert.   Psychiatric:         Mood and Affect: Mood normal.         Behavior: Behavior is cooperative.         Assessment/Plan  Problem List Items Addressed This Visit          Cardiac and Vasculature    Hypertension, essential     Continue amlodipine, Carvedilol and Cozaar  Monitor BP            Pulmonary and Pneumonias    COPD without exacerbation (CMS/[facility])     Stable  Continue Breo inhaler  Aerosol treatment  No SOB            Symptoms and Signs    Weakness - Primary     Continue working with therapy          Medications, treatments, and labs reviewed  Continue medications and treatments as listed in EMR    Scribe Attestation  I, Leslye Gregorio   attest that this documentation has  been prepared under the direction and in the presence of Corrie Desouza MD    Provider Attestation - Scribe documentation  All medical record entries made by the Scribe were at my direction and personally dictated by me. I have reviewed the chart and agree that the record accurately reflects my personal performance of the history, physical exam, discussion and plan.   Corrie Desouza MD            Electronically Signed By: Corrie Desouza MD   11/15/23  8:15 PM

## 2023-11-22 ENCOUNTER — NURSING HOME VISIT (OUTPATIENT)
Dept: POST ACUTE CARE | Facility: EXTERNAL LOCATION | Age: 88
End: 2023-11-22
Payer: MEDICARE

## 2023-11-22 DIAGNOSIS — J44.9 COPD WITHOUT EXACERBATION (MULTI): ICD-10-CM

## 2023-11-22 DIAGNOSIS — R53.1 WEAKNESS: Primary | ICD-10-CM

## 2023-11-22 DIAGNOSIS — I10 HYPERTENSION, ESSENTIAL: ICD-10-CM

## 2023-11-22 PROCEDURE — 99308 SBSQ NF CARE LOW MDM 20: CPT | Performed by: INTERNAL MEDICINE

## 2023-11-22 NOTE — LETTER
Patient: Ruth Wheatley  : 10/30/1929    Encounter Date: 2023    PROGRESS NOTE    Subjective  Chief complaint: Ruth Wheatley is a 89 y.o. female who is a long term care patient being seen and evaluated for weakness    HPI:  Patient continues to work in therapy due to weakness and debility. She is progressing with therapy. She is improving with her balance, STS w supervision. She is able to stand over a minute. She is working with ST and her diet texture has been upgraded. She is now able to drink thin liquids and mechanical soft/chopped diet. Patient working in therapy and is working on functional mobility and safety awareness. She responds well to skilled interventions. ST educated on her discharge and progress and she will likely continue on thin liquids and will eventually upgrade to regular texture. Patient denies constitutional symptoms and is feeling well. Denies chest pain, SOB or cough. No acute distress.       Objective  Vital signs: 126/74, 98%    Physical Exam  Constitutional:       General: She is not in acute distress.  Eyes:      Extraocular Movements: Extraocular movements intact.   Cardiovascular:      Rate and Rhythm: Normal rate and regular rhythm.   Pulmonary:      Effort: Pulmonary effort is normal.      Breath sounds: Normal breath sounds.   Abdominal:      General: Bowel sounds are normal.      Palpations: Abdomen is soft.   Musculoskeletal:      Cervical back: Neck supple.      Right lower leg: No edema.      Left lower leg: No edema.   Neurological:      General: No focal deficit present.      Mental Status: She is alert. Mental status is at baseline.   Psychiatric:         Mood and Affect: Mood normal.         Behavior: Behavior is cooperative.         Assessment/Plan  Problem List Items Addressed This Visit       COPD without exacerbation (CMS/[facility])     Stable  Continue Breo inhaler  Aerosol treatment  No SOB         Hypertension, essential     Continue amlodipine, Carvedilol and  Cozaar  Monitor BP         Weakness - Primary     Continue working with therapy          Medications, treatments, and labs reviewed  Continue medications and treatments as listed in PCC    Scribe Attestation  By signing my name below, I, Leslye Yost   attest that this documentation has been prepared under the direction and in the presence of Corrie Desouza MD.    Provider Attestation - Scribe documentation  All medical record entries made by the Scribe were at my direction and personally dictated by me. I have reviewed the chart and agree that the record accurately reflects my personal performance of the history, physical exam, discussion and plan.    1. Weakness        2. COPD without exacerbation (CMS/[facility])        3. Hypertension, essential               Electronically Signed By: Corrie Desouza MD   11/22/23  5:50 PM

## 2023-11-22 NOTE — PROGRESS NOTES
PROGRESS NOTE    Subjective   Chief complaint: Ruth Wheatley is a 89 y.o. female who is a long term care patient being seen and evaluated for weakness    HPI:  Patient continues to work in therapy due to weakness and debility. She is progressing with therapy. She is improving with her balance, STS w supervision. She is able to stand over a minute. She is working with ST and her diet texture has been upgraded. She is now able to drink thin liquids and mechanical soft/chopped diet. Patient working in therapy and is working on functional mobility and safety awareness. She responds well to skilled interventions. ST educated on her discharge and progress and she will likely continue on thin liquids and will eventually upgrade to regular texture. Patient denies constitutional symptoms and is feeling well. Denies chest pain, SOB or cough. No acute distress.       Objective   Vital signs: 126/74, 98%    Physical Exam  Constitutional:       General: She is not in acute distress.  Eyes:      Extraocular Movements: Extraocular movements intact.   Cardiovascular:      Rate and Rhythm: Normal rate and regular rhythm.   Pulmonary:      Effort: Pulmonary effort is normal.      Breath sounds: Normal breath sounds.   Abdominal:      General: Bowel sounds are normal.      Palpations: Abdomen is soft.   Musculoskeletal:      Cervical back: Neck supple.      Right lower leg: No edema.      Left lower leg: No edema.   Neurological:      General: No focal deficit present.      Mental Status: She is alert. Mental status is at baseline.   Psychiatric:         Mood and Affect: Mood normal.         Behavior: Behavior is cooperative.         Assessment/Plan   Problem List Items Addressed This Visit       COPD without exacerbation (CMS/[facility])     Stable  Continue Breo inhaler  Aerosol treatment  No SOB         Hypertension, essential     Continue amlodipine, Carvedilol and Cozaar  Monitor BP         Weakness - Primary     Continue working with  therapy          Medications, treatments, and labs reviewed  Continue medications and treatments as listed in PCC    Scribe Attestation  By signing my name below, I, Leslye Yost   attest that this documentation has been prepared under the direction and in the presence of Corrie Desouza MD.    Provider Attestation - Scribe documentation  All medical record entries made by the Scribe were at my direction and personally dictated by me. I have reviewed the chart and agree that the record accurately reflects my personal performance of the history, physical exam, discussion and plan.    1. Weakness        2. COPD without exacerbation (CMS/[facility])        3. Hypertension, essential

## 2023-11-29 ENCOUNTER — NURSING HOME VISIT (OUTPATIENT)
Dept: POST ACUTE CARE | Facility: EXTERNAL LOCATION | Age: 88
End: 2023-11-29
Payer: MEDICARE

## 2023-11-29 DIAGNOSIS — R53.1 WEAKNESS: Primary | ICD-10-CM

## 2023-11-29 DIAGNOSIS — I10 HYPERTENSION, ESSENTIAL: ICD-10-CM

## 2023-11-29 DIAGNOSIS — J44.9 COPD WITHOUT EXACERBATION (MULTI): ICD-10-CM

## 2023-11-29 PROCEDURE — 99308 SBSQ NF CARE LOW MDM 20: CPT | Performed by: INTERNAL MEDICINE

## 2023-11-29 NOTE — LETTER
Patient: Ruth Wheatley  : 10/30/1929    Encounter Date: 2023    PROGRESS NOTE    Subjective  Chief complaint: Ruth Wheatley is a 89 y.o. female who is a long term care patient being seen and evaluated for weakness    HPI:  Patient working in therapy due to weakness and debility. She is working with OT for safety training and to increase independence functional mobility and safety around living space. Denies chest pain or headache, SOB or cough. No acute distress.       Objective  Vital signs: 124/76, 98%    Physical Exam  Constitutional:       General: She is not in acute distress.  Eyes:      Extraocular Movements: Extraocular movements intact.   Cardiovascular:      Rate and Rhythm: Normal rate and regular rhythm.   Pulmonary:      Effort: Pulmonary effort is normal.      Breath sounds: Normal breath sounds.   Abdominal:      General: Bowel sounds are normal.      Palpations: Abdomen is soft.   Musculoskeletal:      Cervical back: Neck supple.      Right lower leg: No edema.      Left lower leg: No edema.   Neurological:      Mental Status: She is alert.   Psychiatric:         Mood and Affect: Mood normal.         Behavior: Behavior is cooperative.         Assessment/Plan  Problem List Items Addressed This Visit       COPD without exacerbation (CMS/HCC)     Stable  Continue Breo inhaler  Aerosol treatment  No SOB         Hypertension, essential     Continue amlodipine, Carvedilol and Cozaar  Monitor BP         Weakness - Primary     Poor safety awareness - continue therapy          Medications, treatments, and labs reviewed  Continue medications and treatments as listed in PCC    Scribe Attestation  By signing my name below, Tahmina GUSTAFSON Scribe   attest that this documentation has been prepared under the direction and in the presence of Corrie Desouza MD.    Provider Attestation - Scribe documentation  All medical record entries made by the Scribe were at my direction and personally dictated by  me. I have reviewed the chart and agree that the record accurately reflects my personal performance of the history, physical exam, discussion and plan.    1. Weakness        2. COPD without exacerbation (CMS/HCC)        3. Hypertension, essential               Electronically Signed By: Corrie Desouza MD   11/29/23  7:29 PM

## 2023-11-29 NOTE — PROGRESS NOTES
PROGRESS NOTE    Subjective   Chief complaint: Ruth Wheatley is a 89 y.o. female who is a long term care patient being seen and evaluated for weakness    HPI:  Patient working in therapy due to weakness and debility. She is working with OT for safety training and to increase independence functional mobility and safety around living space. Denies chest pain or headache, SOB or cough. No acute distress.       Objective   Vital signs: 124/76, 98%    Physical Exam  Constitutional:       General: She is not in acute distress.  Eyes:      Extraocular Movements: Extraocular movements intact.   Cardiovascular:      Rate and Rhythm: Normal rate and regular rhythm.   Pulmonary:      Effort: Pulmonary effort is normal.      Breath sounds: Normal breath sounds.   Abdominal:      General: Bowel sounds are normal.      Palpations: Abdomen is soft.   Musculoskeletal:      Cervical back: Neck supple.      Right lower leg: No edema.      Left lower leg: No edema.   Neurological:      Mental Status: She is alert.   Psychiatric:         Mood and Affect: Mood normal.         Behavior: Behavior is cooperative.         Assessment/Plan   Problem List Items Addressed This Visit       COPD without exacerbation (CMS/[facility])     Stable  Continue Breo inhaler  Aerosol treatment  No SOB         Hypertension, essential     Continue amlodipine, Carvedilol and Cozaar  Monitor BP         Weakness - Primary     Poor safety awareness - continue therapy          Medications, treatments, and labs reviewed  Continue medications and treatments as listed in PCC    Scribe Attestation  By signing my name below, I, Leslye Yost   attest that this documentation has been prepared under the direction and in the presence of Corrie Desouza MD.    Provider Attestation - Scribe documentation  All medical record entries made by the Scribe were at my direction and personally dictated by me. I have reviewed the chart and agree that the record accurately  reflects my personal performance of the history, physical exam, discussion and plan.    1. Weakness        2. COPD without exacerbation (CMS/[facility])        3. Hypertension, essential

## 2023-12-06 ENCOUNTER — NURSING HOME VISIT (OUTPATIENT)
Dept: POST ACUTE CARE | Facility: EXTERNAL LOCATION | Age: 88
End: 2023-12-06
Payer: MEDICARE

## 2023-12-06 DIAGNOSIS — R53.1 WEAKNESS: Primary | ICD-10-CM

## 2023-12-06 DIAGNOSIS — F32.A DEPRESSION, UNSPECIFIED DEPRESSION TYPE: ICD-10-CM

## 2023-12-06 DIAGNOSIS — J44.9 COPD WITHOUT EXACERBATION (MULTI): ICD-10-CM

## 2023-12-06 DIAGNOSIS — I10 HYPERTENSION, ESSENTIAL: ICD-10-CM

## 2023-12-06 PROCEDURE — 99309 SBSQ NF CARE MODERATE MDM 30: CPT | Performed by: INTERNAL MEDICINE

## 2023-12-06 NOTE — LETTER
Patient: Ruth Wheatley  : 10/30/1929    Encounter Date: 2023    PROGRESS NOTE    Subjective  Chief complaint: Ruth Wheatley is a 89 y.o. female who is a long term care patient being seen and evaluated for weakness and monthly general medical care and follow-up.    HPI:  HPI  Patient presents for general medical care and f/u.  Patient seen and examined at bedside.  No issues per nursing.  Patient has no acute complaints.  COPD stable, denies sob, wheezing, cough.  HTN BP at goal.  Denies chest pain and headache.  Patient with diagnosis of depression.  Mood is stable.  Denies feeling down and thoughts of harming self or others.  Patient is working with OT for safety training to increase independence, functional mobility and safety around living space.  Mentation at baseline.  No acute distress    Objective  Vital signs: 147/72, 98.0, 94, 18, 97%    Physical Exam  Constitutional:       General: She is not in acute distress.  Eyes:      Extraocular Movements: Extraocular movements intact.   Cardiovascular:      Rate and Rhythm: Normal rate and regular rhythm.   Pulmonary:      Effort: Pulmonary effort is normal.      Breath sounds: Normal breath sounds.   Abdominal:      General: Bowel sounds are normal.      Palpations: Abdomen is soft.   Musculoskeletal:      Cervical back: Neck supple.      Right lower leg: No edema.      Left lower leg: No edema.   Neurological:      Mental Status: She is alert.   Psychiatric:         Mood and Affect: Mood normal.         Behavior: Behavior is cooperative.         Assessment/Plan  Problem List Items Addressed This Visit       COPD without exacerbation (CMS/[facility])     Stable  Continue Breo inhaler  Aerosol treatment  No SOB         Depression     Mood stable  Continue antidepressants         Hypertension, essential     Continue amlodipine, Carvedilol and Cozaar  Monitor BP         Weakness - Primary     Continue to work with OT to reach goals          Medications,  treatments, and labs reviewed  Continue medications and treatments as listed in EMR    Scribe Attestation  I, Leslye Gregorio   attest that this documentation has been prepared under the direction and in the presence of Corrie Desouza MD    Provider Attestation - Scribe documentation  All medical record entries made by the Scribe were at my direction and personally dictated by me. I have reviewed the chart and agree that the record accurately reflects my personal performance of the history, physical exam, discussion and plan.   Corrie Desouza MD            Electronically Signed By: Corrie Desouza MD   12/6/23  4:47 PM

## 2023-12-06 NOTE — PROGRESS NOTES
PROGRESS NOTE    Subjective   Chief complaint: Ruth Wheatley is a 89 y.o. female who is a long term care patient being seen and evaluated for weakness and monthly general medical care and follow-up.    HPI:  HPI  Patient presents for general medical care and f/u.  Patient seen and examined at bedside.  No issues per nursing.  Patient has no acute complaints.  COPD stable, denies sob, wheezing, cough.  HTN BP at goal.  Denies chest pain and headache.  Patient with diagnosis of depression.  Mood is stable.  Denies feeling down and thoughts of harming self or others.  Patient is working with OT for safety training to increase independence, functional mobility and safety around living space.  Mentation at baseline.  No acute distress    Objective   Vital signs: 147/72, 98.0, 94, 18, 97%    Physical Exam  Constitutional:       General: She is not in acute distress.  Eyes:      Extraocular Movements: Extraocular movements intact.   Cardiovascular:      Rate and Rhythm: Normal rate and regular rhythm.   Pulmonary:      Effort: Pulmonary effort is normal.      Breath sounds: Normal breath sounds.   Abdominal:      General: Bowel sounds are normal.      Palpations: Abdomen is soft.   Musculoskeletal:      Cervical back: Neck supple.      Right lower leg: No edema.      Left lower leg: No edema.   Neurological:      Mental Status: She is alert.   Psychiatric:         Mood and Affect: Mood normal.         Behavior: Behavior is cooperative.         Assessment/Plan   Problem List Items Addressed This Visit       COPD without exacerbation (CMS/[facility])     Stable  Continue Breo inhaler  Aerosol treatment  No SOB         Depression     Mood stable  Continue antidepressants         Hypertension, essential     Continue amlodipine, Carvedilol and Cozaar  Monitor BP         Weakness - Primary     Continue to work with OT to reach goals          Medications, treatments, and labs reviewed  Continue medications and treatments as listed in  EMR    Scribe Attestation  I, Leslye Gregorio   attest that this documentation has been prepared under the direction and in the presence of Corrie Desouza MD    Provider Attestation - Scribe documentation  All medical record entries made by the Scribe were at my direction and personally dictated by me. I have reviewed the chart and agree that the record accurately reflects my personal performance of the history, physical exam, discussion and plan.   Corrie Desouza MD

## 2023-12-08 ENCOUNTER — NURSING HOME VISIT (OUTPATIENT)
Dept: POST ACUTE CARE | Facility: EXTERNAL LOCATION | Age: 88
End: 2023-12-08
Payer: MEDICARE

## 2023-12-08 DIAGNOSIS — I10 HYPERTENSION, ESSENTIAL: ICD-10-CM

## 2023-12-08 DIAGNOSIS — R53.1 WEAKNESS: Primary | ICD-10-CM

## 2023-12-08 PROCEDURE — 99308 SBSQ NF CARE LOW MDM 20: CPT | Performed by: INTERNAL MEDICINE

## 2023-12-08 NOTE — LETTER
Patient: Ruth Wheatley  : 10/30/1929    Encounter Date: 2023    PROGRESS NOTE    Subjective  Chief complaint: Ruth Wheatley is a 89 y.o. female who is a long term care patient being seen and evaluated for Weakness    HPI: patient continues to work and Occupational Therapy for safety training to increase independence, functional mobility and safety around living space.  Patient denies chest pain and headache.  No acute distress.  HPI  Objective  Vital signs:  124/67, 98%    Physical Exam  Constitutional:       General: She is not in acute distress.  Eyes:      Extraocular Movements: Extraocular movements intact.   Pulmonary:      Effort: Pulmonary effort is normal.   Musculoskeletal:      Cervical back: Neck supple.   Neurological:      Mental Status: She is alert.   Psychiatric:         Mood and Affect: Mood normal.         Behavior: Behavior is cooperative.         Assessment/Plan  Problem List Items Addressed This Visit       Hypertension, essential     Continue amlodipine, Carvedilol and Cozaar  Monitor BP         Weakness - Primary     Continue to work with OT to reach goals          Medications, treatments, and labs reviewed  Continue medications and treatments as listed in PCC    Scribe Attestation  By signing my name below, ITahmina Scribe   attest that this documentation has been prepared under the direction and in the presence of Corrie Desouza MD.    Provider Attestation - Scribe documentation  All medical record entries made by the Scribe were at my direction and personally dictated by me. I have reviewed the chart and agree that the record accurately reflects my personal performance of the history, physical exam, discussion and plan.      An interactive audio and/or video telecommunication system which permits real time communications between the patient (at the originating site) and provider (at a distant site) was utilized to provide this telehealth service after obtaining verbal  consent.         Electronically Signed By: Corrie Desouza MD   12/9/23  1:31 PM

## 2023-12-08 NOTE — PROGRESS NOTES
PROGRESS NOTE    Subjective   Chief complaint: Ruth Wheatley is a 89 y.o. female who is a long term care patient being seen and evaluated for Weakness    HPI: patient continues to work and Occupational Therapy for safety training to increase independence, functional mobility and safety around living space.  Patient denies chest pain and headache.  No acute distress.  HPI  Objective   Vital signs:  124/67, 98%    Physical Exam  Constitutional:       General: She is not in acute distress.  Eyes:      Extraocular Movements: Extraocular movements intact.   Pulmonary:      Effort: Pulmonary effort is normal.   Musculoskeletal:      Cervical back: Neck supple.   Neurological:      Mental Status: She is alert.   Psychiatric:         Mood and Affect: Mood normal.         Behavior: Behavior is cooperative.         Assessment/Plan   Problem List Items Addressed This Visit       Hypertension, essential     Continue amlodipine, Carvedilol and Cozaar  Monitor BP         Weakness - Primary     Continue to work with OT to reach goals          Medications, treatments, and labs reviewed  Continue medications and treatments as listed in PCC    Scribe Attestation  By signing my name below, ITahmina Scribe   attest that this documentation has been prepared under the direction and in the presence of Corrie Desouza MD.    Provider Attestation - Scribe documentation  All medical record entries made by the Scribe were at my direction and personally dictated by me. I have reviewed the chart and agree that the record accurately reflects my personal performance of the history, physical exam, discussion and plan.      An interactive audio and/or video telecommunication system which permits real time communications between the patient (at the originating site) and provider (at a distant site) was utilized to provide this telehealth service after obtaining verbal consent.

## 2023-12-13 ENCOUNTER — NURSING HOME VISIT (OUTPATIENT)
Dept: POST ACUTE CARE | Facility: EXTERNAL LOCATION | Age: 88
End: 2023-12-13
Payer: MEDICARE

## 2023-12-13 DIAGNOSIS — I10 HYPERTENSION, ESSENTIAL: ICD-10-CM

## 2023-12-13 DIAGNOSIS — R53.1 WEAKNESS: Primary | ICD-10-CM

## 2023-12-13 DIAGNOSIS — F32.A DEPRESSION, UNSPECIFIED DEPRESSION TYPE: ICD-10-CM

## 2023-12-13 DIAGNOSIS — J44.9 COPD WITHOUT EXACERBATION (MULTI): ICD-10-CM

## 2023-12-13 PROCEDURE — 99308 SBSQ NF CARE LOW MDM 20: CPT | Performed by: INTERNAL MEDICINE

## 2023-12-13 NOTE — PROGRESS NOTES
PROGRESS NOTE    Subjective   Chief complaint: Ruth Wheatley is a 89 y.o. female who is a long term care patient being seen and evaluated for weakness.    HPI:  HPI  Patient continues to work with OT for safety training to increase independence, functional mobility and safety around living spaces.  Patient seen and examined at bedside.  Denies chest pain or shortness of breath.  Denies nausea or vomiting.  Denies fever or chills.  Patient in no acute distress.  Nursing staff voicing no new concerns at this time    Objective   Vital signs: 118/66, 97.6, 78, 17, 96%    Physical Exam  Constitutional:       General: She is not in acute distress.  Eyes:      Extraocular Movements: Extraocular movements intact.   Cardiovascular:      Rate and Rhythm: Normal rate and regular rhythm.   Pulmonary:      Effort: Pulmonary effort is normal.      Breath sounds: Normal breath sounds.   Abdominal:      General: Bowel sounds are normal.      Palpations: Abdomen is soft.   Musculoskeletal:      Cervical back: Neck supple.      Right lower leg: No edema.      Left lower leg: No edema.      Comments: Generalized weakness   Neurological:      Mental Status: She is alert.   Psychiatric:         Mood and Affect: Mood normal.         Behavior: Behavior is cooperative.         Assessment/Plan   Problem List Items Addressed This Visit       COPD without exacerbation (CMS/[facility])     Stable  Continue Breo inhaler  Aerosol treatment  No SOB         Depression     Mood stable  Continue antidepressants         Hypertension, essential     Continue amlodipine, Carvedilol and Cozaar  Monitor BP         Weakness - Primary     Continue to work with OT to progress towards goals          Medications, treatments, and labs reviewed  Continue medications and treatments as listed in EMR    Scribe Attestation  JANAY, Leslye Gregorio   attest that this documentation has been prepared under the direction and in the presence of Corrie Desouza  MD    Provider Attestation - Scribe documentation  All medical record entries made by the Scribe were at my direction and personally dictated by me. I have reviewed the chart and agree that the record accurately reflects my personal performance of the history, physical exam, discussion and plan.   Corrie Desouza MD

## 2023-12-13 NOTE — LETTER
Patient: Ruth Wheatley  : 10/30/1929    Encounter Date: 2023    PROGRESS NOTE    Subjective  Chief complaint: Ruth Wheatley is a 89 y.o. female who is a long term care patient being seen and evaluated for weakness.    HPI:  HPI  Patient continues to work with OT for safety training to increase independence, functional mobility and safety around living spaces.  Patient seen and examined at bedside.  Denies chest pain or shortness of breath.  Denies nausea or vomiting.  Denies fever or chills.  Patient in no acute distress.  Nursing staff voicing no new concerns at this time    Objective  Vital signs: 118/66, 97.6, 78, 17, 96%    Physical Exam  Constitutional:       General: She is not in acute distress.  Eyes:      Extraocular Movements: Extraocular movements intact.   Cardiovascular:      Rate and Rhythm: Normal rate and regular rhythm.   Pulmonary:      Effort: Pulmonary effort is normal.      Breath sounds: Normal breath sounds.   Abdominal:      General: Bowel sounds are normal.      Palpations: Abdomen is soft.   Musculoskeletal:      Cervical back: Neck supple.      Right lower leg: No edema.      Left lower leg: No edema.      Comments: Generalized weakness   Neurological:      Mental Status: She is alert.   Psychiatric:         Mood and Affect: Mood normal.         Behavior: Behavior is cooperative.         Assessment/Plan  Problem List Items Addressed This Visit       COPD without exacerbation (CMS/[facility])     Stable  Continue Breo inhaler  Aerosol treatment  No SOB         Depression     Mood stable  Continue antidepressants         Hypertension, essential     Continue amlodipine, Carvedilol and Cozaar  Monitor BP         Weakness - Primary     Continue to work with OT to progress towards goals          Medications, treatments, and labs reviewed  Continue medications and treatments as listed in EMR    Scribe Attestation  I, Leslye Gregorio   attest that this documentation has been prepared  under the direction and in the presence of Corrie Desouza MD    Provider Attestation - Scribe documentation  All medical record entries made by the Scribe were at my direction and personally dictated by me. I have reviewed the chart and agree that the record accurately reflects my personal performance of the history, physical exam, discussion and plan.   Corrie Desouza MD            Electronically Signed By: Corrie Desouza MD   12/13/23  1:58 PM

## 2023-12-20 ENCOUNTER — NURSING HOME VISIT (OUTPATIENT)
Dept: POST ACUTE CARE | Facility: EXTERNAL LOCATION | Age: 88
End: 2023-12-20
Payer: MEDICARE

## 2023-12-20 DIAGNOSIS — I10 HYPERTENSION, ESSENTIAL: ICD-10-CM

## 2023-12-20 DIAGNOSIS — J44.9 COPD WITHOUT EXACERBATION (MULTI): ICD-10-CM

## 2023-12-20 DIAGNOSIS — R53.1 WEAKNESS: Primary | ICD-10-CM

## 2023-12-20 PROCEDURE — 99308 SBSQ NF CARE LOW MDM 20: CPT | Performed by: INTERNAL MEDICINE

## 2023-12-20 NOTE — PROGRESS NOTES
PROGRESS NOTE    Subjective   Chief complaint: Ruth Wheatley is a 89 y.o. female who is a long term care patient being seen and evaluated for weakness.    HPI:  HPI  Patient is seen in follow-up to therapy.  Patient is working with OT for safety training to increase independence, functional mobility and safety around living space.  Patient seen and examined at bedside, no acute distress.  Denies chest pain or shortness of breath.  Denies nausea or vomiting.  Mentation at baseline.  Nursing reporting no new concerns at this time.    Objective   Vital signs: 122/80, 90.2, 79, 18, 97%    Physical Exam  Constitutional:       General: She is not in acute distress.  Eyes:      Extraocular Movements: Extraocular movements intact.   Cardiovascular:      Rate and Rhythm: Normal rate and regular rhythm.   Pulmonary:      Effort: Pulmonary effort is normal.      Breath sounds: Normal breath sounds.   Abdominal:      General: Bowel sounds are normal.      Palpations: Abdomen is soft.   Musculoskeletal:      Cervical back: Neck supple.      Right lower leg: No edema.      Left lower leg: No edema.      Comments: Generalized weakness   Neurological:      Mental Status: She is alert.   Psychiatric:         Mood and Affect: Mood normal.         Behavior: Behavior is cooperative.         Assessment/Plan   Problem List Items Addressed This Visit       COPD without exacerbation (CMS/[facility])     Stable  Continue Breo inhaler  Aerosol treatment  No SOB         Hypertension, essential     Continue amlodipine, Carvedilol and Cozaar  Monitor BP         Weakness - Primary     Continue to work with OT to progress towards goals          Medications, treatments, and labs reviewed  Continue medications and treatments as listed in EMR    Scribe Attestation  I, Leslye Gregorio   attest that this documentation has been prepared under the direction and in the presence of Corrie Desouza MD    Provider Attestation - Scribe documentation  All  medical record entries made by the Scribe were at my direction and personally dictated by me. I have reviewed the chart and agree that the record accurately reflects my personal performance of the history, physical exam, discussion and plan.   Corrie Desouza MD

## 2023-12-20 NOTE — LETTER
Patient: Ruth Wheatley  : 10/30/1929    Encounter Date: 2023    PROGRESS NOTE    Subjective  Chief complaint: Ruth Wheatley is a 89 y.o. female who is a long term care patient being seen and evaluated for weakness.    HPI:  HPI  Patient is seen in follow-up to therapy.  Patient is working with OT for safety training to increase independence, functional mobility and safety around living space.  Patient seen and examined at bedside, no acute distress.  Denies chest pain or shortness of breath.  Denies nausea or vomiting.  Mentation at baseline.  Nursing reporting no new concerns at this time.    Objective  Vital signs: 122/80, 90.2, 79, 18, 97%    Physical Exam  Constitutional:       General: She is not in acute distress.  Eyes:      Extraocular Movements: Extraocular movements intact.   Cardiovascular:      Rate and Rhythm: Normal rate and regular rhythm.   Pulmonary:      Effort: Pulmonary effort is normal.      Breath sounds: Normal breath sounds.   Abdominal:      General: Bowel sounds are normal.      Palpations: Abdomen is soft.   Musculoskeletal:      Cervical back: Neck supple.      Right lower leg: No edema.      Left lower leg: No edema.      Comments: Generalized weakness   Neurological:      Mental Status: She is alert.   Psychiatric:         Mood and Affect: Mood normal.         Behavior: Behavior is cooperative.         Assessment/Plan  Problem List Items Addressed This Visit       COPD without exacerbation (CMS/HCC)     Stable  Continue Breo inhaler  Aerosol treatment  No SOB         Hypertension, essential     Continue amlodipine, Carvedilol and Cozaar  Monitor BP         Weakness - Primary     Continue to work with OT to progress towards goals          Medications, treatments, and labs reviewed  Continue medications and treatments as listed in EMR    Scribe Attestation  JANAY, Leslye Gregorio   attest that this documentation has been prepared under the direction and in the presence of  Corrie Desouza MD    Provider Attestation - Scribe documentation  All medical record entries made by the Scribe were at my direction and personally dictated by me. I have reviewed the chart and agree that the record accurately reflects my personal performance of the history, physical exam, discussion and plan.   Corrie Desouza MD            Electronically Signed By: Corrie Desouza MD   12/20/23  4:49 PM

## 2023-12-27 ENCOUNTER — NURSING HOME VISIT (OUTPATIENT)
Dept: POST ACUTE CARE | Facility: EXTERNAL LOCATION | Age: 88
End: 2023-12-27
Payer: MEDICARE

## 2023-12-27 DIAGNOSIS — F32.A DEPRESSION, UNSPECIFIED DEPRESSION TYPE: ICD-10-CM

## 2023-12-27 DIAGNOSIS — R53.1 WEAKNESS: Primary | ICD-10-CM

## 2023-12-27 DIAGNOSIS — I10 HYPERTENSION, ESSENTIAL: ICD-10-CM

## 2023-12-27 DIAGNOSIS — J44.9 COPD WITHOUT EXACERBATION (MULTI): ICD-10-CM

## 2023-12-27 PROCEDURE — 99308 SBSQ NF CARE LOW MDM 20: CPT | Performed by: INTERNAL MEDICINE

## 2023-12-27 NOTE — LETTER
Patient: Ruth Wheatley  : 10/30/1929    Encounter Date: 2023    PROGRESS NOTE    Subjective  Chief complaint: Ruth Wheatley is a 89 y.o. female who is a long term care patient being seen and evaluated for weakness    HPI:  HPI  Patient continues to work in OT.  Skilled interventions focused on analysis\training and cueing hierarchy to increase independence with ADLs.  Patient also working on dynamic functional activities to increase strength, range of motion, flexibility.  Patient is responding well to skilled interventions.  Patient seen and examined at bedside, no acute distress.  Denies chest pain or shortness of breath.  Denies nausea or vomiting.    Objective  Vital signs: 132/72, 98.0, 64, 18, 96%    Physical Exam  Constitutional:       General: She is not in acute distress.  Eyes:      Extraocular Movements: Extraocular movements intact.   Cardiovascular:      Rate and Rhythm: Normal rate and regular rhythm.   Pulmonary:      Effort: Pulmonary effort is normal.      Breath sounds: Normal breath sounds.   Abdominal:      General: Bowel sounds are normal.      Palpations: Abdomen is soft.   Musculoskeletal:      Cervical back: Neck supple.      Right lower leg: No edema.      Left lower leg: No edema.      Comments: Generalized weakness   Neurological:      Mental Status: She is alert.   Psychiatric:         Mood and Affect: Mood normal.         Behavior: Behavior is cooperative.         Assessment/Plan  Problem List Items Addressed This Visit       COPD without exacerbation (CMS/HCC)     Stable  Continue Breo inhaler  Aerosol treatment  No SOB         Depression     Mood stable  Continue antidepressants         Hypertension, essential     Continue amlodipine, Carvedilol and Cozaar  Monitor BP         Weakness - Primary     Continue to work with OT to progress towards goals          Medications, treatments, and labs reviewed  Continue medications and treatments as listed in EMR    Scribe  Attestation  I, Leslye Gregorio   attest that this documentation has been prepared under the direction and in the presence of Corrie Desouza MD    Provider Attestation - Scribe documentation  All medical record entries made by the Scribe were at my direction and personally dictated by me. I have reviewed the chart and agree that the record accurately reflects my personal performance of the history, physical exam, discussion and plan.   Corrie Desouza MD            Electronically Signed By: Corrie Desouza MD   12/27/23  4:50 PM

## 2023-12-27 NOTE — PROGRESS NOTES
PROGRESS NOTE    Subjective   Chief complaint: Ruth Wheatley is a 89 y.o. female who is a long term care patient being seen and evaluated for weakness    HPI:  HPI  Patient continues to work in OT.  Skilled interventions focused on analysis\training and cueing hierarchy to increase independence with ADLs.  Patient also working on dynamic functional activities to increase strength, range of motion, flexibility.  Patient is responding well to skilled interventions.  Patient seen and examined at bedside, no acute distress.  Denies chest pain or shortness of breath.  Denies nausea or vomiting.    Objective   Vital signs: 132/72, 98.0, 64, 18, 96%    Physical Exam  Constitutional:       General: She is not in acute distress.  Eyes:      Extraocular Movements: Extraocular movements intact.   Cardiovascular:      Rate and Rhythm: Normal rate and regular rhythm.   Pulmonary:      Effort: Pulmonary effort is normal.      Breath sounds: Normal breath sounds.   Abdominal:      General: Bowel sounds are normal.      Palpations: Abdomen is soft.   Musculoskeletal:      Cervical back: Neck supple.      Right lower leg: No edema.      Left lower leg: No edema.      Comments: Generalized weakness   Neurological:      Mental Status: She is alert.   Psychiatric:         Mood and Affect: Mood normal.         Behavior: Behavior is cooperative.         Assessment/Plan   Problem List Items Addressed This Visit       COPD without exacerbation (CMS/[facility])     Stable  Continue Breo inhaler  Aerosol treatment  No SOB         Depression     Mood stable  Continue antidepressants         Hypertension, essential     Continue amlodipine, Carvedilol and Cozaar  Monitor BP         Weakness - Primary     Continue to work with OT to progress towards goals          Medications, treatments, and labs reviewed  Continue medications and treatments as listed in EMR    Scribe Attestation  JANAY, Leslye Gregorio   attest that this documentation has been  prepared under the direction and in the presence of Corrie Desouza MD    Provider Attestation - Scribe documentation  All medical record entries made by the Scribe were at my direction and personally dictated by me. I have reviewed the chart and agree that the record accurately reflects my personal performance of the history, physical exam, discussion and plan.   Corrie Desouza MD

## 2024-01-03 ENCOUNTER — NURSING HOME VISIT (OUTPATIENT)
Dept: POST ACUTE CARE | Facility: EXTERNAL LOCATION | Age: 89
End: 2024-01-03
Payer: MEDICARE

## 2024-01-03 DIAGNOSIS — J44.9 COPD WITHOUT EXACERBATION (MULTI): ICD-10-CM

## 2024-01-03 DIAGNOSIS — F32.A DEPRESSION, UNSPECIFIED DEPRESSION TYPE: ICD-10-CM

## 2024-01-03 DIAGNOSIS — R53.1 WEAKNESS: Primary | ICD-10-CM

## 2024-01-03 PROCEDURE — 99308 SBSQ NF CARE LOW MDM 20: CPT | Performed by: INTERNAL MEDICINE

## 2024-01-03 NOTE — LETTER
Patient: Ruth Wheatley  : 10/30/1929    Encounter Date: 2024    PROGRESS NOTE    Subjective  Chief complaint: Ruth Wheatley is a 89 y.o. female who is a long term care patient being seen and evaluated for weakness.    HPI:  HPI  Patient continues to work with occupational therapy.  Therapy addressing therapeutic exercise and activities and self-care management.  Patient was seen and examined at bedside, in no apparent distress.  Denies chest pain or shortness of breath.  Denies nausea or vomiting.  Nursing reporting no new concerns at this time.    Objective  Vital signs: 132/70, 97.6, 65, 17, 97%    Physical Exam  Constitutional:       General: She is not in acute distress.  Eyes:      Extraocular Movements: Extraocular movements intact.   Cardiovascular:      Rate and Rhythm: Normal rate and regular rhythm.   Pulmonary:      Effort: Pulmonary effort is normal.      Breath sounds: Normal breath sounds.   Abdominal:      General: Bowel sounds are normal.      Palpations: Abdomen is soft.   Musculoskeletal:      Cervical back: Neck supple.      Right lower leg: No edema.      Left lower leg: No edema.      Comments: Generalized weakness   Neurological:      Mental Status: She is alert.   Psychiatric:         Mood and Affect: Mood normal.         Behavior: Behavior is cooperative.         Assessment/Plan  Problem List Items Addressed This Visit       COPD without exacerbation (CMS/[facility])     Stable  Continue Breo inhaler  Aerosol treatment  No SOB         Depression     Mood stable  Continue antidepressants         Weakness - Primary     Continue to work with OT          Medications, treatments, and labs reviewed  Continue medications and treatments as listed in EMR    Scribe Attestation  I, Leslye Gregorio   attest that this documentation has been prepared under the direction and in the presence of Corrie Desouza MD    Provider Attestation - Scribe documentation  All medical record entries made by  the Scribe were at my direction and personally dictated by me. I have reviewed the chart and agree that the record accurately reflects my personal performance of the history, physical exam, discussion and plan.   Crorie Desouza MD            Electronically Signed By: Corrie Desouza MD   1/3/24  7:02 PM

## 2024-01-03 NOTE — PROGRESS NOTES
PROGRESS NOTE    Subjective   Chief complaint: Ruth Wheatley is a 89 y.o. female who is a long term care patient being seen and evaluated for weakness.    HPI:  HPI  Patient continues to work with occupational therapy.  Therapy addressing therapeutic exercise and activities and self-care management.  Patient was seen and examined at bedside, in no apparent distress.  Denies chest pain or shortness of breath.  Denies nausea or vomiting.  Nursing reporting no new concerns at this time.    Objective   Vital signs: 132/70, 97.6, 65, 17, 97%    Physical Exam  Constitutional:       General: She is not in acute distress.  Eyes:      Extraocular Movements: Extraocular movements intact.   Cardiovascular:      Rate and Rhythm: Normal rate and regular rhythm.   Pulmonary:      Effort: Pulmonary effort is normal.      Breath sounds: Normal breath sounds.   Abdominal:      General: Bowel sounds are normal.      Palpations: Abdomen is soft.   Musculoskeletal:      Cervical back: Neck supple.      Right lower leg: No edema.      Left lower leg: No edema.      Comments: Generalized weakness   Neurological:      Mental Status: She is alert.   Psychiatric:         Mood and Affect: Mood normal.         Behavior: Behavior is cooperative.         Assessment/Plan   Problem List Items Addressed This Visit       COPD without exacerbation (CMS/HCC)     Stable  Continue Breo inhaler  Aerosol treatment  No SOB         Depression     Mood stable  Continue antidepressants         Weakness - Primary     Continue to work with OT          Medications, treatments, and labs reviewed  Continue medications and treatments as listed in EMR    Scribe Attestation  I, Leslye Gregorio   attest that this documentation has been prepared under the direction and in the presence of Corrie Desouza MD    Provider Attestation - Scribe documentation  All medical record entries made by the Scribe were at my direction and personally dictated by me. I have  reviewed the chart and agree that the record accurately reflects my personal performance of the history, physical exam, discussion and plan.   Corrie Desouza MD

## 2024-01-05 ENCOUNTER — NURSING HOME VISIT (OUTPATIENT)
Dept: POST ACUTE CARE | Facility: EXTERNAL LOCATION | Age: 89
End: 2024-01-05
Payer: MEDICARE

## 2024-01-05 DIAGNOSIS — F32.A DEPRESSION, UNSPECIFIED DEPRESSION TYPE: ICD-10-CM

## 2024-01-05 DIAGNOSIS — J44.9 COPD WITHOUT EXACERBATION (MULTI): ICD-10-CM

## 2024-01-05 DIAGNOSIS — I10 HYPERTENSION, ESSENTIAL: ICD-10-CM

## 2024-01-05 DIAGNOSIS — R53.1 WEAKNESS: Primary | ICD-10-CM

## 2024-01-05 PROCEDURE — 99309 SBSQ NF CARE MODERATE MDM 30: CPT | Performed by: INTERNAL MEDICINE

## 2024-01-05 NOTE — LETTER
Patient: Ruth Wheatley  : 10/30/1929    Encounter Date: 2024    PROGRESS NOTE    Subjective  Chief complaint: Ruth Wheatley is a 89 y.o. female who is a long term care patient being seen and evaluated for weakness and monthly general medical care and follow-up    HPI:  HPI  Patient presents for general medical care and f/u.  Patient seen and examined at bedside.  No issues per nursing.  Patient has no acute complaints.  Patient is working in therapy.  Patient is completing dynamic sitting balance task while seated in wheelchair.  Patient is demonstrating ability to reach in all planes with minimal fatigue noted.  HTN BP at goal.  Denies chest pain and headache.  COPD stable, denies sob, wheezing, cough.  Patient with diagnosis of depression.  Mood is stable.  Denies feeling down and thoughts of harming self or others.  Mentation at baseline.  Patient no acute distress    Objective  Vital signs: 128/55, 97.6, 62, 18, 94%    Physical Exam  Constitutional:       General: She is not in acute distress.  Eyes:      Extraocular Movements: Extraocular movements intact.   Cardiovascular:      Rate and Rhythm: Normal rate and regular rhythm.   Pulmonary:      Effort: Pulmonary effort is normal.      Breath sounds: Normal breath sounds.   Abdominal:      General: Bowel sounds are normal.      Palpations: Abdomen is soft.   Musculoskeletal:      Cervical back: Neck supple.      Right lower leg: No edema.      Left lower leg: No edema.      Comments: Generalized weakness   Neurological:      Mental Status: She is alert.   Psychiatric:         Mood and Affect: Mood normal.         Behavior: Behavior is cooperative.         Assessment/Plan  Problem List Items Addressed This Visit       COPD without exacerbation (CMS/HCC)     Stable  Continue Breo inhaler  Aerosol treatment  No SOB         Depression     Mood stable  Continue antidepressants         Hypertension, essential     Continue amlodipine, Carvedilol and  Cozaar  Monitor BP         Weakness - Primary     Continue to work in therapy to progress towards goals          Medications, treatments, and labs reviewed  Continue medications and treatments as listed in EMR    Scribe Attestation  I, Leslye Gregorio   attest that this documentation has been prepared under the direction and in the presence of Corrie Desouza MD    Provider Attestation - Scribe documentation  All medical record entries made by the Scribe were at my direction and personally dictated by me. I have reviewed the chart and agree that the record accurately reflects my personal performance of the history, physical exam, discussion and plan.   Corrie Desouza MD            Electronically Signed By: Corrie Desouza MD   1/5/24  3:43 PM

## 2024-01-05 NOTE — PROGRESS NOTES
PROGRESS NOTE    Subjective   Chief complaint: Ruth Wheatley is a 89 y.o. female who is a long term care patient being seen and evaluated for weakness and monthly general medical care and follow-up    HPI:  HPI  Patient presents for general medical care and f/u.  Patient seen and examined at bedside.  No issues per nursing.  Patient has no acute complaints.  Patient is working in therapy.  Patient is completing dynamic sitting balance task while seated in wheelchair.  Patient is demonstrating ability to reach in all planes with minimal fatigue noted.  HTN BP at goal.  Denies chest pain and headache.  COPD stable, denies sob, wheezing, cough.  Patient with diagnosis of depression.  Mood is stable.  Denies feeling down and thoughts of harming self or others.  Mentation at baseline.  Patient no acute distress    Objective   Vital signs: 128/55, 97.6, 62, 18, 94%    Physical Exam  Constitutional:       General: She is not in acute distress.  Eyes:      Extraocular Movements: Extraocular movements intact.   Cardiovascular:      Rate and Rhythm: Normal rate and regular rhythm.   Pulmonary:      Effort: Pulmonary effort is normal.      Breath sounds: Normal breath sounds.   Abdominal:      General: Bowel sounds are normal.      Palpations: Abdomen is soft.   Musculoskeletal:      Cervical back: Neck supple.      Right lower leg: No edema.      Left lower leg: No edema.      Comments: Generalized weakness   Neurological:      Mental Status: She is alert.   Psychiatric:         Mood and Affect: Mood normal.         Behavior: Behavior is cooperative.         Assessment/Plan   Problem List Items Addressed This Visit       COPD without exacerbation (CMS/HCC)     Stable  Continue Breo inhaler  Aerosol treatment  No SOB         Depression     Mood stable  Continue antidepressants         Hypertension, essential     Continue amlodipine, Carvedilol and Cozaar  Monitor BP         Weakness - Primary     Continue to work in therapy  to progress towards goals          Medications, treatments, and labs reviewed  Continue medications and treatments as listed in EMR    Scribe Attestation  I, Leslye Gregorio   attest that this documentation has been prepared under the direction and in the presence of Corrie Desouza MD    Provider Attestation - Scribe documentation  All medical record entries made by the Scribe were at my direction and personally dictated by me. I have reviewed the chart and agree that the record accurately reflects my personal performance of the history, physical exam, discussion and plan.   Corrie Desouza MD

## 2024-01-10 ENCOUNTER — NURSING HOME VISIT (OUTPATIENT)
Dept: POST ACUTE CARE | Facility: EXTERNAL LOCATION | Age: 89
End: 2024-01-10
Payer: MEDICARE

## 2024-01-10 DIAGNOSIS — R53.1 WEAKNESS: Primary | ICD-10-CM

## 2024-01-10 DIAGNOSIS — I10 HYPERTENSION, ESSENTIAL: ICD-10-CM

## 2024-01-10 DIAGNOSIS — F32.A DEPRESSION, UNSPECIFIED DEPRESSION TYPE: ICD-10-CM

## 2024-01-10 DIAGNOSIS — J44.9 COPD WITHOUT EXACERBATION (MULTI): ICD-10-CM

## 2024-01-10 PROCEDURE — 99309 SBSQ NF CARE MODERATE MDM 30: CPT | Performed by: INTERNAL MEDICINE

## 2024-01-10 NOTE — LETTER
Patient: Ruth Wheatley  : 10/30/1929    Encounter Date: 01/10/2024    PROGRESS NOTE    Subjective  Chief complaint: Ruth Wheatley is a 89 y.o. female who is a long term care patient being seen and evaluated for weakness    HPI:  HPI  Patient continues to work with OT.  Therapy continues to work with patient on dynamic sitting balance while seated in wheelchair.  Patient is demonstrating ability to reach in all planes with minimal fatigue noted.  Patient was seen and examined at bedside, in no apparent distress.  Denies chest pain or shortness of breath.  Denies nausea or vomiting.  Nursing reporting no new concerns at this time.    Objective  Vital signs: 130/85, 98.0, 76, 18, 96%    Physical Exam  Constitutional:       General: She is not in acute distress.  Eyes:      Extraocular Movements: Extraocular movements intact.   Cardiovascular:      Rate and Rhythm: Normal rate and regular rhythm.   Pulmonary:      Effort: Pulmonary effort is normal.      Breath sounds: Normal breath sounds.   Abdominal:      General: Bowel sounds are normal.      Palpations: Abdomen is soft.   Musculoskeletal:      Cervical back: Neck supple.      Right lower leg: No edema.      Left lower leg: No edema.      Comments: Generalized weakness   Neurological:      Mental Status: She is alert.   Psychiatric:         Mood and Affect: Mood normal.         Behavior: Behavior is cooperative.         Assessment/Plan  Problem List Items Addressed This Visit       COPD without exacerbation (CMS/[facility])     Stable  Continue Breo inhaler  Aerosol treatment  No SOB         Depression     Mood stable  Continue antidepressants         Hypertension, essential     Continue amlodipine, Carvedilol and Cozaar  Monitor BP         Weakness - Primary     Continue to work with OT          Medications, treatments, and labs reviewed  Continue medications and treatments as listed in EMR    Scribe Attestation  I, Leslye Gregorio   attest that this  documentation has been prepared under the direction and in the presence of Corrie Desouza MD    Provider Attestation - Scribe documentation  All medical record entries made by the Scribe were at my direction and personally dictated by me. I have reviewed the chart and agree that the record accurately reflects my personal performance of the history, physical exam, discussion and plan.   Corrie Desouza MD            Electronically Signed By: Corrie Desouza MD   1/10/24  2:50 PM

## 2024-01-10 NOTE — PROGRESS NOTES
PROGRESS NOTE    Subjective   Chief complaint: Ruth Wheatley is a 89 y.o. female who is a long term care patient being seen and evaluated for weakness    HPI:  HPI  Patient continues to work with OT.  Therapy continues to work with patient on dynamic sitting balance while seated in wheelchair.  Patient is demonstrating ability to reach in all planes with minimal fatigue noted.  Patient was seen and examined at bedside, in no apparent distress.  Denies chest pain or shortness of breath.  Denies nausea or vomiting.  Nursing reporting no new concerns at this time.    Objective   Vital signs: 130/85, 98.0, 76, 18, 96%    Physical Exam  Constitutional:       General: She is not in acute distress.  Eyes:      Extraocular Movements: Extraocular movements intact.   Cardiovascular:      Rate and Rhythm: Normal rate and regular rhythm.   Pulmonary:      Effort: Pulmonary effort is normal.      Breath sounds: Normal breath sounds.   Abdominal:      General: Bowel sounds are normal.      Palpations: Abdomen is soft.   Musculoskeletal:      Cervical back: Neck supple.      Right lower leg: No edema.      Left lower leg: No edema.      Comments: Generalized weakness   Neurological:      Mental Status: She is alert.   Psychiatric:         Mood and Affect: Mood normal.         Behavior: Behavior is cooperative.         Assessment/Plan   Problem List Items Addressed This Visit       COPD without exacerbation (CMS/[facility])     Stable  Continue Breo inhaler  Aerosol treatment  No SOB         Depression     Mood stable  Continue antidepressants         Hypertension, essential     Continue amlodipine, Carvedilol and Cozaar  Monitor BP         Weakness - Primary     Continue to work with OT          Medications, treatments, and labs reviewed  Continue medications and treatments as listed in EMR    Scribe Attestation  JANAY, Leslye Gregorio   attest that this documentation has been prepared under the direction and in the presence of  Corrie Desouaz MD    Provider Attestation - Scribe documentation  All medical record entries made by the Scribe were at my direction and personally dictated by me. I have reviewed the chart and agree that the record accurately reflects my personal performance of the history, physical exam, discussion and plan.   Corrie Desouza MD

## 2024-01-17 ENCOUNTER — NURSING HOME VISIT (OUTPATIENT)
Dept: POST ACUTE CARE | Facility: EXTERNAL LOCATION | Age: 89
End: 2024-01-17
Payer: MEDICARE

## 2024-01-17 DIAGNOSIS — I10 HYPERTENSION, ESSENTIAL: ICD-10-CM

## 2024-01-17 DIAGNOSIS — R53.1 WEAKNESS: Primary | ICD-10-CM

## 2024-01-17 DIAGNOSIS — J44.9 COPD WITHOUT EXACERBATION (MULTI): ICD-10-CM

## 2024-01-17 PROCEDURE — 99308 SBSQ NF CARE LOW MDM 20: CPT | Performed by: INTERNAL MEDICINE

## 2024-01-17 NOTE — PROGRESS NOTES
PROGRESS NOTE    Subjective   Chief complaint: Ruth Wheatley is a 89 y.o. female who is a long term care patient being seen and evaluated for weakness.    HPI:  HPI  Patient is working in therapy due to weakness.  Therapy is focusing on analysis and training and cueing hierarchy to increase independence with ADLs.  Therapy is also working with patient on dynamic functional activities to increase strength and ROM and flexibility.  Patient seen and examined at bedside, no apparent distress.  Denies chest pain or shortness of breath.  Denies nausea or vomiting.  Nursing reporting no new concerns at this time.    Objective   Vital signs: 130/64, 98.0, 70, 18, 96%    Physical Exam  Constitutional:       General: She is not in acute distress.  Eyes:      Extraocular Movements: Extraocular movements intact.   Cardiovascular:      Rate and Rhythm: Normal rate and regular rhythm.   Pulmonary:      Effort: Pulmonary effort is normal.      Breath sounds: Normal breath sounds.   Abdominal:      General: Bowel sounds are normal.      Palpations: Abdomen is soft.   Musculoskeletal:      Cervical back: Neck supple.      Right lower leg: No edema.      Left lower leg: No edema.      Comments: Generalized weakness   Neurological:      Mental Status: She is alert.   Psychiatric:         Mood and Affect: Mood normal.         Behavior: Behavior is cooperative.         Assessment/Plan   Problem List Items Addressed This Visit       COPD without exacerbation (CMS/HCC)     Stable  Continue Breo inhaler  Aerosol treatment  No SOB         Hypertension, essential     Continue amlodipine, Carvedilol and Cozaar  Monitor BP         Weakness - Primary     Continue to work with OT towards goals          Medications, treatments, and labs reviewed  Continue medications and treatments as listed in EMR    Brooklynnibe Attestation  JANAY, Leslye Gregorio   attest that this documentation has been prepared under the direction and in the presence of  Corrie Desouza MD    Provider Attestation - Scribe documentation  All medical record entries made by the Scribe were at my direction and personally dictated by me. I have reviewed the chart and agree that the record accurately reflects my personal performance of the history, physical exam, discussion and plan.   Corrie Desouza MD

## 2024-01-17 NOTE — LETTER
Patient: Ruth Wheatley  : 10/30/1929    Encounter Date: 2024    PROGRESS NOTE    Subjective  Chief complaint: Ruth Wheatley is a 89 y.o. female who is a long term care patient being seen and evaluated for weakness.    HPI:  HPI  Patient is working in therapy due to weakness.  Therapy is focusing on analysis and training and cueing hierarchy to increase independence with ADLs.  Therapy is also working with patient on dynamic functional activities to increase strength and ROM and flexibility.  Patient seen and examined at bedside, no apparent distress.  Denies chest pain or shortness of breath.  Denies nausea or vomiting.  Nursing reporting no new concerns at this time.    Objective  Vital signs: 130/64, 98.0, 70, 18, 96%    Physical Exam  Constitutional:       General: She is not in acute distress.  Eyes:      Extraocular Movements: Extraocular movements intact.   Cardiovascular:      Rate and Rhythm: Normal rate and regular rhythm.   Pulmonary:      Effort: Pulmonary effort is normal.      Breath sounds: Normal breath sounds.   Abdominal:      General: Bowel sounds are normal.      Palpations: Abdomen is soft.   Musculoskeletal:      Cervical back: Neck supple.      Right lower leg: No edema.      Left lower leg: No edema.      Comments: Generalized weakness   Neurological:      Mental Status: She is alert.   Psychiatric:         Mood and Affect: Mood normal.         Behavior: Behavior is cooperative.         Assessment/Plan  Problem List Items Addressed This Visit       COPD without exacerbation (CMS/[facility])     Stable  Continue Breo inhaler  Aerosol treatment  No SOB         Hypertension, essential     Continue amlodipine, Carvedilol and Cozaar  Monitor BP         Weakness - Primary     Continue to work with OT towards goals          Medications, treatments, and labs reviewed  Continue medications and treatments as listed in EMR    Scribe Attestation  I, Leslye Gregorio   attest that this  documentation has been prepared under the direction and in the presence of Corrie Desouza MD    Provider Attestation - Scribe documentation  All medical record entries made by the Scribe were at my direction and personally dictated by me. I have reviewed the chart and agree that the record accurately reflects my personal performance of the history, physical exam, discussion and plan.   Corrie Desouza MD            Electronically Signed By: Corrie Desouza MD   1/17/24  1:50 PM

## 2024-01-24 ENCOUNTER — NURSING HOME VISIT (OUTPATIENT)
Dept: POST ACUTE CARE | Facility: EXTERNAL LOCATION | Age: 89
End: 2024-01-24
Payer: MEDICARE

## 2024-01-24 DIAGNOSIS — F32.A DEPRESSION, UNSPECIFIED DEPRESSION TYPE: Primary | ICD-10-CM

## 2024-01-24 DIAGNOSIS — I10 HYPERTENSION, ESSENTIAL: ICD-10-CM

## 2024-01-24 PROCEDURE — 99308 SBSQ NF CARE LOW MDM 20: CPT | Performed by: INTERNAL MEDICINE

## 2024-01-24 NOTE — PROGRESS NOTES
PROGRESS NOTE    Subjective   Chief complaint: Ruth Wheatley is a 89 y.o. female who is a long term care patient being seen and evaluated for depression    HPI:  HPI  Patient with a diagnosis of depression.  Asked to evaluate continued use of escitalopram.  Patient's mood is stable, tolerating medication without adverse effects.  Mood is stable.  Patient seen and examined at bedside, no apparent distress.    Objective   Vital signs: 128/62, 98.0, 74, 16, 98%    Physical Exam  Constitutional:       General: She is not in acute distress.  Eyes:      Extraocular Movements: Extraocular movements intact.   Pulmonary:      Effort: Pulmonary effort is normal.   Musculoskeletal:      Cervical back: Neck supple.   Neurological:      Mental Status: She is alert.   Psychiatric:         Mood and Affect: Mood normal.         Behavior: Behavior is cooperative.         Assessment/Plan   Problem List Items Addressed This Visit       Depression - Primary     Monitor mood  Lexapro 10 mg daily         Hypertension, essential     Continue amlodipine, Carvedilol and Cozaar  Monitor BP          Medications, treatments, and labs reviewed  Continue medications and treatments as listed in EMR    Scribe Attestation  I, Brooklynn Gregorioibdesean   attest that this documentation has been prepared under the direction and in the presence of Corrie Desouza MD    Provider Attestation - Scribe documentation  All medical record entries made by the Scribe were at my direction and personally dictated by me. I have reviewed the chart and agree that the record accurately reflects my personal performance of the history, physical exam, discussion and plan.   Corrie Desouza MD

## 2024-01-24 NOTE — LETTER
Patient: Ruth Wheatley  : 10/30/1929    Encounter Date: 2024    PROGRESS NOTE    Subjective  Chief complaint: Ruth Wheatley is a 89 y.o. female who is a long term care patient being seen and evaluated for depression    HPI:  HPI  Patient with a diagnosis of depression.  Asked to evaluate continued use of escitalopram.  Patient's mood is stable, tolerating medication without adverse effects.  Mood is stable.  Patient seen and examined at bedside, no apparent distress.    Objective  Vital signs: 128/62, 98.0, 74, 16, 98%    Physical Exam  Constitutional:       General: She is not in acute distress.  Eyes:      Extraocular Movements: Extraocular movements intact.   Pulmonary:      Effort: Pulmonary effort is normal.   Musculoskeletal:      Cervical back: Neck supple.   Neurological:      Mental Status: She is alert.   Psychiatric:         Mood and Affect: Mood normal.         Behavior: Behavior is cooperative.         Assessment/Plan  Problem List Items Addressed This Visit       Depression - Primary     Monitor mood  Lexapro 10 mg daily         Hypertension, essential     Continue amlodipine, Carvedilol and Cozaar  Monitor BP          Medications, treatments, and labs reviewed  Continue medications and treatments as listed in EMR    Scribe Attestation  I, Selena Luna Scribe   attest that this documentation has been prepared under the direction and in the presence of Corrie Desouza MD    Provider Attestation - Scribe documentation  All medical record entries made by the Scribe were at my direction and personally dictated by me. I have reviewed the chart and agree that the record accurately reflects my personal performance of the history, physical exam, discussion and plan.   Corrie Desouza MD            Electronically Signed By: Corrie Desouza MD   24  2:56 PM

## 2024-01-29 ENCOUNTER — LAB REQUISITION (OUTPATIENT)
Dept: LAB | Facility: HOSPITAL | Age: 89
End: 2024-01-29
Payer: MEDICARE

## 2024-01-29 ENCOUNTER — NURSING HOME VISIT (OUTPATIENT)
Dept: POST ACUTE CARE | Facility: EXTERNAL LOCATION | Age: 89
End: 2024-01-29
Payer: MEDICARE

## 2024-01-29 DIAGNOSIS — R06.02 SOB (SHORTNESS OF BREATH): Primary | ICD-10-CM

## 2024-01-29 DIAGNOSIS — R05.1 ACUTE COUGH: ICD-10-CM

## 2024-01-29 DIAGNOSIS — J44.9 COPD WITHOUT EXACERBATION (MULTI): ICD-10-CM

## 2024-01-29 DIAGNOSIS — R53.83 OTHER FATIGUE: ICD-10-CM

## 2024-01-29 DIAGNOSIS — I10 HYPERTENSION, ESSENTIAL: ICD-10-CM

## 2024-01-29 LAB
ANION GAP SERPL CALC-SCNC: 11 MMOL/L (ref 10–20)
BASOPHILS # BLD AUTO: 0.02 X10*3/UL (ref 0–0.1)
BASOPHILS NFR BLD AUTO: 0.6 %
BUN SERPL-MCNC: 14 MG/DL (ref 6–23)
CALCIUM SERPL-MCNC: 8.2 MG/DL (ref 8.6–10.3)
CHLORIDE SERPL-SCNC: 103 MMOL/L (ref 98–107)
CO2 SERPL-SCNC: 29 MMOL/L (ref 21–32)
CREAT SERPL-MCNC: 0.55 MG/DL (ref 0.5–1.05)
EGFRCR SERPLBLD CKD-EPI 2021: 85 ML/MIN/1.73M*2
EOSINOPHIL # BLD AUTO: 0.13 X10*3/UL (ref 0–0.4)
EOSINOPHIL NFR BLD AUTO: 3.9 %
ERYTHROCYTE [DISTWIDTH] IN BLOOD BY AUTOMATED COUNT: 12.3 % (ref 11.5–14.5)
GLUCOSE SERPL-MCNC: 105 MG/DL (ref 74–99)
HCT VFR BLD AUTO: 37.6 % (ref 36–46)
HGB BLD-MCNC: 11.9 G/DL (ref 12–16)
IMM GRANULOCYTES # BLD AUTO: 0.01 X10*3/UL (ref 0–0.5)
IMM GRANULOCYTES NFR BLD AUTO: 0.3 % (ref 0–0.9)
LYMPHOCYTES # BLD AUTO: 0.77 X10*3/UL (ref 0.8–3)
LYMPHOCYTES NFR BLD AUTO: 23.1 %
MCH RBC QN AUTO: 32.1 PG (ref 26–34)
MCHC RBC AUTO-ENTMCNC: 31.6 G/DL (ref 32–36)
MCV RBC AUTO: 101 FL (ref 80–100)
MONOCYTES # BLD AUTO: 0.73 X10*3/UL (ref 0.05–0.8)
MONOCYTES NFR BLD AUTO: 21.9 %
NEUTROPHILS # BLD AUTO: 1.68 X10*3/UL (ref 1.6–5.5)
NEUTROPHILS NFR BLD AUTO: 50.2 %
NRBC BLD-RTO: 0 /100 WBCS (ref 0–0)
PLATELET # BLD AUTO: 170 X10*3/UL (ref 150–450)
POTASSIUM SERPL-SCNC: 3.9 MMOL/L (ref 3.5–5.3)
RBC # BLD AUTO: 3.71 X10*6/UL (ref 4–5.2)
SODIUM SERPL-SCNC: 139 MMOL/L (ref 136–145)
WBC # BLD AUTO: 3.3 X10*3/UL (ref 4.4–11.3)

## 2024-01-29 PROCEDURE — 80048 BASIC METABOLIC PNL TOTAL CA: CPT

## 2024-01-29 PROCEDURE — 99309 SBSQ NF CARE MODERATE MDM 30: CPT | Performed by: NURSE PRACTITIONER

## 2024-01-29 PROCEDURE — 85025 COMPLETE CBC W/AUTO DIFF WBC: CPT

## 2024-01-29 NOTE — LETTER
Patient: Ruth Wheatley  : 10/30/1929    Encounter Date: 2024    PROGRESS NOTE    Subjective  Chief complaint: Ruth Wheatley is a 89 y.o. female who is a long term care patient being seen and evaluated for SOB.     HPI:  Patient seen and examined at bedside. She c\o SOB, cough with green phlegm. She denies f\c. Nursing has no other concerns.       Objective  Vital signs: 135/70,71,98%    Physical Exam  Constitutional:       General: She is not in acute distress.  Eyes:      Extraocular Movements: Extraocular movements intact.   Cardiovascular:      Rate and Rhythm: Normal rate and regular rhythm.   Pulmonary:      Effort: Pulmonary effort is normal.      Breath sounds: Decreased breath sounds present.   Musculoskeletal:      Cervical back: Neck supple.   Neurological:      Mental Status: She is alert.   Psychiatric:         Mood and Affect: Mood normal.         Behavior: Behavior is cooperative.         Assessment/Plan  Problem List Items Addressed This Visit       Acute cough     Increase robitussin to 15ml QID  Monitor cough         COPD (chronic obstructive pulmonary disease) (CMS/[facility])     Continue Breo inhaler  Change duoneb to TID scheduled   Obtain CXR  BMP, CBC  Perform COVID test  Monitor         Hypertension, essential     Continue to monitor  BP at goal         SOB (shortness of breath) - Primary     Obtain CXR  BMP, CBC  Perform COVID test  Monitor          Medications, treatments, and labs reviewed  Continue medications and treatments as listed in EMR      Scribe Attestation  I, Leslye Colon   attest that this documentation has been prepared under the direction and in the presence of ISAURO Lorenzana.     Provider Attestation - Scribe documentation  All medical record entries made by the Scribe were at my direction and personally dictated by me. I have reviewed the chart and agree that the record accurately reflects my personal performance of the history, physical exam,  discussion and plan.   ISAURO Lorenzana      Electronically Signed By: ISAURO Lorenzana   2/12/24  6:17 PM

## 2024-01-30 ENCOUNTER — NURSING HOME VISIT (OUTPATIENT)
Dept: POST ACUTE CARE | Facility: EXTERNAL LOCATION | Age: 89
End: 2024-01-30
Payer: MEDICARE

## 2024-01-30 DIAGNOSIS — I10 HYPERTENSION, ESSENTIAL: ICD-10-CM

## 2024-01-30 DIAGNOSIS — U07.1 COVID: Primary | ICD-10-CM

## 2024-01-30 DIAGNOSIS — J44.9 COPD WITHOUT EXACERBATION (MULTI): ICD-10-CM

## 2024-01-30 PROBLEM — R05.1 ACUTE COUGH: Status: ACTIVE | Noted: 2024-01-30

## 2024-01-30 PROCEDURE — 99309 SBSQ NF CARE MODERATE MDM 30: CPT | Performed by: NURSE PRACTITIONER

## 2024-01-30 NOTE — LETTER
Patient: Ruth Wheatley  : 10/30/1929    Encounter Date: 2024    PROGRESS NOTE    Subjective  Chief complaint: Ruth Wheatley is a 89 y.o. female who is a long term care patient being seen and evaluated for f/u cough.    HPI:  HPI  Patient seen for follow-up of cough, was scheduled on aerosol treatments and increased Robitussin.  Orders placed to obtain labs, chest x-ray and COVID test.  Chest x-ray was negative for acute process.  COVID test positive.  Orders placed to obtain BMP, CBC, D-dimer.  CBC and BMP were unremarkable.  Patient seen and examined at bedside, appears to be in no apparent distress.  Patient is on isolation precautions, afebrile.    Objective  Vital signs: 120/62, 98.0, 76, 18, 97%    Physical Exam  Constitutional:       General: She is not in acute distress.  Eyes:      Extraocular Movements: Extraocular movements intact.   Cardiovascular:      Rate and Rhythm: Normal rate and regular rhythm.   Pulmonary:      Effort: Pulmonary effort is normal.      Breath sounds: Decreased breath sounds present.   Musculoskeletal:      Cervical back: Neck supple.   Neurological:      Mental Status: She is alert.   Psychiatric:         Mood and Affect: Mood normal.         Behavior: Behavior is cooperative.         Assessment/Plan  Problem List Items Addressed This Visit       COPD without exacerbation (CMS/[facility])     Continue Breo inhaler  Duonebs TID scheduled   Monitor         COVID - Primary     Isolation precautions.  Supportive treatment  Labs unremarkable  Chest x-ray negative         Hypertension, essential     BP at goal  Continue amlodipine, Carvedilol and Cozaar  Monitor BP          Medications, treatments, and labs reviewed  Continue medications and treatments as listed in EMR    Scribe Attestation  I, Leslye Gregorio   attest that this documentation has been prepared under the direction and in the presence of JANE Lorenzana-CNP    Provider Attestation - Brooklynnibe  documentation  All medical record entries made by the Scribe were at my direction and personally dictated by me. I have reviewed the chart and agree that the record accurately reflects my personal performance of the history, physical exam, discussion and plan.   ISAURO Lorenzana            Electronically Signed By: ISAURO Lorenzana   2/7/24  9:33 AM

## 2024-01-30 NOTE — PROGRESS NOTES
PROGRESS NOTE    Subjective   Chief complaint: Ruth Wheatley is a 89 y.o. female who is a long term care patient being seen and evaluated for SOB.     HPI:  Patient seen and examined at bedside. She c\o SOB, cough with green phlegm. She denies f\c. Nursing has no other concerns.       Objective   Vital signs: 135/70,71,98%    Physical Exam  Constitutional:       General: She is not in acute distress.  Eyes:      Extraocular Movements: Extraocular movements intact.   Cardiovascular:      Rate and Rhythm: Normal rate and regular rhythm.   Pulmonary:      Effort: Pulmonary effort is normal.      Breath sounds: Decreased breath sounds present.   Musculoskeletal:      Cervical back: Neck supple.   Neurological:      Mental Status: She is alert.   Psychiatric:         Mood and Affect: Mood normal.         Behavior: Behavior is cooperative.         Assessment/Plan   Problem List Items Addressed This Visit       Acute cough     Increase robitussin to 15ml QID  Monitor cough         COPD (chronic obstructive pulmonary disease) (CMS/[facility])     Continue Breo inhaler  Change duoneb to TID scheduled   Obtain CXR  BMP, CBC  Perform COVID test  Monitor         Hypertension, essential     Continue to monitor  BP at goal         SOB (shortness of breath) - Primary     Obtain CXR  BMP, CBC  Perform COVID test  Monitor          Medications, treatments, and labs reviewed  Continue medications and treatments as listed in EMR      Scribe Attestation  I, Leslye Colon   attest that this documentation has been prepared under the direction and in the presence of ISAURO Lorenzana.     Provider Attestation - Scribe documentation  All medical record entries made by the Scribe were at my direction and personally dictated by me. I have reviewed the chart and agree that the record accurately reflects my personal performance of the history, physical exam, discussion and plan.   ISAURO Lorenzana

## 2024-01-30 NOTE — ASSESSMENT & PLAN NOTE
Continue Breo inhaler  Change duoneb to TID scheduled   Obtain CXR  BMP, CBC  Perform COVID test  Monitor

## 2024-01-31 ENCOUNTER — NURSING HOME VISIT (OUTPATIENT)
Dept: POST ACUTE CARE | Facility: EXTERNAL LOCATION | Age: 89
End: 2024-01-31
Payer: MEDICARE

## 2024-01-31 DIAGNOSIS — I10 HYPERTENSION, ESSENTIAL: ICD-10-CM

## 2024-01-31 DIAGNOSIS — U07.1 COVID: Primary | ICD-10-CM

## 2024-01-31 PROCEDURE — 99309 SBSQ NF CARE MODERATE MDM 30: CPT | Performed by: INTERNAL MEDICINE

## 2024-01-31 NOTE — LETTER
Patient: Ruth Wheatley  : 10/30/1929    Encounter Date: 2024    PROGRESS NOTE    Subjective  Chief complaint: Ruth Wheatley is a 89 y.o. female who is a long term care patient being seen and evaluated for weakness.    HPI:  HPI  Patient is seen due to reports of patient being COVID-positive.  Patient with mild congestion and cough.  Patient did have chest x-ray obtained which was negative.  Patient had labs, BMP and CBC which were unremarkable.  Patient is on isolation precautions.  Afebrile.  Patient was seen and examined at the  bedside, appears to be in no apparent distress.    Objective  Vital signs: 120/62, 98.0, 74    Physical Exam  Constitutional:       General: She is not in acute distress.  Eyes:      Extraocular Movements: Extraocular movements intact.   Pulmonary:      Effort: Pulmonary effort is normal.      Breath sounds: Decreased breath sounds present.   Musculoskeletal:      Cervical back: Neck supple.   Neurological:      Mental Status: She is alert.   Psychiatric:         Mood and Affect: Mood normal.         Behavior: Behavior is cooperative.         Assessment/Plan  Problem List Items Addressed This Visit       Hypertension, essential     Continue amlodipine, Carvedilol and Cozaar  Monitor BP         COVID - Primary     Isolation precautions.  Supportive treatment  Labs unremarkable  Chest x-ray negative          Medications, treatments, and labs reviewed  Continue medications and treatments as listed in EMR    Scribe Attestation  I, Leslye Gregorio   attest that this documentation has been prepared under the direction and in the presence of Corrie Desouza MD    Provider Attestation - Scribe documentation  All medical record entries made by the Scribe were at my direction and personally dictated by me. I have reviewed the chart and agree that the record accurately reflects my personal performance of the history, physical exam, discussion and plan.   Corrie Desuoza,  MD            Electronically Signed By: Corrie Desouza MD   1/31/24  5:32 PM

## 2024-01-31 NOTE — PROGRESS NOTES
PROGRESS NOTE    Subjective   Chief complaint: Ruth Wheatley is a 89 y.o. female who is a long term care patient being seen and evaluated for weakness.    HPI:  HPI  Patient is seen due to reports of patient being COVID-positive.  Patient with mild congestion and cough.  Patient did have chest x-ray obtained which was negative.  Patient had labs, BMP and CBC which were unremarkable.  Patient is on isolation precautions.  Afebrile.  Patient was seen and examined at the  bedside, appears to be in no apparent distress.    Objective   Vital signs: 120/62, 98.0, 74    Physical Exam  Constitutional:       General: She is not in acute distress.  Eyes:      Extraocular Movements: Extraocular movements intact.   Pulmonary:      Effort: Pulmonary effort is normal.      Breath sounds: Decreased breath sounds present.   Musculoskeletal:      Cervical back: Neck supple.   Neurological:      Mental Status: She is alert.   Psychiatric:         Mood and Affect: Mood normal.         Behavior: Behavior is cooperative.         Assessment/Plan   Problem List Items Addressed This Visit       Hypertension, essential     Continue amlodipine, Carvedilol and Cozaar  Monitor BP         COVID - Primary     Isolation precautions.  Supportive treatment  Labs unremarkable  Chest x-ray negative          Medications, treatments, and labs reviewed  Continue medications and treatments as listed in EMR    Scribe Attestation  I, Leslye Gregorio   attest that this documentation has been prepared under the direction and in the presence of Corrie Desouza MD    Provider Attestation - Scribe documentation  All medical record entries made by the Scribe were at my direction and personally dictated by me. I have reviewed the chart and agree that the record accurately reflects my personal performance of the history, physical exam, discussion and plan.   Corrie Desouza MD

## 2024-02-01 ENCOUNTER — NURSING HOME VISIT (OUTPATIENT)
Dept: POST ACUTE CARE | Facility: EXTERNAL LOCATION | Age: 89
End: 2024-02-01
Payer: MEDICARE

## 2024-02-01 DIAGNOSIS — J44.9 COPD WITHOUT EXACERBATION (MULTI): ICD-10-CM

## 2024-02-01 DIAGNOSIS — I10 HYPERTENSION, ESSENTIAL: ICD-10-CM

## 2024-02-01 DIAGNOSIS — U07.1 COVID: Primary | ICD-10-CM

## 2024-02-01 PROCEDURE — 99309 SBSQ NF CARE MODERATE MDM 30: CPT | Performed by: NURSE PRACTITIONER

## 2024-02-01 NOTE — ASSESSMENT & PLAN NOTE
Patient is stable.  Isolation precautions.  Supportive treatment  Labs unremarkable  Chest x-ray negative

## 2024-02-01 NOTE — PROGRESS NOTES
PROGRESS NOTE    Subjective   Chief complaint: Ruth Wheatley is a 89 y.o. female who is a long term care patient being seen and evaluated for COVID.    HPI:  HPI  Patient is seen due to reports of patient being COVID-positive.  Patient had labs obtained that were unremarkable.  Patient had chest x-ray that was negative.  Patient is on isolation precautions.  Patient is afebrile at this time.  Patient continues on scheduled aerosol treatments and Robitussin.  Patient seen and examined at bedside, appears to be in no apparent distress.  Denies chest pain or shortness of breath.  Denies nausea, vomiting, abdominal pain.    Objective   Vital signs: 134/67, 98.6, 78, 18, 95%    Physical Exam  Constitutional:       General: She is not in acute distress.  Eyes:      Extraocular Movements: Extraocular movements intact.   Cardiovascular:      Rate and Rhythm: Normal rate and regular rhythm.   Pulmonary:      Effort: Pulmonary effort is normal.      Breath sounds: Decreased breath sounds present.   Musculoskeletal:      Cervical back: Neck supple.   Neurological:      Mental Status: She is alert.   Psychiatric:         Mood and Affect: Mood normal.         Behavior: Behavior is cooperative.         Assessment/Plan   Problem List Items Addressed This Visit       COPD without exacerbation (CMS/HCC)     No shortness of breath or wheezing.  Continue Breo inhaler  Duonebs TID scheduled   Monitor         COVID - Primary     Patient is stable.  Isolation precautions.  Supportive treatment  Labs unremarkable  Chest x-ray negative         Hypertension, essential     Blood pressure at goal  Continue amlodipine, Carvedilol and Cozaar  Monitor BP          Medications, treatments, and labs reviewed  Continue medications and treatments as listed in EMR    Scribe Attestation  JANAY, Leslye Gregorio   attest that this documentation has been prepared under the direction and in the presence of ISAURO Lorenzana    Provider  Attestation - Scribe documentation  All medical record entries made by the Scribe were at my direction and personally dictated by me. I have reviewed the chart and agree that the record accurately reflects my personal performance of the history, physical exam, discussion and plan.   Gi Piña, APRN-CNP

## 2024-02-01 NOTE — LETTER
Patient: Ruth Wheatley  : 10/30/1929    Encounter Date: 2024    PROGRESS NOTE    Subjective  Chief complaint: Ruth Wheatley is a 89 y.o. female who is a long term care patient being seen and evaluated for COVID.    HPI:  HPI  Patient is seen due to reports of patient being COVID-positive.  Patient had labs obtained that were unremarkable.  Patient had chest x-ray that was negative.  Patient is on isolation precautions.  Patient is afebrile at this time.  Patient continues on scheduled aerosol treatments and Robitussin.  Patient seen and examined at bedside, appears to be in no apparent distress.  Denies chest pain or shortness of breath.  Denies nausea, vomiting, abdominal pain.    Objective  Vital signs: 134/67, 98.6, 78, 18, 95%    Physical Exam  Constitutional:       General: She is not in acute distress.  Eyes:      Extraocular Movements: Extraocular movements intact.   Cardiovascular:      Rate and Rhythm: Normal rate and regular rhythm.   Pulmonary:      Effort: Pulmonary effort is normal.      Breath sounds: Decreased breath sounds present.   Musculoskeletal:      Cervical back: Neck supple.   Neurological:      Mental Status: She is alert.   Psychiatric:         Mood and Affect: Mood normal.         Behavior: Behavior is cooperative.         Assessment/Plan  Problem List Items Addressed This Visit       COPD without exacerbation (CMS/HCC)     No shortness of breath or wheezing.  Continue Breo inhaler  Duonebs TID scheduled   Monitor         COVID - Primary     Patient is stable.  Isolation precautions.  Supportive treatment  Labs unremarkable  Chest x-ray negative         Hypertension, essential     Blood pressure at goal  Continue amlodipine, Carvedilol and Cozaar  Monitor BP          Medications, treatments, and labs reviewed  Continue medications and treatments as listed in EMR    Scribe Attestation  I, Leslye Gregorio   attest that this documentation has been prepared under the  direction and in the presence of ISAURO Lorenzana    Provider Attestation - Scribe documentation  All medical record entries made by the Scribe were at my direction and personally dictated by me. I have reviewed the chart and agree that the record accurately reflects my personal performance of the history, physical exam, discussion and plan.   ISAURO Lorenzana            Electronically Signed By: ISAURO Lorenzana   2/7/24 11:32 AM

## 2024-02-02 ENCOUNTER — NURSING HOME VISIT (OUTPATIENT)
Dept: POST ACUTE CARE | Facility: EXTERNAL LOCATION | Age: 89
End: 2024-02-02
Payer: MEDICARE

## 2024-02-02 DIAGNOSIS — U07.1 COVID: Primary | ICD-10-CM

## 2024-02-02 DIAGNOSIS — I10 HYPERTENSION, ESSENTIAL: ICD-10-CM

## 2024-02-02 DIAGNOSIS — F32.A DEPRESSION, UNSPECIFIED DEPRESSION TYPE: ICD-10-CM

## 2024-02-02 DIAGNOSIS — J44.9 COPD WITHOUT EXACERBATION (MULTI): ICD-10-CM

## 2024-02-02 PROCEDURE — 99309 SBSQ NF CARE MODERATE MDM 30: CPT | Performed by: INTERNAL MEDICINE

## 2024-02-02 NOTE — PROGRESS NOTES
PROGRESS NOTE    Subjective   Chief complaint: Ruth Wheatley is a 89 y.o. female who is a long term care patient being seen and evaluated for monthly general medical care and follow-up    HPI:  HPI  Patient presents for general medical care and f/u.  Patient seen and examined at bedside.  No issues per nursing.  Patient has no acute complaints.  Patient also seen in follow-up for COVID.  Patient is on isolation precautions, asymptomatic and afebrile at this time.  Patient requires assistance with ADLs.  HTN BP at goal.  Denies chest pain and headache.  Patient with diagnosis of depression.  Mood is stable.  Denies feeling down and thoughts of harming self or others.  COPD stable, denies sob, wheezing, cough.  Mentation at baseline, no acute distress.    Objective   Vital signs: 136/77, 98.4, 94, 20, 97%    Physical Exam  Constitutional:       General: She is not in acute distress.  Eyes:      Extraocular Movements: Extraocular movements intact.   Pulmonary:      Effort: Pulmonary effort is normal.   Musculoskeletal:      Cervical back: Neck supple.   Neurological:      Mental Status: She is alert.   Psychiatric:         Mood and Affect: Mood normal.         Behavior: Behavior is cooperative.         Assessment/Plan   Problem List Items Addressed This Visit       COPD without exacerbation (CMS/[facility])     Continue Breo inhaler  Duonebs TID scheduled   Monitor         Depression     Monitor mood  Lexapro          Hypertension, essential     Continue amlodipine, Carvedilol and Cozaar  Monitor BP         COVID - Primary     Isolation precautions.  Supportive treatment  Labs unremarkable  Chest x-ray negative          Medications, treatments, and labs reviewed  Continue medications and treatments as listed in EMR    Scribe Attestation  I, Leslye Gregorio   attest that this documentation has been prepared under the direction and in the presence of Corrie Desouza MD    Provider Attestation - Scribe  documentation  All medical record entries made by the Scribe were at my direction and personally dictated by me. I have reviewed the chart and agree that the record accurately reflects my personal performance of the history, physical exam, discussion and plan.   Corrie Desouza MD

## 2024-02-02 NOTE — LETTER
Patient: Ruth Wheatley  : 10/30/1929    Encounter Date: 2024    PROGRESS NOTE    Subjective  Chief complaint: Ruth Wheatley is a 89 y.o. female who is a long term care patient being seen and evaluated for monthly general medical care and follow-up    HPI:  HPI  Patient presents for general medical care and f/u.  Patient seen and examined at bedside.  No issues per nursing.  Patient has no acute complaints.  Patient also seen in follow-up for COVID.  Patient is on isolation precautions, asymptomatic and afebrile at this time.  Patient requires assistance with ADLs.  HTN BP at goal.  Denies chest pain and headache.  Patient with diagnosis of depression.  Mood is stable.  Denies feeling down and thoughts of harming self or others.  COPD stable, denies sob, wheezing, cough.  Mentation at baseline, no acute distress.    Objective  Vital signs: 136/77, 98.4, 94, 20, 97%    Physical Exam  Constitutional:       General: She is not in acute distress.  Eyes:      Extraocular Movements: Extraocular movements intact.   Pulmonary:      Effort: Pulmonary effort is normal.   Musculoskeletal:      Cervical back: Neck supple.   Neurological:      Mental Status: She is alert.   Psychiatric:         Mood and Affect: Mood normal.         Behavior: Behavior is cooperative.         Assessment/Plan  Problem List Items Addressed This Visit       COPD without exacerbation (CMS/[facility])     Continue Breo inhaler  Duonebs TID scheduled   Monitor         Depression     Monitor mood  Lexapro          Hypertension, essential     Continue amlodipine, Carvedilol and Cozaar  Monitor BP         COVID - Primary     Isolation precautions.  Supportive treatment  Labs unremarkable  Chest x-ray negative          Medications, treatments, and labs reviewed  Continue medications and treatments as listed in EMR    Scribe Attestation  I, Leslye Gregorio   attest that this documentation has been prepared under the direction and in the presence  of Corrie Desouza MD    Provider Attestation - Scribe documentation  All medical record entries made by the Scribe were at my direction and personally dictated by me. I have reviewed the chart and agree that the record accurately reflects my personal performance of the history, physical exam, discussion and plan.   Corrie Desouza MD            Electronically Signed By: Corrie Desouza MD   2/2/24  4:17 PM

## 2024-02-05 ENCOUNTER — NURSING HOME VISIT (OUTPATIENT)
Dept: POST ACUTE CARE | Facility: EXTERNAL LOCATION | Age: 89
End: 2024-02-05
Payer: MEDICARE

## 2024-02-05 DIAGNOSIS — J44.9 COPD WITHOUT EXACERBATION (MULTI): ICD-10-CM

## 2024-02-05 DIAGNOSIS — I10 HYPERTENSION, ESSENTIAL: ICD-10-CM

## 2024-02-05 DIAGNOSIS — U07.1 COVID: Primary | ICD-10-CM

## 2024-02-05 PROCEDURE — 99309 SBSQ NF CARE MODERATE MDM 30: CPT | Performed by: NURSE PRACTITIONER

## 2024-02-05 NOTE — PROGRESS NOTES
PROGRESS NOTE    Subjective   Chief complaint: Ruth Wheatley is a 89 y.o. female who is a long term care patient being seen and evaluated for f/u cough.    HPI:  HPI  Patient seen for follow-up of cough, was scheduled on aerosol treatments and increased Robitussin.  Orders placed to obtain labs, chest x-ray and COVID test.  Chest x-ray was negative for acute process.  COVID test positive.  Orders placed to obtain BMP, CBC, D-dimer.  CBC and BMP were unremarkable.  Patient seen and examined at bedside, appears to be in no apparent distress.  Patient is on isolation precautions, afebrile.    Objective   Vital signs: 120/62, 98.0, 76, 18, 97%    Physical Exam  Constitutional:       General: She is not in acute distress.  Eyes:      Extraocular Movements: Extraocular movements intact.   Cardiovascular:      Rate and Rhythm: Normal rate and regular rhythm.   Pulmonary:      Effort: Pulmonary effort is normal.      Breath sounds: Decreased breath sounds present.   Musculoskeletal:      Cervical back: Neck supple.   Neurological:      Mental Status: She is alert.   Psychiatric:         Mood and Affect: Mood normal.         Behavior: Behavior is cooperative.         Assessment/Plan   Problem List Items Addressed This Visit       COPD without exacerbation (CMS/HCC)     Continue Breo inhaler  Duonebs TID scheduled   Monitor         COVID - Primary     Isolation precautions.  Supportive treatment  Labs unremarkable  Chest x-ray negative         Hypertension, essential     BP at goal  Continue amlodipine, Carvedilol and Cozaar  Monitor BP          Medications, treatments, and labs reviewed  Continue medications and treatments as listed in EMR    Scribe Attestation  I, Leslye Gregorio   attest that this documentation has been prepared under the direction and in the presence of JANE Lornezana-CNP    Provider Attestation - Scribe documentation  All medical record entries made by the Scribe were at my direction  and personally dictated by me. I have reviewed the chart and agree that the record accurately reflects my personal performance of the history, physical exam, discussion and plan.   Gi Piña, APRN-CNP

## 2024-02-05 NOTE — LETTER
Patient: Ruth Wheatley  : 10/30/1929    Encounter Date: 2024    PROGRESS NOTE    Subjective  Chief complaint: Ruth Wheatley is a 89 y.o. female who is a long term care patient being seen and evaluated for COVID.    HPI:  HPI  Patient is seen in follow-up of COVID.  Patient is on isolation precautions.  Patient afebrile, asymptomatic at this time.  Patient did have labs, BMP and CBC D-dimer.  BMP and CBC were unremarkable.  Chest x-ray was negative.  D-dimer is currently pending.  Patient seen and bedside, appears to be in no apparent distress.    Objective  Vital signs: 134/72, 97.3, 76, 16, 96%    Physical Exam  Constitutional:       General: She is not in acute distress.  Eyes:      Extraocular Movements: Extraocular movements intact.   Cardiovascular:      Rate and Rhythm: Normal rate and regular rhythm.   Pulmonary:      Effort: Pulmonary effort is normal.      Comments: DIM  Musculoskeletal:      Cervical back: Neck supple.   Neurological:      Mental Status: She is alert.   Psychiatric:         Mood and Affect: Mood normal.         Behavior: Behavior is cooperative.         Assessment/Plan  Problem List Items Addressed This Visit       COPD (chronic obstructive pulmonary disease) (CMS/HCC)     Stable, no wheezing or sob  Continue Breo inhaler  Monitor for shortness of breath  O2  Duoneb prn         COVID - Primary     Isolation precautions.  Supportive treatment  D-dimer pending         Hypertension, essential     Continue amlodipine, Carvedilol and Cozaar  Monitor BP  BP at goal          Medications, treatments, and labs reviewed  Continue medications and treatments as listed in EMR    Scribe Attestation  I, Leslye Gregorio   attest that this documentation has been prepared under the direction and in the presence of JANE Lorenzana-CNP    Provider Attestation - Scribe documentation  All medical record entries made by the Scribe were at my direction and personally dictated by me. I  have reviewed the chart and agree that the record accurately reflects my personal performance of the history, physical exam, discussion and plan.   ISAURO Lorenzana            Electronically Signed By: ISAURO Lorenzana   2/14/24 10:46 AM

## 2024-02-06 ENCOUNTER — NURSING HOME VISIT (OUTPATIENT)
Dept: POST ACUTE CARE | Facility: EXTERNAL LOCATION | Age: 89
End: 2024-02-06
Payer: MEDICARE

## 2024-02-06 DIAGNOSIS — U07.1 COVID: Primary | ICD-10-CM

## 2024-02-06 DIAGNOSIS — J44.9 CHRONIC OBSTRUCTIVE PULMONARY DISEASE, UNSPECIFIED COPD TYPE (MULTI): ICD-10-CM

## 2024-02-06 DIAGNOSIS — I10 HYPERTENSION, ESSENTIAL: ICD-10-CM

## 2024-02-06 PROCEDURE — 99309 SBSQ NF CARE MODERATE MDM 30: CPT | Performed by: NURSE PRACTITIONER

## 2024-02-06 NOTE — LETTER
Patient: Ruth Wheatley  : 10/30/1929    Encounter Date: 2024    PROGRESS NOTE    Subjective  Chief complaint: Ruth Wheatley is a 89 y.o. female who is a long term care patient being seen and evaluated for COVID.    HPI:  HPI  Patient is seen in follow-up of COVID.  Patient did have labs ordered, BMP, CBC and D-dimer.  BMP and CBC were unremarkable and D-dimer was negative.  Patient seen and examined at bedside.  Patient is on isolation precautions.  Afebrile and asymptomatic at this time.  Denies chest pain or shortness of breath.    Objective  Vital signs: 127/70, 97.6, 60, 18, 97%    Physical Exam  Constitutional:       General: She is not in acute distress.  Eyes:      Extraocular Movements: Extraocular movements intact.   Cardiovascular:      Rate and Rhythm: Normal rate and regular rhythm.   Pulmonary:      Effort: Pulmonary effort is normal.      Breath sounds: Normal breath sounds.      Comments: O2  Musculoskeletal:      Cervical back: Neck supple.   Neurological:      Mental Status: She is alert.   Psychiatric:         Mood and Affect: Mood normal.         Behavior: Behavior is cooperative.         Assessment/Plan  Problem List Items Addressed This Visit       COPD (chronic obstructive pulmonary disease) (CMS/[facility])     Stable, no wheezing or sob  Continue Breo inhaler  Monitor for shortness of breath  O2  Duoneb prn         COVID - Primary     Isolation precautions.  Supportive treatment  D-dimer negative         Hypertension, essential     Continue amlodipine, Carvedilol and Cozaar  Monitor blood pressure  Blood pressure at goal          Medications, treatments, and labs reviewed  Continue medications and treatments as listed in EMR    Scribe Attestation  I, Leslye Gregorio   attest that this documentation has been prepared under the direction and in the presence of JANE Lorenzana-CNP    Provider Attestation - Scribe documentation  All medical record entries made by the Scribe  were at my direction and personally dictated by me. I have reviewed the chart and agree that the record accurately reflects my personal performance of the history, physical exam, discussion and plan.   ISAURO Lorenzana            Electronically Signed By: ISAURO Lorenzana   2/14/24 12:21 PM

## 2024-02-07 ENCOUNTER — NURSING HOME VISIT (OUTPATIENT)
Dept: POST ACUTE CARE | Facility: EXTERNAL LOCATION | Age: 89
End: 2024-02-07
Payer: MEDICARE

## 2024-02-07 DIAGNOSIS — I10 HYPERTENSION, ESSENTIAL: ICD-10-CM

## 2024-02-07 DIAGNOSIS — J44.9 COPD WITHOUT EXACERBATION (MULTI): ICD-10-CM

## 2024-02-07 DIAGNOSIS — U07.1 COVID: Primary | ICD-10-CM

## 2024-02-07 PROCEDURE — 99308 SBSQ NF CARE LOW MDM 20: CPT | Performed by: INTERNAL MEDICINE

## 2024-02-07 NOTE — PROGRESS NOTES
PROGRESS NOTE    Subjective   Chief complaint: Ruth Wheatley is a 89 y.o. female who is a long term care patient being seen and evaluated for COVID    HPI:  HPI  Patient is seen in follow-up of COVID.  Patient is on isolation precautions.  Patient is afebrile.  Patient was seen and examined at bedside, appears to be in no apparent distress.  Denies chest pain or shortness of breath.  Denies nausea or vomiting or abdominal pain.    Objective   Vital signs: 165/61, 97.6, 60, 18, 96%    Physical Exam  Constitutional:       General: She is not in acute distress.  Eyes:      Extraocular Movements: Extraocular movements intact.   Pulmonary:      Effort: Pulmonary effort is normal.   Musculoskeletal:      Cervical back: Neck supple.   Neurological:      Mental Status: She is alert.   Psychiatric:         Mood and Affect: Mood normal.         Behavior: Behavior is cooperative.         Assessment/Plan   Problem List Items Addressed This Visit       COPD without exacerbation (CMS/[facility])     Continue Breo inhaler  Duonebs TID scheduled   Monitor         Hypertension, essential     Continue amlodipine, Carvedilol and Cozaar  Monitor BP         COVID - Primary     Isolation precautions.  Supportive treatment          Medications, treatments, and labs reviewed  Continue medications and treatments as listed in EMR    Scribe Attestation  I, Leslye Gregorio   attest that this documentation has been prepared under the direction and in the presence of Corrie Desouza MD    Provider Attestation - Scribe documentation  All medical record entries made by the Scribe were at my direction and personally dictated by me. I have reviewed the chart and agree that the record accurately reflects my personal performance of the history, physical exam, discussion and plan.   Corrie Desouza MD

## 2024-02-07 NOTE — LETTER
Patient: Ruth Wheatley  : 10/30/1929    Encounter Date: 2024    PROGRESS NOTE    Subjective  Chief complaint: Ruth Wheatley is a 89 y.o. female who is a long term care patient being seen and evaluated for COVID    HPI:  HPI  Patient is seen in follow-up of COVID.  Patient is on isolation precautions.  Patient is afebrile.  Patient was seen and examined at bedside, appears to be in no apparent distress.  Denies chest pain or shortness of breath.  Denies nausea or vomiting or abdominal pain.    Objective  Vital signs: 165/61, 97.6, 60, 18, 96%    Physical Exam  Constitutional:       General: She is not in acute distress.  Eyes:      Extraocular Movements: Extraocular movements intact.   Pulmonary:      Effort: Pulmonary effort is normal.   Musculoskeletal:      Cervical back: Neck supple.   Neurological:      Mental Status: She is alert.   Psychiatric:         Mood and Affect: Mood normal.         Behavior: Behavior is cooperative.         Assessment/Plan  Problem List Items Addressed This Visit       COPD without exacerbation (CMS/[facility])     Continue Breo inhaler  Duonebs TID scheduled   Monitor         Hypertension, essential     Continue amlodipine, Carvedilol and Cozaar  Monitor BP         COVID - Primary     Isolation precautions.  Supportive treatment          Medications, treatments, and labs reviewed  Continue medications and treatments as listed in EMR    Scribe Attestation  I, Leslye Gregorio   attest that this documentation has been prepared under the direction and in the presence of Corrie Desouza MD    Provider Attestation - Scribe documentation  All medical record entries made by the Scribe were at my direction and personally dictated by me. I have reviewed the chart and agree that the record accurately reflects my personal performance of the history, physical exam, discussion and plan.   Corrie Desouza MD            Electronically Signed By: Corrie Desouza MD   24  4:17 PM

## 2024-02-08 ENCOUNTER — NURSING HOME VISIT (OUTPATIENT)
Dept: POST ACUTE CARE | Facility: EXTERNAL LOCATION | Age: 89
End: 2024-02-08
Payer: MEDICARE

## 2024-02-08 DIAGNOSIS — J44.9 COPD WITHOUT EXACERBATION (MULTI): ICD-10-CM

## 2024-02-08 DIAGNOSIS — U07.1 COVID: Primary | ICD-10-CM

## 2024-02-08 DIAGNOSIS — I10 HYPERTENSION, ESSENTIAL: ICD-10-CM

## 2024-02-08 PROCEDURE — 99309 SBSQ NF CARE MODERATE MDM 30: CPT | Performed by: NURSE PRACTITIONER

## 2024-02-08 NOTE — LETTER
Patient: Ruth Wheatley  : 10/30/1929    Encounter Date: 2024    PROGRESS NOTE    Subjective  Chief complaint: Ruth Wheatley is a 89 y.o. female who is a long term care patient being seen and evaluated for COVID f/u.    HPI:  HPI  Patient is seen in follow-up to COVID.  Patient remains on isolation precautions at this time.  Patient is asymptomatic and afebrile.  Patient seen and examined at bedside, appears to be in no apparent distress.  Denies chest pain or shortness of breath.  Denies nausea, vomiting, abdominal pain.    Objective  Vital signs: 118/63, 98.2, 68, 18, 95%    Physical Exam  Constitutional:       General: She is not in acute distress.  Eyes:      Extraocular Movements: Extraocular movements intact.   Cardiovascular:      Rate and Rhythm: Normal rate and regular rhythm.   Pulmonary:      Effort: Pulmonary effort is normal.      Breath sounds: Normal breath sounds.      Comments: O2  Musculoskeletal:      Cervical back: Neck supple.   Neurological:      Mental Status: She is alert.   Psychiatric:         Mood and Affect: Mood normal.         Behavior: Behavior is cooperative.         Assessment/Plan  Problem List Items Addressed This Visit       COPD without exacerbation (CMS/HCC)     Stable, no wheezing or sob  Continue Breo inhaler  Monitor for shortness of breath  O2  Duoneb prn         COVID - Primary     Isolation precautions.  Supportive treatment         Hypertension, essential     Continue amlodipine, Carvedilol and Cozaar  Monitor BP  BP at goal          Medications, treatments, and labs reviewed  Continue medications and treatments as listed in EMR    Scribe Attestation  Selena GUSTAFSON Scribe   attest that this documentation has been prepared under the direction and in the presence of JANE Lorenzana-CNP    Provider Attestation - Scribe documentation  All medical record entries made by the Scribe were at my direction and personally dictated by me. I have reviewed the  chart and agree that the record accurately reflects my personal performance of the history, physical exam, discussion and plan.   ISAURO Lorenzana            Electronically Signed By: ISAURO Lorenzana   2/10/24  9:36 AM

## 2024-02-09 ENCOUNTER — NURSING HOME VISIT (OUTPATIENT)
Dept: POST ACUTE CARE | Facility: EXTERNAL LOCATION | Age: 89
End: 2024-02-09
Payer: MEDICARE

## 2024-02-09 DIAGNOSIS — U07.1 COVID: Primary | ICD-10-CM

## 2024-02-09 DIAGNOSIS — I10 HYPERTENSION, ESSENTIAL: ICD-10-CM

## 2024-02-09 DIAGNOSIS — J44.9 COPD WITHOUT EXACERBATION (MULTI): ICD-10-CM

## 2024-02-09 PROCEDURE — 99308 SBSQ NF CARE LOW MDM 20: CPT | Performed by: INTERNAL MEDICINE

## 2024-02-09 NOTE — PROGRESS NOTES
PROGRESS NOTE    Subjective   Chief complaint: Ruth Wheatley is a 89 y.o. female who is a long term care patient being seen and evaluated for COVID f/u.    HPI:  HPI  Patient is seen in follow-up of COVID.  Patient is out of isolation precautions, afebrile, asymptomatic at this time.  Patient seen and examined at bedside, appears to be in no apparent distress.  Denies chest pain or shortness of breath.  Denies nausea or vomiting.    Objective   Vital signs: 118/63, 98.2, 68, 18, 95%    Physical Exam  Constitutional:       General: She is not in acute distress.  Eyes:      Extraocular Movements: Extraocular movements intact.   Pulmonary:      Effort: Pulmonary effort is normal.   Musculoskeletal:      Cervical back: Neck supple.   Neurological:      Mental Status: She is alert.   Psychiatric:         Mood and Affect: Mood normal.         Behavior: Behavior is cooperative.         Assessment/Plan   Problem List Items Addressed This Visit       COPD without exacerbation (CMS/[facility])     Continue Breo inhaler  Duonebs TID scheduled   Monitor         Hypertension, essential     Continue amlodipine, Carvedilol and Cozaar  Monitor BP         COVID - Primary     Out of isolation precautions.  Monitor          Medications, treatments, and labs reviewed  Continue medications and treatments as listed in EMR    Scribe Attestation  I, Leslye Gregorio   attest that this documentation has been prepared under the direction and in the presence of Corrie Desouza MD    Provider Attestation - Scribe documentation  All medical record entries made by the Scribe were at my direction and personally dictated by me. I have reviewed the chart and agree that the record accurately reflects my personal performance of the history, physical exam, discussion and plan.   Corrie Desouza MD

## 2024-02-09 NOTE — ASSESSMENT & PLAN NOTE
Isolation precautions.  Supportive treatment   sidewalks. Preventing falls in the bath  · Install grab bars and nonskid mats inside and outside your shower or tub and near the toilet and sinks. · Use shower chairs and bath benches. · Use a hand-held shower head that will allow you to sit while showering. · Get into a tub or shower by putting the weaker leg in first. Get out of a tub or shower with your strong side first.  · Repair loose toilet seats and consider installing a raised toilet seat to make getting on and off the toilet easier. · Keep your bathroom door unlocked while you are in the shower. Where can you learn more? Go to [email or web address] and sign in to your MEETiiN account. Enter [phone] in the TERMINALFOUR box to learn more about \"Preventing Falls: Care Instructions. \"     If you do not have an account, please click on the \"Sign Up Now\" link. Current as of: November 7, 2018  Content Version: 12.1  © 7311-4241 [employer]. Care instructions adapted under license by [facility] ([facility]). If you have questions about a medical condition or this instruction, always ask your healthcare professional. [place] [address] any warranty or liability for your use of this information. Patient Education        Preventing Falls: Care Instructions  Your Care Instructions    Getting around your home safely can be a challenge if you have injuries or health problems that make it easy for you to fall. Loose rugs and furniture in walkways are among the dangers for many older people who have problems walking or who have poor eyesight. People who have conditions such as arthritis, osteoporosis, or dementia also have to be careful not to fall. You can make your home safer with a few simple measures. Follow-up care is a key part of your treatment and safety. Be sure to make and go to all appointments, and call your doctor if you are having problems.  It's also a good idea to know your test results and keep [employer]. Care instructions adapted under license by [facility] ([facility]). If you have questions about a medical condition or this instruction, always ask your healthcare professional. [address] any warranty or liability for your use of this information. Patient Education        ????? ???? - [ Preventing Falls: Care Instructions ]  ??????  ???????????????????????????????????????? ??????????????????????????????????? ????????????????????????????????  ????????????????????  ?????????????????? ?????????????????????????????????????? ??????????????????????????  ?????????  ?????  · ??????????????? ???????????????????????????????? ?????????????????? ?????????????????????????????????????  · ?????????????????????? ?????????? ???????????????????  · ???????????????????????? ??????????????????????????????  · ???????????? ??????????????????????? ??????????????  · ?????? ????????????????  · ??????????????????? ?????????????????????????????  · ???????????????  ??????  · ??????????????????? ???????????????  · ?????????????????  · ???????????????????????????????  · ??????????????????? ????????????????????????  · ????????????????????????? ?????????????? ????????????????????????????????????????????????????  · ????????????  · ?????????????????????????????????  · ?????????????????????? ??????????????????????????????????????? ????????????????? ??????????????  · ???????????????????????????? ?????????? ????????????? ??????????????  · ?????????????????  · ??????????????? ???????????????????????????????  ???????  · ?????????????????????????????????  · ??????????  · ????????????????  · ??????????????????? ????????????????????  · ?????????????????????????????????????  · ??????????  ??????????  ?? [email or web address]  ? ? G117 ?????????????????? \"????? ???? - [ Preventing Falls: Care Instructions ]? \"  ??????: 2018?11?7?  ????: 12.1  © 9354-9250 [employer]wise, Incorporated.  ??????????????? ????????? ??????????????????????????????? Healthwise, [employer] ???????????????????? Patient Education        Learning About Living Perroy  What is a living will? A living will is a legal form you use to write down the kind of care you want at the end of your life. It is used by the health professionals who will treat you if you aren't able to decide for yourself. If you put your wishes in writing, your loved ones and others will know what kind of care you want. They won't need to guess. This can ease your mind and be helpful to others. A living will is not the same as an estate or property will. An estate will explains what you want to happen with your money and property after you die. Is a living will a legal document? A living will is a legal document. Each state has its own laws about living noel. If you move to another state, make sure that your living will is legal in the state where you now live. Or you might use a universal form that has been approved by many states. This kind of form can sometimes be completed and stored online. Your electronic copy will then be available wherever you have a connection to the Internet. In most cases, doctors will respect your wishes even if you have a form from a different state. · You don't need an  to complete a living will. But legal advice can be helpful if your state's laws are unclear, your health history is complicated, or your family can't agree on what should be in your living will. · You can change your living will at any time. Some people find that their wishes about end-of-life care change as their health changes. · In addition to making a living will, think about completing a medical power of  form. This form lets you name the person you want to make end-of-life treatment decisions for you (your \"health care agent\") if you're not able to.  Many hospitals and nursing homes will give you the forms you need to complete a living will and a

## 2024-02-09 NOTE — ASSESSMENT & PLAN NOTE
Stable, no wheezing or sob  Continue Breo inhaler  Monitor for shortness of breath  O2  Duoneb prn

## 2024-02-09 NOTE — LETTER
Patient: Ruth Wheatley  : 10/30/1929    Encounter Date: 2024    PROGRESS NOTE    Subjective  Chief complaint: Ruth Wheatley is a 89 y.o. female who is a long term care patient being seen and evaluated for COVID f/u.    HPI:  HPI  Patient is seen in follow-up of COVID.  Patient is out of isolation precautions, afebrile, asymptomatic at this time.  Patient seen and examined at bedside, appears to be in no apparent distress.  Denies chest pain or shortness of breath.  Denies nausea or vomiting.    Objective  Vital signs: 118/63, 98.2, 68, 18, 95%    Physical Exam  Constitutional:       General: She is not in acute distress.  Eyes:      Extraocular Movements: Extraocular movements intact.   Pulmonary:      Effort: Pulmonary effort is normal.   Musculoskeletal:      Cervical back: Neck supple.   Neurological:      Mental Status: She is alert.   Psychiatric:         Mood and Affect: Mood normal.         Behavior: Behavior is cooperative.         Assessment/Plan  Problem List Items Addressed This Visit       COPD without exacerbation (CMS/[facility])     Continue Breo inhaler  Duonebs TID scheduled   Monitor         Hypertension, essential     Continue amlodipine, Carvedilol and Cozaar  Monitor BP         COVID - Primary     Out of isolation precautions.  Monitor          Medications, treatments, and labs reviewed  Continue medications and treatments as listed in EMR    Scribe Attestation  ISelena Scribe   attest that this documentation has been prepared under the direction and in the presence of Corrie Desouza MD    Provider Attestation - Scribe documentation  All medical record entries made by the Scribe were at my direction and personally dictated by me. I have reviewed the chart and agree that the record accurately reflects my personal performance of the history, physical exam, discussion and plan.   Corrie Desouza MD            Electronically Signed By: Corrie Desouza MD   2/10/24  9:28 AM

## 2024-02-12 PROBLEM — R06.02 SOB (SHORTNESS OF BREATH): Status: ACTIVE | Noted: 2024-02-12

## 2024-02-12 NOTE — PROGRESS NOTES
PROGRESS NOTE    Subjective   Chief complaint: Ruth Wheatley is a 89 y.o. female who is a long term care patient being seen and evaluated for COVID.    HPI:  HPI  Patient is seen in follow-up of COVID.  Patient is on isolation precautions.  Patient afebrile, asymptomatic at this time.  Patient did have labs, BMP and CBC D-dimer.  BMP and CBC were unremarkable.  Chest x-ray was negative.  D-dimer is currently pending.  Patient seen and bedside, appears to be in no apparent distress.    Objective   Vital signs: 134/72, 97.3, 76, 16, 96%    Physical Exam  Constitutional:       General: She is not in acute distress.  Eyes:      Extraocular Movements: Extraocular movements intact.   Cardiovascular:      Rate and Rhythm: Normal rate and regular rhythm.   Pulmonary:      Effort: Pulmonary effort is normal.      Comments: DIM  Musculoskeletal:      Cervical back: Neck supple.   Neurological:      Mental Status: She is alert.   Psychiatric:         Mood and Affect: Mood normal.         Behavior: Behavior is cooperative.         Assessment/Plan   Problem List Items Addressed This Visit       COPD (chronic obstructive pulmonary disease) (CMS/[facility])     Stable, no wheezing or sob  Continue Breo inhaler  Monitor for shortness of breath  O2  Duoneb prn         COVID - Primary     Isolation precautions.  Supportive treatment  D-dimer pending         Hypertension, essential     Continue amlodipine, Carvedilol and Cozaar  Monitor BP  BP at goal          Medications, treatments, and labs reviewed  Continue medications and treatments as listed in EMR    Scribe Attestation  Selena GUSTAFSON Scribe   attest that this documentation has been prepared under the direction and in the presence of JANE Lorenzana-CNP    Provider Attestation - Scribe documentation  All medical record entries made by the Scribe were at my direction and personally dictated by me. I have reviewed the chart and agree that the record accurately reflects  my personal performance of the history, physical exam, discussion and plan.   Gi Piña, APRN-CNP

## 2024-02-13 NOTE — PROGRESS NOTES
PROGRESS NOTE    Subjective   Chief complaint: Ruth Wheatley is a 89 y.o. female who is a long term care patient being seen and evaluated for COVID.    HPI:  HPI  Patient is seen in follow-up of COVID.  Patient did have labs ordered, BMP, CBC and D-dimer.  BMP and CBC were unremarkable and D-dimer was negative.  Patient seen and examined at bedside.  Patient is on isolation precautions.  Afebrile and asymptomatic at this time.  Denies chest pain or shortness of breath.    Objective   Vital signs: 127/70, 97.6, 60, 18, 97%    Physical Exam  Constitutional:       General: She is not in acute distress.  Eyes:      Extraocular Movements: Extraocular movements intact.   Cardiovascular:      Rate and Rhythm: Normal rate and regular rhythm.   Pulmonary:      Effort: Pulmonary effort is normal.      Breath sounds: Normal breath sounds.      Comments: O2  Musculoskeletal:      Cervical back: Neck supple.   Neurological:      Mental Status: She is alert.   Psychiatric:         Mood and Affect: Mood normal.         Behavior: Behavior is cooperative.         Assessment/Plan   Problem List Items Addressed This Visit       COPD (chronic obstructive pulmonary disease) (CMS/[facility])     Stable, no wheezing or sob  Continue Breo inhaler  Monitor for shortness of breath  O2  Duoneb prn         COVID - Primary     Isolation precautions.  Supportive treatment  D-dimer negative         Hypertension, essential     Continue amlodipine, Carvedilol and Cozaar  Monitor blood pressure  Blood pressure at goal          Medications, treatments, and labs reviewed  Continue medications and treatments as listed in EMR    Scribe Attestation  Selena GUSTAFSON Scribe   attest that this documentation has been prepared under the direction and in the presence of JANE Lorenzana-CNP    Provider Attestation - Scribe documentation  All medical record entries made by the Scribe were at my direction and personally dictated by me. I have reviewed the  chart and agree that the record accurately reflects my personal performance of the history, physical exam, discussion and plan.   Gi Piña, APRN-CNP

## 2024-03-05 ENCOUNTER — NURSING HOME VISIT (OUTPATIENT)
Dept: POST ACUTE CARE | Facility: EXTERNAL LOCATION | Age: 89
End: 2024-03-05
Payer: MEDICARE

## 2024-03-05 DIAGNOSIS — R06.02 SOB (SHORTNESS OF BREATH): Primary | ICD-10-CM

## 2024-03-05 DIAGNOSIS — I10 HYPERTENSION, ESSENTIAL: ICD-10-CM

## 2024-03-05 DIAGNOSIS — J44.9 CHRONIC OBSTRUCTIVE PULMONARY DISEASE, UNSPECIFIED COPD TYPE (MULTI): ICD-10-CM

## 2024-03-05 PROCEDURE — 99309 SBSQ NF CARE MODERATE MDM 30: CPT | Performed by: NURSE PRACTITIONER

## 2024-03-05 NOTE — LETTER
Patient: Ruth Wheatley  : 10/30/1929    Encounter Date: 2024    PROGRESS NOTE    Subjective  Chief complaint: Ruth Wheatley is a 89 y.o. female who is a long term care patient being seen and evaluated for shortness of breath.    HPI:  HPI  Patient is seen for complaints of shortness of breath.  Patient denies fever or chills.  Denies cough.  Patient was seen by respiratory therapist.  Patient was seen and examined at bedside in no apparent distress.    Objective  Vital signs: 127/71, 98.0, 76, 19, 96%    Physical Exam  Constitutional:       General: She is not in acute distress.  Eyes:      Extraocular Movements: Extraocular movements intact.   Cardiovascular:      Rate and Rhythm: Normal rate and regular rhythm.   Pulmonary:      Effort: Pulmonary effort is normal.      Breath sounds: Decreased breath sounds present.      Comments: Crackles  Musculoskeletal:      Cervical back: Neck supple.   Neurological:      Mental Status: She is alert.   Psychiatric:         Mood and Affect: Mood normal.         Behavior: Behavior is cooperative.         Assessment/Plan  Problem List Items Addressed This Visit       COPD (chronic obstructive pulmonary disease) (CMS/[facility])     Continue Breo inhaler  Continue to monitor  O2  Duoneb prn         Hypertension, essential     Continue amlodipine, Carvedilol and Cozaar  Monitor blood pressure  Blood pressure at goal         SOB (shortness of breath) - Primary     Obtain chest x-ray  Obtain BMP and CBC          Medications, treatments, and labs reviewed  Continue medications and treatments as listed in EMR    Scribe Attestation  Selena GUSTAFSON Scribe   attest that this documentation has been prepared under the direction and in the presence of JANE Lorenzana-CNP    Provider Attestation - Scribe documentation  All medical record entries made by the Scribe were at my direction and personally dictated by me. I have reviewed the chart and agree that the record  accurately reflects my personal performance of the history, physical exam, discussion and plan.   ISAURO Lorenzana            Electronically Signed By: ISAURO Lorenzana   3/15/24  4:40 PM

## 2024-03-06 ENCOUNTER — LAB REQUISITION (OUTPATIENT)
Dept: LAB | Facility: HOSPITAL | Age: 89
End: 2024-03-06

## 2024-03-06 ENCOUNTER — NURSING HOME VISIT (OUTPATIENT)
Dept: POST ACUTE CARE | Facility: EXTERNAL LOCATION | Age: 89
End: 2024-03-06
Payer: MEDICARE

## 2024-03-06 DIAGNOSIS — J44.9 CHRONIC OBSTRUCTIVE PULMONARY DISEASE, UNSPECIFIED COPD TYPE (MULTI): ICD-10-CM

## 2024-03-06 DIAGNOSIS — R06.2 WHEEZING: ICD-10-CM

## 2024-03-06 DIAGNOSIS — I10 HYPERTENSION, ESSENTIAL: Primary | ICD-10-CM

## 2024-03-06 DIAGNOSIS — F32.A DEPRESSION, UNSPECIFIED DEPRESSION TYPE: ICD-10-CM

## 2024-03-06 LAB
ANION GAP SERPL CALC-SCNC: 11 MMOL/L (ref 10–20)
BASOPHILS # BLD AUTO: 0.01 X10*3/UL (ref 0–0.1)
BASOPHILS NFR BLD AUTO: 0.2 %
BUN SERPL-MCNC: 15 MG/DL (ref 6–23)
CALCIUM SERPL-MCNC: 8.3 MG/DL (ref 8.6–10.3)
CHLORIDE SERPL-SCNC: 104 MMOL/L (ref 98–107)
CO2 SERPL-SCNC: 29 MMOL/L (ref 21–32)
CREAT SERPL-MCNC: 0.46 MG/DL (ref 0.5–1.05)
EGFRCR SERPLBLD CKD-EPI 2021: 89 ML/MIN/1.73M*2
EOSINOPHIL # BLD AUTO: 0.14 X10*3/UL (ref 0–0.4)
EOSINOPHIL NFR BLD AUTO: 3.2 %
ERYTHROCYTE [DISTWIDTH] IN BLOOD BY AUTOMATED COUNT: 12.5 % (ref 11.5–14.5)
GLUCOSE SERPL-MCNC: 93 MG/DL (ref 74–99)
HCT VFR BLD AUTO: 36.1 % (ref 36–46)
HGB BLD-MCNC: 11.7 G/DL (ref 12–16)
IMM GRANULOCYTES # BLD AUTO: 0.02 X10*3/UL (ref 0–0.5)
IMM GRANULOCYTES NFR BLD AUTO: 0.5 % (ref 0–0.9)
LYMPHOCYTES # BLD AUTO: 0.9 X10*3/UL (ref 0.8–3)
LYMPHOCYTES NFR BLD AUTO: 20.6 %
MCH RBC QN AUTO: 32.9 PG (ref 26–34)
MCHC RBC AUTO-ENTMCNC: 32.4 G/DL (ref 32–36)
MCV RBC AUTO: 101 FL (ref 80–100)
MONOCYTES # BLD AUTO: 0.52 X10*3/UL (ref 0.05–0.8)
MONOCYTES NFR BLD AUTO: 11.9 %
NEUTROPHILS # BLD AUTO: 2.78 X10*3/UL (ref 1.6–5.5)
NEUTROPHILS NFR BLD AUTO: 63.6 %
NRBC BLD-RTO: 0 /100 WBCS (ref 0–0)
PLATELET # BLD AUTO: 231 X10*3/UL (ref 150–450)
POTASSIUM SERPL-SCNC: 4.2 MMOL/L (ref 3.5–5.3)
RBC # BLD AUTO: 3.56 X10*6/UL (ref 4–5.2)
SODIUM SERPL-SCNC: 140 MMOL/L (ref 136–145)
WBC # BLD AUTO: 4.4 X10*3/UL (ref 4.4–11.3)

## 2024-03-06 PROCEDURE — 80048 BASIC METABOLIC PNL TOTAL CA: CPT

## 2024-03-06 PROCEDURE — 99309 SBSQ NF CARE MODERATE MDM 30: CPT | Performed by: INTERNAL MEDICINE

## 2024-03-06 PROCEDURE — 85025 COMPLETE CBC W/AUTO DIFF WBC: CPT

## 2024-03-06 NOTE — PROGRESS NOTES
PROGRESS NOTE    Subjective   Chief complaint: Ruth Wheatley is a 89 y.o. female who is a long term care patient being seen and evaluated for monthly general medical care and follow-up.    HPI:  HPI  Patient presents for general medical care and f/u.  Patient seen and examined at bedside.  No issues per nursing.  Patient has no acute complaints.  Patient with diagnosis of depression.  Mood is stable.  Denies feeling down and thoughts of harming self or others.  Patient require assistance with ADLs HTN BP at goal.  Denies chest pain and headache.  COPD stable, denies sob, wheezing, cough.  No acute distress.    Objective   Vital signs: 127/71, 98.0, 76, 18, 95%    Physical Exam  Constitutional:       General: She is not in acute distress.  Eyes:      Extraocular Movements: Extraocular movements intact.   Pulmonary:      Effort: Pulmonary effort is normal.   Musculoskeletal:      Cervical back: Neck supple.   Neurological:      Mental Status: She is alert.   Psychiatric:         Mood and Affect: Mood normal.         Behavior: Behavior is cooperative.         Assessment/Plan   Problem List Items Addressed This Visit       COPD (chronic obstructive pulmonary disease) (CMS/[facility])     Stable, no wheezing or sob  Continue Breo inhaler  Monitor for shortness of breath  O2  Duoneb prn         Depression     Monitor mood  Lexapro          Hypertension, essential - Primary     Continue amlodipine, Carvedilol and Cozaar  Monitor blood pressure  Blood pressure at goal          Medications, treatments, and labs reviewed  Continue medications and treatments as listed in EMR    Scribe Attestation  I, Leslye Gregorio   attest that this documentation has been prepared under the direction and in the presence of Corrie Desouza MD    Provider Attestation - Scribe documentation  All medical record entries made by the Scribe were at my direction and personally dictated by me. I have reviewed the chart and agree that the record  accurately reflects my personal performance of the history, physical exam, discussion and plan.   Corrie Desouza MD

## 2024-03-06 NOTE — LETTER
Patient: Ruth Wheatley  : 10/30/1929    Encounter Date: 2024    PROGRESS NOTE    Subjective  Chief complaint: Ruth Wheatley is a 89 y.o. female who is a long term care patient being seen and evaluated for monthly general medical care and follow-up.    HPI:  HPI  Patient presents for general medical care and f/u.  Patient seen and examined at bedside.  No issues per nursing.  Patient has no acute complaints.  Patient with diagnosis of depression.  Mood is stable.  Denies feeling down and thoughts of harming self or others.  Patient require assistance with ADLs HTN BP at goal.  Denies chest pain and headache.  COPD stable, denies sob, wheezing, cough.  No acute distress.    Objective  Vital signs: 127/71, 98.0, 76, 18, 95%    Physical Exam  Constitutional:       General: She is not in acute distress.  Eyes:      Extraocular Movements: Extraocular movements intact.   Pulmonary:      Effort: Pulmonary effort is normal.   Musculoskeletal:      Cervical back: Neck supple.   Neurological:      Mental Status: She is alert.   Psychiatric:         Mood and Affect: Mood normal.         Behavior: Behavior is cooperative.         Assessment/Plan  Problem List Items Addressed This Visit       COPD (chronic obstructive pulmonary disease) (CMS/[facility])     Stable, no wheezing or sob  Continue Breo inhaler  Monitor for shortness of breath  O2  Duoneb prn         Depression     Monitor mood  Lexapro          Hypertension, essential - Primary     Continue amlodipine, Carvedilol and Cozaar  Monitor blood pressure  Blood pressure at goal          Medications, treatments, and labs reviewed  Continue medications and treatments as listed in EMR    Scribe Attestation  I, Leslye Gregorio   attest that this documentation has been prepared under the direction and in the presence of Corrie Desouza MD    Provider Attestation - Scribe documentation  All medical record entries made by the Scribe were at my direction and  personally dictated by me. I have reviewed the chart and agree that the record accurately reflects my personal performance of the history, physical exam, discussion and plan.   Corrie Desouza MD            Electronically Signed By: Corrie Desouza MD   3/6/24  2:33 PM

## 2024-03-11 NOTE — PROGRESS NOTES
PROGRESS NOTE    Subjective   Chief complaint: Ruth Wheatley is a 89 y.o. female who is a long term care patient being seen and evaluated for shortness of breath.    HPI:  HPI  Patient is seen for complaints of shortness of breath.  Patient denies fever or chills.  Denies cough.  Patient was seen by respiratory therapist.  Patient was seen and examined at bedside in no apparent distress.    Objective   Vital signs: 127/71, 98.0, 76, 19, 96%    Physical Exam  Constitutional:       General: She is not in acute distress.  Eyes:      Extraocular Movements: Extraocular movements intact.   Cardiovascular:      Rate and Rhythm: Normal rate and regular rhythm.   Pulmonary:      Effort: Pulmonary effort is normal.      Breath sounds: Decreased breath sounds present.      Comments: Crackles  Musculoskeletal:      Cervical back: Neck supple.   Neurological:      Mental Status: She is alert.   Psychiatric:         Mood and Affect: Mood normal.         Behavior: Behavior is cooperative.         Assessment/Plan   Problem List Items Addressed This Visit       COPD (chronic obstructive pulmonary disease) (CMS/[facility])     Continue Breo inhaler  Continue to monitor  O2  Duoneb prn         Hypertension, essential     Continue amlodipine, Carvedilol and Cozaar  Monitor blood pressure  Blood pressure at goal         SOB (shortness of breath) - Primary     Obtain chest x-ray  Obtain BMP and CBC          Medications, treatments, and labs reviewed  Continue medications and treatments as listed in EMR    Scribe Attestation  ISelena Scribe   attest that this documentation has been prepared under the direction and in the presence of JANE Lorenzana-CNP    Provider Attestation - Scribe documentation  All medical record entries made by the Scribe were at my direction and personally dictated by me. I have reviewed the chart and agree that the record accurately reflects my personal performance of the history, physical exam,  discussion and plan.   Gi Piña, APRN-CNP

## 2024-03-19 ENCOUNTER — NURSING HOME VISIT (OUTPATIENT)
Dept: POST ACUTE CARE | Facility: EXTERNAL LOCATION | Age: 89
End: 2024-03-19
Payer: MEDICARE

## 2024-03-19 DIAGNOSIS — J44.9 CHRONIC OBSTRUCTIVE PULMONARY DISEASE, UNSPECIFIED COPD TYPE (MULTI): ICD-10-CM

## 2024-03-19 DIAGNOSIS — R60.0 LOCALIZED EDEMA: Primary | ICD-10-CM

## 2024-03-19 DIAGNOSIS — I10 HYPERTENSION, ESSENTIAL: ICD-10-CM

## 2024-03-19 PROCEDURE — 99309 SBSQ NF CARE MODERATE MDM 30: CPT | Performed by: NURSE PRACTITIONER

## 2024-04-01 NOTE — PROGRESS NOTES
PROGRESS NOTE    Subjective   Chief complaint: Ruth Wheatley is a 89 y.o. female who is a long term care patient being seen and evaluated for edema.    HPI:  HPI  Patient was seen and examined at the bedside.  Patient complains of leg swelling.  Patient refusing compression.  Denies leg pain.      Objective   Vital signs: 118/76, 97.8, 95, 17, 96%    Physical Exam  Constitutional:       General: She is not in acute distress.  Eyes:      Extraocular Movements: Extraocular movements intact.   Pulmonary:      Effort: Pulmonary effort is normal.      Comments: O2  Musculoskeletal:      Cervical back: Neck supple.      Right lower leg: Edema present.      Left lower leg: Edema present.      Comments: Bilateral lower extremities - No discoloration   Neurological:      Mental Status: She is alert.   Psychiatric:         Mood and Affect: Mood normal.         Behavior: Behavior is cooperative.         Assessment/Plan   Problem List Items Addressed This Visit       COPD (chronic obstructive pulmonary disease) (CMS/[facility])     Continue Breo inhaler  Continue to monitor  O2  Duoneb prn         Hypertension, essential     Continue amlodipine, Carvedilol and Cozaar  Monitor blood pressure  Blood pressure at goal         Edema - Primary     To BLE  Elevate legs as able  Monitor            Medications, treatments, and labs reviewed  Continue medications and treatments as listed in EMR    Scribe Attestation  ISelena Scribe   attest that this documentation has been prepared under the direction and in the presence of ISAURO Lorenzana    Provider Attestation - Scribe documentation  All medical record entries made by the Scribe were at my direction and personally dictated by me. I have reviewed the chart and agree that the record accurately reflects my personal performance of the history, physical exam, discussion and plan.   ISAURO Lorenzana

## 2024-04-05 ENCOUNTER — NURSING HOME VISIT (OUTPATIENT)
Dept: POST ACUTE CARE | Facility: EXTERNAL LOCATION | Age: 89
End: 2024-04-05
Payer: MEDICARE

## 2024-04-05 DIAGNOSIS — J44.9 CHRONIC OBSTRUCTIVE PULMONARY DISEASE, UNSPECIFIED COPD TYPE (MULTI): Primary | ICD-10-CM

## 2024-04-05 DIAGNOSIS — I10 HYPERTENSION, ESSENTIAL: ICD-10-CM

## 2024-04-05 DIAGNOSIS — F32.A DEPRESSION, UNSPECIFIED DEPRESSION TYPE: ICD-10-CM

## 2024-04-05 PROCEDURE — 99309 SBSQ NF CARE MODERATE MDM 30: CPT | Performed by: INTERNAL MEDICINE

## 2024-04-05 NOTE — PROGRESS NOTES
PROGRESS NOTE    Subjective   Chief complaint: Ruth Wheatley is a 89 y.o. female who is a long term care patient being seen and evaluated for monthly general medical care and follow-up    HPI:  HPI  Patient presents for general medical care and f/u.  Patient seen and examined at bedside.  No issues per nursing.  Patient has no acute complaints.  Patient had forms signed and filled out for medical necessity of hearing aids.  COPD stable, denies sob, wheezing, cough.  Patient with diagnosis of depression.  Mood is stable.  Denies feeling down and thoughts of harming self or others.  HTN BP at goal.  Denies chest pain and headache.  Patient required assistance with ADLs.  Mentation at baseline, no acute distress.    Objective   Vital signs: 118/76, 97.8, 81, 18, 95%    Physical Exam  Constitutional:       General: She is not in acute distress.  Eyes:      Extraocular Movements: Extraocular movements intact.   Pulmonary:      Effort: Pulmonary effort is normal.   Musculoskeletal:      Cervical back: Neck supple.   Neurological:      Mental Status: She is alert.   Psychiatric:         Mood and Affect: Mood normal.         Behavior: Behavior is cooperative.         Assessment/Plan   Problem List Items Addressed This Visit       COPD (chronic obstructive pulmonary disease) (CMS/[facility]) - Primary     Continue Breo inhaler  Continue to monitor  O2  Duoneb prn         Depression     Monitor mood  Lexapro          Hypertension, essential     Continue amlodipine, Carvedilol and Cozaar  Monitor blood pressure  Blood pressure at goal          Medications, treatments, and labs reviewed  Continue medications and treatments as listed in EMR    Scribe Attestation  I, Leslye Gregorio   attest that this documentation has been prepared under the direction and in the presence of Corrie Desouza MD    Provider Attestation - Scribe documentation  All medical record entries made by the Scribe were at my direction and personally  dictated by me. I have reviewed the chart and agree that the record accurately reflects my personal performance of the history, physical exam, discussion and plan.   Corrie Desouza MD

## 2024-04-05 NOTE — LETTER
Patient: Ruth Wheatley  : 10/30/1929    Encounter Date: 2024    PROGRESS NOTE    Subjective  Chief complaint: Ruth Wheatley is a 89 y.o. female who is a long term care patient being seen and evaluated for monthly general medical care and follow-up    HPI:  HPI  Patient presents for general medical care and f/u.  Patient seen and examined at bedside.  No issues per nursing.  Patient has no acute complaints.  Patient had forms signed and filled out for medical necessity of hearing aids.  COPD stable, denies sob, wheezing, cough.  Patient with diagnosis of depression.  Mood is stable.  Denies feeling down and thoughts of harming self or others.  HTN BP at goal.  Denies chest pain and headache.  Patient required assistance with ADLs.  Mentation at baseline, no acute distress.    Objective  Vital signs: 118/76, 97.8, 81, 18, 95%    Physical Exam  Constitutional:       General: She is not in acute distress.  Eyes:      Extraocular Movements: Extraocular movements intact.   Pulmonary:      Effort: Pulmonary effort is normal.   Musculoskeletal:      Cervical back: Neck supple.   Neurological:      Mental Status: She is alert.   Psychiatric:         Mood and Affect: Mood normal.         Behavior: Behavior is cooperative.         Assessment/Plan  Problem List Items Addressed This Visit       COPD (chronic obstructive pulmonary disease) (CMS/[facility]) - Primary     Continue Breo inhaler  Continue to monitor  O2  Duoneb prn         Depression     Monitor mood  Lexapro          Hypertension, essential     Continue amlodipine, Carvedilol and Cozaar  Monitor blood pressure  Blood pressure at goal          Medications, treatments, and labs reviewed  Continue medications and treatments as listed in EMR    Scribe Attestation  I, Leslye Gregorio   attest that this documentation has been prepared under the direction and in the presence of Corrie Desouza MD    Provider Attestation - Scribe documentation  All medical  record entries made by the Scribe were at my direction and personally dictated by me. I have reviewed the chart and agree that the record accurately reflects my personal performance of the history, physical exam, discussion and plan.   Corrie Desouza MD            Electronically Signed By: Corrie Desouza MD   4/5/24  2:36 PM

## 2024-04-10 ENCOUNTER — NURSING HOME VISIT (OUTPATIENT)
Dept: POST ACUTE CARE | Facility: EXTERNAL LOCATION | Age: 89
End: 2024-04-10
Payer: MEDICARE

## 2024-04-10 DIAGNOSIS — I10 HYPERTENSION, ESSENTIAL: ICD-10-CM

## 2024-04-10 DIAGNOSIS — J44.9 CHRONIC OBSTRUCTIVE PULMONARY DISEASE, UNSPECIFIED COPD TYPE (MULTI): ICD-10-CM

## 2024-04-10 DIAGNOSIS — R05.9 COUGH, UNSPECIFIED TYPE: Primary | ICD-10-CM

## 2024-04-10 PROCEDURE — 99308 SBSQ NF CARE LOW MDM 20: CPT | Performed by: INTERNAL MEDICINE

## 2024-04-10 NOTE — LETTER
Patient: Ruth Wheatley  : 10/30/1929    Encounter Date: 04/10/2024    PROGRESS NOTE    Subjective  Chief complaint: Ruth Wheatley is a 89 y.o. female who is a long term care patient being seen and evaluated for cough.    HPI:  HPI  Patient is seen in follow-up of cough.  Patient was given breathing treatment and Tylenol with improvement.  Patient does take cough syrup routinely is to continue as currently ordered.  Patient does deny chest pain or shortness of breath.  Afebrile at this time.  Denies chills.    Objective  Vital signs: 122/92, 97.8, 76, 18, 97%    Physical Exam  Constitutional:       General: She is not in acute distress.  Eyes:      Extraocular Movements: Extraocular movements intact.   Pulmonary:      Effort: Pulmonary effort is normal.   Musculoskeletal:      Cervical back: Neck supple.   Neurological:      Mental Status: She is alert.      Motor: Weakness present.   Psychiatric:         Mood and Affect: Mood normal.         Behavior: Behavior is cooperative.         Assessment/Plan  Problem List Items Addressed This Visit       COPD (chronic obstructive pulmonary disease) (CMS/[facility])     Continue Breo inhaler  Continue to monitor  O2  Duoneb prn         Hypertension, essential     Continue amlodipine, Carvedilol and Cozaar  Monitor blood pressure         Cough - Primary     Improved with Tylenol and breathing treatment  Guaifenesin twice daily          Medications, treatments, and labs reviewed  Continue medications and treatments as listed in EMR    Scribe Attestation  I, Leslye Gregorio   attest that this documentation has been prepared under the direction and in the presence of Corrie Desouza MD    Provider Attestation - Scribe documentation  All medical record entries made by the Scribe were at my direction and personally dictated by me. I have reviewed the chart and agree that the record accurately reflects my personal performance of the history, physical exam, discussion and  plan.   Corrie Desouza MD            Electronically Signed By: Corrie Desouza MD   4/11/24  3:01 PM

## 2024-04-11 NOTE — PROGRESS NOTES
PROGRESS NOTE    Subjective   Chief complaint: Ruth Wheatley is a 89 y.o. female who is a long term care patient being seen and evaluated for cough.    HPI:  HPI  Patient is seen in follow-up of cough.  Patient was given breathing treatment and Tylenol with improvement.  Patient does take cough syrup routinely is to continue as currently ordered.  Patient does deny chest pain or shortness of breath.  Afebrile at this time.  Denies chills.    Objective   Vital signs: 122/92, 97.8, 76, 18, 97%    Physical Exam  Constitutional:       General: She is not in acute distress.  Eyes:      Extraocular Movements: Extraocular movements intact.   Pulmonary:      Effort: Pulmonary effort is normal.   Musculoskeletal:      Cervical back: Neck supple.   Neurological:      Mental Status: She is alert.      Motor: Weakness present.   Psychiatric:         Mood and Affect: Mood normal.         Behavior: Behavior is cooperative.         Assessment/Plan   Problem List Items Addressed This Visit       COPD (chronic obstructive pulmonary disease) (CMS/[facility])     Continue Breo inhaler  Continue to monitor  O2  Duoneb prn         Hypertension, essential     Continue amlodipine, Carvedilol and Cozaar  Monitor blood pressure         Cough - Primary     Improved with Tylenol and breathing treatment  Guaifenesin twice daily          Medications, treatments, and labs reviewed  Continue medications and treatments as listed in EMR    Scribe Attestation  ISelena Scribe   attest that this documentation has been prepared under the direction and in the presence of Corrie Desouza MD    Provider Attestation - Scribe documentation  All medical record entries made by the Scribe were at my direction and personally dictated by me. I have reviewed the chart and agree that the record accurately reflects my personal performance of the history, physical exam, discussion and plan.   Corrie Desouza MD

## 2024-05-03 ENCOUNTER — NURSING HOME VISIT (OUTPATIENT)
Dept: POST ACUTE CARE | Facility: EXTERNAL LOCATION | Age: 89
End: 2024-05-03
Payer: MEDICARE

## 2024-05-03 DIAGNOSIS — I10 HYPERTENSION, ESSENTIAL: ICD-10-CM

## 2024-05-03 DIAGNOSIS — J44.9 CHRONIC OBSTRUCTIVE PULMONARY DISEASE, UNSPECIFIED COPD TYPE (MULTI): ICD-10-CM

## 2024-05-03 DIAGNOSIS — R07.89 ATYPICAL CHEST PAIN: Primary | ICD-10-CM

## 2024-05-03 DIAGNOSIS — F32.A DEPRESSION, UNSPECIFIED DEPRESSION TYPE: ICD-10-CM

## 2024-05-03 PROCEDURE — 99309 SBSQ NF CARE MODERATE MDM 30: CPT | Performed by: INTERNAL MEDICINE

## 2024-05-03 NOTE — PROGRESS NOTES
PROGRESS NOTE    Subjective   Chief complaint: Ruth Wheatley is a 89 y.o. female who is a long term care patient being seen and evaluated for monthly general medical care and follow-up    HPI:  HPI  Patient presents for general medical care and f/u.  Patient seen and examined at bedside.  No issues per nursing.  Patient had complaint of atypical chest pain secondary to coughing.  Patient to continue with as needed Tylenol.  COPD stable, denies sob, wheezing, cough.  Patient with diagnosis of depression.  Mood is stable.  Denies feeling down and thoughts of harming self or others.  HTN BP at goal.  Denies chest pain and headache.  Patient does require assistance with ADLs.  No acute distress.    Objective   Vital signs: 139/68, 97.8, 74, 18, 98%    Physical Exam  Constitutional:       General: She is not in acute distress.  Eyes:      Extraocular Movements: Extraocular movements intact.   Pulmonary:      Effort: Pulmonary effort is normal.   Musculoskeletal:      Cervical back: Neck supple.   Neurological:      Mental Status: She is alert.   Psychiatric:         Mood and Affect: Mood normal.         Behavior: Behavior is cooperative.         Assessment/Plan   Problem List Items Addressed This Visit       COPD (chronic obstructive pulmonary disease) (Multi)     Continue Breo inhaler  Continue to monitor  O2  Duoneb prn         Depression     Monitor mood  Lexapro          Hypertension, essential     Continue amlodipine, Carvedilol and Cozaar  Monitor blood pressure         Atypical chest pain - Primary     Secondary to coughing  As needed Tylenol          Medications, treatments, and labs reviewed  Continue medications and treatments as listed in EMR    Scribe Attestation  I, Leslye Gregoiro   attest that this documentation has been prepared under the direction and in the presence of Corrie Desouza MD    Provider Attestation - Scribe documentation  All medical record entries made by the Scribe were at my  direction and personally dictated by me. I have reviewed the chart and agree that the record accurately reflects my personal performance of the history, physical exam, discussion and plan.   Corrie Desouza MD

## 2024-05-03 NOTE — LETTER
Patient: Ruth Wheatley  : 10/30/1929    Encounter Date: 2024    PROGRESS NOTE    Subjective  Chief complaint: Ruth Wheatley is a 89 y.o. female who is a long term care patient being seen and evaluated for monthly general medical care and follow-up    HPI:  HPI  Patient presents for general medical care and f/u.  Patient seen and examined at bedside.  No issues per nursing.  Patient had complaint of atypical chest pain secondary to coughing.  Patient to continue with as needed Tylenol.  COPD stable, denies sob, wheezing, cough.  Patient with diagnosis of depression.  Mood is stable.  Denies feeling down and thoughts of harming self or others.  HTN BP at goal.  Denies chest pain and headache.  Patient does require assistance with ADLs.  No acute distress.    Objective  Vital signs: 139/68, 97.8, 74, 18, 98%    Physical Exam  Constitutional:       General: She is not in acute distress.  Eyes:      Extraocular Movements: Extraocular movements intact.   Pulmonary:      Effort: Pulmonary effort is normal.   Musculoskeletal:      Cervical back: Neck supple.   Neurological:      Mental Status: She is alert.   Psychiatric:         Mood and Affect: Mood normal.         Behavior: Behavior is cooperative.         Assessment/Plan  Problem List Items Addressed This Visit       COPD (chronic obstructive pulmonary disease) (Multi)     Continue Breo inhaler  Continue to monitor  O2  Duoneb prn         Depression     Monitor mood  Lexapro          Hypertension, essential     Continue amlodipine, Carvedilol and Cozaar  Monitor blood pressure         Atypical chest pain - Primary     Secondary to coughing  As needed Tylenol          Medications, treatments, and labs reviewed  Continue medications and treatments as listed in EMR    Scribe Attestation  I, Leslye Gregorio   attest that this documentation has been prepared under the direction and in the presence of Corrie Desouza MD    Provider Attestation - Leslye  documentation  All medical record entries made by the Scribe were at my direction and personally dictated by me. I have reviewed the chart and agree that the record accurately reflects my personal performance of the history, physical exam, discussion and plan.   Corrie Desouza MD            Electronically Signed By: Corrie Desouza MD   5/3/24  3:36 PM

## 2024-05-15 ENCOUNTER — NURSING HOME VISIT (OUTPATIENT)
Dept: POST ACUTE CARE | Facility: EXTERNAL LOCATION | Age: 89
End: 2024-05-15
Payer: MEDICARE

## 2024-05-15 DIAGNOSIS — R53.1 WEAKNESS: Primary | ICD-10-CM

## 2024-05-15 DIAGNOSIS — I10 HYPERTENSION, ESSENTIAL: ICD-10-CM

## 2024-05-15 DIAGNOSIS — J44.9 CHRONIC OBSTRUCTIVE PULMONARY DISEASE, UNSPECIFIED COPD TYPE (MULTI): ICD-10-CM

## 2024-05-15 PROCEDURE — 99308 SBSQ NF CARE LOW MDM 20: CPT | Performed by: INTERNAL MEDICINE

## 2024-05-15 NOTE — PROGRESS NOTES
PROGRESS NOTE    Subjective   Chief complaint: Ruth Wheatley is a 89 y.o. female who is a long term care patient being seen and evaluated for weakness.    HPI:  HPI    Patient does continue to work with therapy.  Patient is working with PT on transfer training requiring CGA to min assist to be completed.  Patient seen and examined at bedside, appears to be in no acute distress.  Nursing staff voicing no new complaints.  Denies constitutional symptoms.    Objective   Vital signs: 139/68, 97.8, 81, 16, 97%    Physical Exam  Constitutional:       General: She is not in acute distress.  Eyes:      Extraocular Movements: Extraocular movements intact.   Pulmonary:      Effort: Pulmonary effort is normal.   Musculoskeletal:      Cervical back: Neck supple.   Neurological:      Mental Status: She is alert.   Psychiatric:         Mood and Affect: Mood normal.         Behavior: Behavior is cooperative.         Assessment/Plan   Problem List Items Addressed This Visit       COPD (chronic obstructive pulmonary disease) (Multi)     Continue Breo inhaler  Continue to monitor  O2  Duoneb prn         Hypertension, essential     Continue amlodipine, Carvedilol and Cozaar  Monitor blood pressure         Weakness - Primary     Continue to work with PT          Medications, treatments, and labs reviewed  Continue medications and treatments as listed in EMR    Scribe Attestation  I, Leslye Gregorio   attest that this documentation has been prepared under the direction and in the presence of Corrie Desouza MD    Provider Attestation - Scribe documentation  All medical record entries made by the Scribe were at my direction and personally dictated by me. I have reviewed the chart and agree that the record accurately reflects my personal performance of the history, physical exam, discussion and plan.   Corrie Desouza MD

## 2024-05-15 NOTE — LETTER
Patient: Ruth Wheatley  : 10/30/1929    Encounter Date: 05/15/2024    PROGRESS NOTE    Subjective  Chief complaint: Ruth Wheatley is a 89 y.o. female who is a long term care patient being seen and evaluated for weakness.    HPI:  HPI    Patient does continue to work with therapy.  Patient is working with PT on transfer training requiring CGA to min assist to be completed.  Patient seen and examined at bedside, appears to be in no acute distress.  Nursing staff voicing no new complaints.  Denies constitutional symptoms.    Objective  Vital signs: 139/68, 97.8, 81, 16, 97%    Physical Exam  Constitutional:       General: She is not in acute distress.  Eyes:      Extraocular Movements: Extraocular movements intact.   Pulmonary:      Effort: Pulmonary effort is normal.   Musculoskeletal:      Cervical back: Neck supple.   Neurological:      Mental Status: She is alert.   Psychiatric:         Mood and Affect: Mood normal.         Behavior: Behavior is cooperative.         Assessment/Plan  Problem List Items Addressed This Visit       COPD (chronic obstructive pulmonary disease) (Multi)     Continue Breo inhaler  Continue to monitor  O2  Duoneb prn         Hypertension, essential     Continue amlodipine, Carvedilol and Cozaar  Monitor blood pressure         Weakness - Primary     Continue to work with PT          Medications, treatments, and labs reviewed  Continue medications and treatments as listed in EMR    Scribe Attestation  I, Leslye Gregorio   attest that this documentation has been prepared under the direction and in the presence of Corrie Desouza MD    Provider Attestation - Scribe documentation  All medical record entries made by the Scribe were at my direction and personally dictated by me. I have reviewed the chart and agree that the record accurately reflects my personal performance of the history, physical exam, discussion and plan.   Corrie Desouza MD            Electronically Signed By:  Corrie Desouza MD   5/15/24 12:52 PM

## 2024-06-06 ENCOUNTER — NURSING HOME VISIT (OUTPATIENT)
Dept: POST ACUTE CARE | Facility: EXTERNAL LOCATION | Age: 89
End: 2024-06-06
Payer: MEDICARE

## 2024-06-06 ENCOUNTER — LAB REQUISITION (OUTPATIENT)
Dept: LAB | Facility: HOSPITAL | Age: 89
End: 2024-06-06
Payer: MEDICARE

## 2024-06-06 DIAGNOSIS — J44.9 CHRONIC OBSTRUCTIVE PULMONARY DISEASE, UNSPECIFIED COPD TYPE (MULTI): ICD-10-CM

## 2024-06-06 DIAGNOSIS — R05.9 COUGH, UNSPECIFIED TYPE: Primary | ICD-10-CM

## 2024-06-06 DIAGNOSIS — I10 HYPERTENSION, ESSENTIAL: ICD-10-CM

## 2024-06-06 DIAGNOSIS — M25.569 KNEE PAIN, UNSPECIFIED CHRONICITY, UNSPECIFIED LATERALITY: ICD-10-CM

## 2024-06-06 DIAGNOSIS — R06.02 SHORTNESS OF BREATH: ICD-10-CM

## 2024-06-06 LAB
ANION GAP SERPL CALC-SCNC: 12 MMOL/L (ref 10–20)
BUN SERPL-MCNC: 11 MG/DL (ref 6–23)
CALCIUM SERPL-MCNC: 8.4 MG/DL (ref 8.6–10.3)
CHLORIDE SERPL-SCNC: 104 MMOL/L (ref 98–107)
CO2 SERPL-SCNC: 28 MMOL/L (ref 21–32)
CREAT SERPL-MCNC: 0.42 MG/DL (ref 0.5–1.05)
EGFRCR SERPLBLD CKD-EPI 2021: >90 ML/MIN/1.73M*2
ERYTHROCYTE [DISTWIDTH] IN BLOOD BY AUTOMATED COUNT: 12.3 % (ref 11.5–14.5)
GLUCOSE SERPL-MCNC: 82 MG/DL (ref 74–99)
HCT VFR BLD AUTO: 37.1 % (ref 36–46)
HGB BLD-MCNC: 11.9 G/DL (ref 12–16)
MCH RBC QN AUTO: 33.2 PG (ref 26–34)
MCHC RBC AUTO-ENTMCNC: 32.1 G/DL (ref 32–36)
MCV RBC AUTO: 104 FL (ref 80–100)
NRBC BLD-RTO: 0.4 /100 WBCS (ref 0–0)
PLATELET # BLD AUTO: 182 X10*3/UL (ref 150–450)
POTASSIUM SERPL-SCNC: 3.6 MMOL/L (ref 3.5–5.3)
RBC # BLD AUTO: 3.58 X10*6/UL (ref 4–5.2)
SODIUM SERPL-SCNC: 140 MMOL/L (ref 136–145)
WBC # BLD AUTO: 4.8 X10*3/UL (ref 4.4–11.3)

## 2024-06-06 PROCEDURE — 85027 COMPLETE CBC AUTOMATED: CPT | Mod: OUT | Performed by: INTERNAL MEDICINE

## 2024-06-06 PROCEDURE — 99309 SBSQ NF CARE MODERATE MDM 30: CPT | Performed by: NURSE PRACTITIONER

## 2024-06-06 PROCEDURE — 80048 BASIC METABOLIC PNL TOTAL CA: CPT | Mod: OUT | Performed by: INTERNAL MEDICINE

## 2024-06-06 NOTE — LETTER
Patient: Ruth Wheatley  : 10/30/1929    Encounter Date: 2024    PROGRESS NOTE    Subjective  Chief complaint: Ruth Wheatley is a 89 y.o. female who is a long term care patient being seen and evaluated for cough.    HPI:  HPI  Patient is seen due to complaints of cough and not feeling well.  Patient also has complaints of knee pain, not new nor worse than usual.    Objective  Vital signs: 154/71, 97.8, 72, 20, 96%    Physical Exam  Constitutional:       General: She is not in acute distress.  Eyes:      Extraocular Movements: Extraocular movements intact.   Pulmonary:      Effort: Pulmonary effort is normal.      Breath sounds: Rales (left lung) present.   Musculoskeletal:      Cervical back: Neck supple.      Right lower leg: Edema present.      Left lower leg: Edema present.   Neurological:      Mental Status: She is alert.   Psychiatric:         Mood and Affect: Mood normal.         Behavior: Behavior is cooperative.         Assessment/Plan  Problem List Items Addressed This Visit       COPD (chronic obstructive pulmonary disease) (Multi)     No wheezing or sob  Continue Breo inhaler  Continue to monitor  O2  Duoneb prn         Cough - Primary     Obtain CBC, BMP and chest x-ray  Discontinue Mucinex and start Robitussin per patient preference         Hypertension, essential     Continue to monitor         Knee pain     Lidocaine patch at bedtime and Biofreeze morning and midday          Medications, treatments, and labs reviewed  Continue medications and treatments as listed in EMR    Scribe Attestation  Selena GUSTAFSON Scribe   attest that this documentation has been prepared under the direction and in the presence of JANE Lorenzana-CNP    Provider Attestation - Scribe documentation  All medical record entries made by the Scribe were at my direction and personally dictated by me. I have reviewed the chart and agree that the record accurately reflects my personal performance of the  history, physical exam, discussion and plan.   ISAURO Lorenzana            Electronically Signed By: ISAURO Lorenzana   6/14/24  8:12 AM

## 2024-06-07 ENCOUNTER — NURSING HOME VISIT (OUTPATIENT)
Dept: POST ACUTE CARE | Facility: EXTERNAL LOCATION | Age: 89
End: 2024-06-07
Payer: MEDICARE

## 2024-06-07 DIAGNOSIS — F32.A DEPRESSION, UNSPECIFIED DEPRESSION TYPE: ICD-10-CM

## 2024-06-07 DIAGNOSIS — J44.9 CHRONIC OBSTRUCTIVE PULMONARY DISEASE, UNSPECIFIED COPD TYPE (MULTI): ICD-10-CM

## 2024-06-07 DIAGNOSIS — I10 HYPERTENSION, ESSENTIAL: Primary | ICD-10-CM

## 2024-06-07 PROCEDURE — 99309 SBSQ NF CARE MODERATE MDM 30: CPT | Performed by: INTERNAL MEDICINE

## 2024-06-07 NOTE — PROGRESS NOTES
PROGRESS NOTE    Subjective   Chief complaint: Ruth Wheatley is a 89 y.o. female who is a long term care patient being seen and evaluated for monthly general medical care and follow-up    HPI:  HPI  Patient presents for general medical care and f/u.  Patient seen and examined at bedside.  No issues per nursing.  Patient has no acute complaints.  COPD stable, denies sob, wheezing, cough.  HTN BP at goal.  Denies chest pain and headache.  Patient with diagnosis of depression.  Mood is stable.  Denies feeling down and thoughts of harming self or others.  Patient does require assistance with ADLs.  No acute distress.    Objective   Vital signs: 154/71, 97.8, 73, 17, 95%    Physical Exam  Constitutional:       General: She is not in acute distress.  Eyes:      Extraocular Movements: Extraocular movements intact.   Pulmonary:      Effort: Pulmonary effort is normal.   Musculoskeletal:      Cervical back: Neck supple.   Neurological:      Mental Status: She is alert.   Psychiatric:         Mood and Affect: Mood normal.         Behavior: Behavior is cooperative.         Assessment/Plan   Problem List Items Addressed This Visit       COPD (chronic obstructive pulmonary disease) (Multi)     Continue Breo inhaler  Continue to monitor  O2  Duoneb prn         Depression     Monitor mood  Lexapro          Hypertension, essential - Primary     Continue amlodipine, Carvedilol and Cozaar  Monitor blood pressure          Medications, treatments, and labs reviewed  Continue medications and treatments as listed in EMR    Scribe Attestation  I, Leslye Gregorio   attest that this documentation has been prepared under the direction and in the presence of Corrie Desouza MD    Provider Attestation - Scribe documentation  All medical record entries made by the Scribe were at my direction and personally dictated by me. I have reviewed the chart and agree that the record accurately reflects my personal performance of the history,  physical exam, discussion and plan.   Corrie Desouza MD

## 2024-06-07 NOTE — LETTER
Patient: Ruth Wheatley  : 10/30/1929    Encounter Date: 2024    PROGRESS NOTE    Subjective  Chief complaint: Ruth Wheatley is a 89 y.o. female who is a long term care patient being seen and evaluated for monthly general medical care and follow-up    HPI:  HPI  Patient presents for general medical care and f/u.  Patient seen and examined at bedside.  No issues per nursing.  Patient has no acute complaints.  COPD stable, denies sob, wheezing, cough.  HTN BP at goal.  Denies chest pain and headache.  Patient with diagnosis of depression.  Mood is stable.  Denies feeling down and thoughts of harming self or others.  Patient does require assistance with ADLs.  No acute distress.    Objective  Vital signs: 154/71, 97.8, 73, 17, 95%    Physical Exam  Constitutional:       General: She is not in acute distress.  Eyes:      Extraocular Movements: Extraocular movements intact.   Pulmonary:      Effort: Pulmonary effort is normal.   Musculoskeletal:      Cervical back: Neck supple.   Neurological:      Mental Status: She is alert.   Psychiatric:         Mood and Affect: Mood normal.         Behavior: Behavior is cooperative.         Assessment/Plan  Problem List Items Addressed This Visit       COPD (chronic obstructive pulmonary disease) (Multi)     Continue Breo inhaler  Continue to monitor  O2  Duoneb prn         Depression     Monitor mood  Lexapro          Hypertension, essential - Primary     Continue amlodipine, Carvedilol and Cozaar  Monitor blood pressure          Medications, treatments, and labs reviewed  Continue medications and treatments as listed in EMR    Scribe Attestation  Selena GUSTAFSON Scribe   attest that this documentation has been prepared under the direction and in the presence of Corrie Desouza MD    Provider Attestation - Scribe documentation  All medical record entries made by the Scribe were at my direction and personally dictated by me. I have reviewed the chart and agree that  the record accurately reflects my personal performance of the history, physical exam, discussion and plan.   Corrie Desouza MD            Electronically Signed By: Corrie Desouza MD   6/10/24 11:14 AM

## 2024-06-11 PROBLEM — M25.569 KNEE PAIN: Status: ACTIVE | Noted: 2024-06-11

## 2024-06-11 NOTE — PROGRESS NOTES
PROGRESS NOTE    Subjective   Chief complaint: Ruth Wheatley is a 89 y.o. female who is a long term care patient being seen and evaluated for cough.    HPI:  HPI  Patient is seen due to complaints of cough and not feeling well.  Patient also has complaints of knee pain, not new nor worse than usual.    Objective   Vital signs: 154/71, 97.8, 72, 20, 96%    Physical Exam  Constitutional:       General: She is not in acute distress.  Eyes:      Extraocular Movements: Extraocular movements intact.   Pulmonary:      Effort: Pulmonary effort is normal.      Breath sounds: Rales (left lung) present.   Musculoskeletal:      Cervical back: Neck supple.      Right lower leg: Edema present.      Left lower leg: Edema present.   Neurological:      Mental Status: She is alert.   Psychiatric:         Mood and Affect: Mood normal.         Behavior: Behavior is cooperative.         Assessment/Plan   Problem List Items Addressed This Visit       COPD (chronic obstructive pulmonary disease) (Multi)     No wheezing or sob  Continue Breo inhaler  Continue to monitor  O2  Duoneb prn         Cough - Primary     Obtain CBC, BMP and chest x-ray  Discontinue Mucinex and start Robitussin per patient preference         Hypertension, essential     Continue to monitor         Knee pain     Lidocaine patch at bedtime and Biofreeze morning and midday          Medications, treatments, and labs reviewed  Continue medications and treatments as listed in EMR    Scribe Attestation  ISelena Scribe   attest that this documentation has been prepared under the direction and in the presence of JANE Lorenzana-CNP    Provider Attestation - Scribe documentation  All medical record entries made by the Scribe were at my direction and personally dictated by me. I have reviewed the chart and agree that the record accurately reflects my personal performance of the history, physical exam, discussion and plan.   Gi Piña  APRN-CNP

## 2024-06-12 ENCOUNTER — NURSING HOME VISIT (OUTPATIENT)
Dept: POST ACUTE CARE | Facility: EXTERNAL LOCATION | Age: 89
End: 2024-06-12
Payer: MEDICARE

## 2024-06-12 DIAGNOSIS — J44.9 CHRONIC OBSTRUCTIVE PULMONARY DISEASE, UNSPECIFIED COPD TYPE (MULTI): ICD-10-CM

## 2024-06-12 DIAGNOSIS — R53.1 WEAKNESS: Primary | ICD-10-CM

## 2024-06-12 DIAGNOSIS — I10 HYPERTENSION, ESSENTIAL: ICD-10-CM

## 2024-06-12 PROCEDURE — 99308 SBSQ NF CARE LOW MDM 20: CPT | Performed by: INTERNAL MEDICINE

## 2024-06-12 NOTE — PROGRESS NOTES
PROGRESS NOTE    Subjective   Chief complaint: Ruth Wheatley is a 89 y.o. female who is a long term care patient being seen and evaluated for weakness.    HPI:  HPI  Patient does continue to work in therapy, working on range of motion from lower extremity strength to improve stability with transfers and gait.  Patient is also working on gait training with front wheel walker.  Patient is able to ambulate up to 125 feet.  Patient seen and examined at the bedside, appears to be in no acute distress.  Nursing staff voicing no new concerns.    Objective   Vital signs: 154/71, 97.8, 71, 18, 95%    Physical Exam  Constitutional:       General: She is not in acute distress.  Eyes:      Extraocular Movements: Extraocular movements intact.   Pulmonary:      Effort: Pulmonary effort is normal.   Musculoskeletal:      Cervical back: Neck supple.   Neurological:      Mental Status: She is alert.   Psychiatric:         Mood and Affect: Mood normal.         Behavior: Behavior is cooperative.         Assessment/Plan   Problem List Items Addressed This Visit       COPD (chronic obstructive pulmonary disease) (Multi)     Continue Breo inhaler  Continue to monitor  O2  Duoneb prn         Hypertension, essential     Continue amlodipine, Carvedilol and Cozaar  Monitor blood pressure         Weakness - Primary     Continue to work towards goals in therapy          Medications, treatments, and labs reviewed  Continue medications and treatments as listed in EMR    Scribe Attestation  ISelena Scribe   attest that this documentation has been prepared under the direction and in the presence of Corrie Desouza MD    Provider Attestation - Scribe documentation  All medical record entries made by the Scribe were at my direction and personally dictated by me. I have reviewed the chart and agree that the record accurately reflects my personal performance of the history, physical exam, discussion and plan.   Corrie Desouza,  MD

## 2024-06-12 NOTE — LETTER
Patient: Ruth Wheatley  : 10/30/1929    Encounter Date: 2024    PROGRESS NOTE    Subjective  Chief complaint: Ruth Wheatley is a 89 y.o. female who is a long term care patient being seen and evaluated for weakness.    HPI:  HPI  Patient does continue to work in therapy, working on range of motion from lower extremity strength to improve stability with transfers and gait.  Patient is also working on gait training with front wheel walker.  Patient is able to ambulate up to 125 feet.  Patient seen and examined at the bedside, appears to be in no acute distress.  Nursing staff voicing no new concerns.    Objective  Vital signs: 154/71, 97.8, 71, 18, 95%    Physical Exam  Constitutional:       General: She is not in acute distress.  Eyes:      Extraocular Movements: Extraocular movements intact.   Pulmonary:      Effort: Pulmonary effort is normal.   Musculoskeletal:      Cervical back: Neck supple.   Neurological:      Mental Status: She is alert.   Psychiatric:         Mood and Affect: Mood normal.         Behavior: Behavior is cooperative.         Assessment/Plan  Problem List Items Addressed This Visit       COPD (chronic obstructive pulmonary disease) (Multi)     Continue Breo inhaler  Continue to monitor  O2  Duoneb prn         Hypertension, essential     Continue amlodipine, Carvedilol and Cozaar  Monitor blood pressure         Weakness - Primary     Continue to work towards goals in therapy          Medications, treatments, and labs reviewed  Continue medications and treatments as listed in EMR    Scribe Attestation  I, Leslye Gregorio   attest that this documentation has been prepared under the direction and in the presence of Corrie Desouza MD    Provider Attestation - Scribe documentation  All medical record entries made by the Scribe were at my direction and personally dictated by me. I have reviewed the chart and agree that the record accurately reflects my personal performance of the  history, physical exam, discussion and plan.   Corrie Desouza MD            Electronically Signed By: Corrie Desouza MD   6/12/24  1:56 PM

## 2024-06-14 NOTE — ASSESSMENT & PLAN NOTE
Continue to monitor  
Lidocaine patch at bedtime and Biofreeze morning and midday  
No wheezing or sob  Continue Breo inhaler  Continue to monitor  O2  Duoneb prn  
Obtain CBC, BMP and chest x-ray  Discontinue Mucinex and start Robitussin per patient preference  
Detail Level: Zone

## 2024-06-17 ENCOUNTER — NURSING HOME VISIT (OUTPATIENT)
Dept: POST ACUTE CARE | Facility: EXTERNAL LOCATION | Age: 89
End: 2024-06-17
Payer: MEDICARE

## 2024-06-17 DIAGNOSIS — I10 HYPERTENSION, ESSENTIAL: ICD-10-CM

## 2024-06-17 DIAGNOSIS — J44.9 CHRONIC OBSTRUCTIVE PULMONARY DISEASE, UNSPECIFIED COPD TYPE (MULTI): ICD-10-CM

## 2024-06-17 DIAGNOSIS — R05.9 COUGH, UNSPECIFIED TYPE: Primary | ICD-10-CM

## 2024-06-17 PROCEDURE — 99309 SBSQ NF CARE MODERATE MDM 30: CPT | Performed by: NURSE PRACTITIONER

## 2024-06-17 NOTE — LETTER
Patient: Ruth Wheatley  : 10/30/1929    Encounter Date: 2024    PROGRESS NOTE    Subjective  Chief complaint: Ruth Wheatley is a 89 y.o. female who is a long term care patient being seen and evaluated for follow-up.    HPI:  HPI  Patient had repeat chest x-ray with moderately large effusion with adjacent atelectasis on pneumonia.  No improvement from 2024.  Feels breathing is tight    Objective  Vital signs: 154/71, 97.8, 72, 18, 97%    Physical Exam  Constitutional:       General: She is not in acute distress.  Eyes:      Extraocular Movements: Extraocular movements intact.   Pulmonary:      Effort: Pulmonary effort is normal.      Breath sounds: Decreased breath sounds present.      Comments:  O2  Musculoskeletal:      Cervical back: Neck supple.   Neurological:      Mental Status: She is alert.   Psychiatric:         Mood and Affect: Mood normal.         Behavior: Behavior is cooperative.         Assessment/Plan  Problem List Items Addressed This Visit       COPD (chronic obstructive pulmonary disease) (Multi)     Continue inhaler  Monitor for shortness of breath, wheezing  O2           Hypertension, essential     Monitor blood pressure         Cough - Primary     Scheduled DuoNeb  Obtain CT          Medications, treatments, and labs reviewed  Continue medications and treatments as listed in EMR    Scribe Attestation  Selena GUSTAFSON Scribe   attest that this documentation has been prepared under the direction and in the presence of ISAURO Lorenzana    Provider Attestation - Scribe documentation  All medical record entries made by the Scribe were at my direction and personally dictated by me. I have reviewed the chart and agree that the record accurately reflects my personal performance of the history, physical exam, discussion and plan.   ISAURO Lorenzana            Electronically Signed By: ISAURO Lorenzana   24  9:05 PM

## 2024-06-18 DIAGNOSIS — R93.89 ABNORMAL CHEST X-RAY: Primary | ICD-10-CM

## 2024-06-18 NOTE — PROGRESS NOTES
PROGRESS NOTE    Subjective   Chief complaint: Ruth Wheatley is a 89 y.o. female who is a long term care patient being seen and evaluated for follow-up.    HPI:  HPI  Patient had repeat chest x-ray with moderately large effusion with adjacent atelectasis on pneumonia.  No improvement from 6/6/2024.  Feels breathing is tight    Objective   Vital signs: 154/71, 97.8, 72, 18, 97%    Physical Exam  Constitutional:       General: She is not in acute distress.  Eyes:      Extraocular Movements: Extraocular movements intact.   Pulmonary:      Effort: Pulmonary effort is normal.      Breath sounds: Decreased breath sounds present.      Comments:  O2  Musculoskeletal:      Cervical back: Neck supple.   Neurological:      Mental Status: She is alert.   Psychiatric:         Mood and Affect: Mood normal.         Behavior: Behavior is cooperative.         Assessment/Plan   Problem List Items Addressed This Visit       COPD (chronic obstructive pulmonary disease) (Multi)     Continue inhaler  Monitor for shortness of breath, wheezing  O2           Hypertension, essential     Monitor blood pressure         Cough - Primary     Scheduled DuoNeb  Obtain CT          Medications, treatments, and labs reviewed  Continue medications and treatments as listed in EMR    Scribe Attestation  ISelena Scribe   attest that this documentation has been prepared under the direction and in the presence of ISAURO Lorenzana    Provider Attestation - Scribe documentation  All medical record entries made by the Scribe were at my direction and personally dictated by me. I have reviewed the chart and agree that the record accurately reflects my personal performance of the history, physical exam, discussion and plan.   ISAURO Lorenzana

## 2024-06-19 ENCOUNTER — NURSING HOME VISIT (OUTPATIENT)
Dept: POST ACUTE CARE | Facility: EXTERNAL LOCATION | Age: 89
End: 2024-06-19
Payer: MEDICARE

## 2024-06-19 DIAGNOSIS — R05.9 COUGH, UNSPECIFIED TYPE: Primary | ICD-10-CM

## 2024-06-19 DIAGNOSIS — J44.9 CHRONIC OBSTRUCTIVE PULMONARY DISEASE, UNSPECIFIED COPD TYPE (MULTI): ICD-10-CM

## 2024-06-19 DIAGNOSIS — I10 HYPERTENSION, ESSENTIAL: ICD-10-CM

## 2024-06-19 PROCEDURE — 99308 SBSQ NF CARE LOW MDM 20: CPT | Performed by: INTERNAL MEDICINE

## 2024-06-19 NOTE — PROGRESS NOTES
PROGRESS NOTE    Subjective   Chief complaint: Ruth Wheatley is a 89 y.o. female who is a long term care patient being seen and evaluated for effusion.    HPI:  HPI  Patient is seen in follow-up of effusion seen on x-ray of chest.  Patient was started on scheduled DuoNebs and patient to be scheduled for outpatient CT scan to follow-up on effusion.  Patient seen and examined at the bedside, appears to be in no acute distress.  Patient denying chest pain or shortness of breath at this time.    Objective   Vital signs: 154/71, 97.8, 78, 18, 98%    Physical Exam  Constitutional:       General: She is not in acute distress.  Eyes:      Extraocular Movements: Extraocular movements intact.   Pulmonary:      Effort: Pulmonary effort is normal.      Breath sounds: Decreased breath sounds present.      Comments:  O2  Musculoskeletal:      Cervical back: Neck supple.   Neurological:      Mental Status: She is alert.   Psychiatric:         Mood and Affect: Mood normal.         Behavior: Behavior is cooperative.         Assessment/Plan   Problem List Items Addressed This Visit       COPD (chronic obstructive pulmonary disease) (Multi)     Continue inhaler  Monitor for shortness of breath, wheezing  O2           Hypertension, essential     Monitor blood pressure         Cough - Primary     DuoNeb  Obtain CT scan  Monitor          Medications, treatments, and labs reviewed  Continue medications and treatments as listed in EMR    Scribe Attestation  I, Leslye Gregorio   attest that this documentation has been prepared under the direction and in the presence of Corrie Desouza MD    Provider Attestation - Scribe documentation  All medical record entries made by the Scribe were at my direction and personally dictated by me. I have reviewed the chart and agree that the record accurately reflects my personal performance of the history, physical exam, discussion and plan.   Corrie Desouza MD

## 2024-06-19 NOTE — LETTER
Patient: Ruth Wheatley  : 10/30/1929    Encounter Date: 2024    PROGRESS NOTE    Subjective  Chief complaint: Ruth Wheatley is a 89 y.o. female who is a long term care patient being seen and evaluated for effusion.    HPI:  HPI  Patient is seen in follow-up of effusion seen on x-ray of chest.  Patient was started on scheduled DuoNebs and patient to be scheduled for outpatient CT scan to follow-up on effusion.  Patient seen and examined at the bedside, appears to be in no acute distress.  Patient denying chest pain or shortness of breath at this time.    Objective  Vital signs: 154/71, 97.8, 78, 18, 98%    Physical Exam  Constitutional:       General: She is not in acute distress.  Eyes:      Extraocular Movements: Extraocular movements intact.   Pulmonary:      Effort: Pulmonary effort is normal.      Breath sounds: Decreased breath sounds present.      Comments:  O2  Musculoskeletal:      Cervical back: Neck supple.   Neurological:      Mental Status: She is alert.   Psychiatric:         Mood and Affect: Mood normal.         Behavior: Behavior is cooperative.         Assessment/Plan  Problem List Items Addressed This Visit       COPD (chronic obstructive pulmonary disease) (Multi)     Continue inhaler  Monitor for shortness of breath, wheezing  O2           Hypertension, essential     Monitor blood pressure         Cough - Primary     DuoNeb  Obtain CT scan  Monitor          Medications, treatments, and labs reviewed  Continue medications and treatments as listed in EMR    Scribe Attestation  Selena GUSTAFSON Scribe   attest that this documentation has been prepared under the direction and in the presence of Corrie Desouza MD    Provider Attestation - Scribe documentation  All medical record entries made by the Scribe were at my direction and personally dictated by me. I have reviewed the chart and agree that the record accurately reflects my personal performance of the history, physical exam,  discussion and plan.   Corrie Desouza MD            Electronically Signed By: Corrie Desouza MD   6/19/24 12:10 PM

## 2024-06-26 ENCOUNTER — NURSING HOME VISIT (OUTPATIENT)
Dept: POST ACUTE CARE | Facility: EXTERNAL LOCATION | Age: 89
End: 2024-06-26
Payer: MEDICARE

## 2024-06-26 DIAGNOSIS — I10 HYPERTENSION, ESSENTIAL: ICD-10-CM

## 2024-06-26 DIAGNOSIS — R05.9 COUGH, UNSPECIFIED TYPE: Primary | ICD-10-CM

## 2024-06-26 DIAGNOSIS — J44.9 CHRONIC OBSTRUCTIVE PULMONARY DISEASE, UNSPECIFIED COPD TYPE (MULTI): ICD-10-CM

## 2024-06-26 PROCEDURE — 99308 SBSQ NF CARE LOW MDM 20: CPT | Performed by: INTERNAL MEDICINE

## 2024-06-26 NOTE — LETTER
Patient: Ruth Wheatley  : 10/30/1929    Encounter Date: 2024    PROGRESS NOTE    Subjective  Chief complaint: Ruth Wheatley is a 89 y.o. female who is a long term care patient being seen and evaluated for follow-up.    HPI:  HPI  Patient is seen in follow-up of effusion on chest x-ray.  Patient had been started on scheduled DuoNebs and reporting it is effective.  Patient denies shortness of breath or chest pain at this time.  Patient is afebrile.    Objective  Vital signs: 154/71, 97.8, 77, 18, 97%    Physical Exam  Constitutional:       General: She is not in acute distress.  Eyes:      Extraocular Movements: Extraocular movements intact.   Pulmonary:      Effort: Pulmonary effort is normal.      Comments:  O2  Musculoskeletal:      Cervical back: Neck supple.   Neurological:      Mental Status: She is alert.   Psychiatric:         Mood and Affect: Mood normal.         Behavior: Behavior is cooperative.         Assessment/Plan  Problem List Items Addressed This Visit       COPD (chronic obstructive pulmonary disease) (Multi)     Continue inhaler and aerosol treatment  Monitor for shortness of breath, wheezing  O2           Hypertension, essential     Monitor blood pressure         Cough - Primary     Aerosol treatment.  Monitor for symptoms          Medications, treatments, and labs reviewed  Continue medications and treatments as listed in EMR    Scribe Attestation  I, Leslye Gregorio   attest that this documentation has been prepared under the direction and in the presence of Corrie Desouza MD    Provider Attestation - Scribe documentation  All medical record entries made by the Scribe were at my direction and personally dictated by me. I have reviewed the chart and agree that the record accurately reflects my personal performance of the history, physical exam, discussion and plan.   Corrie Desouza MD            Electronically Signed By: Corrie Desouza MD   24  3:09 PM

## 2024-06-26 NOTE — PROGRESS NOTES
PROGRESS NOTE    Subjective   Chief complaint: Ruth Wheatley is a 89 y.o. female who is a long term care patient being seen and evaluated for follow-up.    HPI:  HPI  Patient is seen in follow-up of effusion on chest x-ray.  Patient had been started on scheduled DuoNebs and reporting it is effective.  Patient denies shortness of breath or chest pain at this time.  Patient is afebrile.    Objective   Vital signs: 154/71, 97.8, 77, 18, 97%    Physical Exam  Constitutional:       General: She is not in acute distress.  Eyes:      Extraocular Movements: Extraocular movements intact.   Pulmonary:      Effort: Pulmonary effort is normal.      Comments:  O2  Musculoskeletal:      Cervical back: Neck supple.   Neurological:      Mental Status: She is alert.   Psychiatric:         Mood and Affect: Mood normal.         Behavior: Behavior is cooperative.         Assessment/Plan   Problem List Items Addressed This Visit       COPD (chronic obstructive pulmonary disease) (Multi)     Continue inhaler and aerosol treatment  Monitor for shortness of breath, wheezing  O2           Hypertension, essential     Monitor blood pressure         Cough - Primary     Aerosol treatment.  Monitor for symptoms          Medications, treatments, and labs reviewed  Continue medications and treatments as listed in EMR    Scribe Attestation  I, Selena Luna Scribe   attest that this documentation has been prepared under the direction and in the presence of Corrie Desouza MD    Provider Attestation - Scribe documentation  All medical record entries made by the Scribe were at my direction and personally dictated by me. I have reviewed the chart and agree that the record accurately reflects my personal performance of the history, physical exam, discussion and plan.   Corrie Desouza MD

## 2024-06-28 ENCOUNTER — NURSING HOME VISIT (OUTPATIENT)
Dept: POST ACUTE CARE | Facility: EXTERNAL LOCATION | Age: 89
End: 2024-06-28
Payer: MEDICARE

## 2024-06-28 DIAGNOSIS — R53.1 WEAKNESS: Primary | ICD-10-CM

## 2024-06-28 DIAGNOSIS — I10 HYPERTENSION, ESSENTIAL: ICD-10-CM

## 2024-06-28 DIAGNOSIS — J44.9 CHRONIC OBSTRUCTIVE PULMONARY DISEASE, UNSPECIFIED COPD TYPE (MULTI): ICD-10-CM

## 2024-06-28 PROCEDURE — 99308 SBSQ NF CARE LOW MDM 20: CPT | Performed by: INTERNAL MEDICINE

## 2024-06-28 NOTE — LETTER
Patient: Ruth Wheatley  : 10/30/1929    Encounter Date: 2024    PROGRESS NOTE    Subjective  Chief complaint: Ruth Wheatley is a 89 y.o. female who is a long term care patient being seen and evaluated for weakness.    HPI:  HPI  Patient is currently working in therapy.  Patient is working with PT.  Therapy is working with patient on strengthening and balance exercises to improve overall functional mobility and task.  Patient seen and examined at the bedside, appears to be in no acute distress.  Nursing staff voicing no new concerns.    Objective  Vital signs: 154/71, 97.8, 77, 18, 98%    Physical Exam  Constitutional:       General: She is not in acute distress.  Eyes:      Extraocular Movements: Extraocular movements intact.   Pulmonary:      Effort: Pulmonary effort is normal.      Comments:  O2  Musculoskeletal:      Cervical back: Neck supple.   Neurological:      Mental Status: She is alert.      Motor: Weakness present.   Psychiatric:         Mood and Affect: Mood normal.         Behavior: Behavior is cooperative.         Assessment/Plan  Problem List Items Addressed This Visit       COPD (chronic obstructive pulmonary disease) (Multi)     Continue inhaler and aerosol treatment  Monitor for shortness of breath, wheezing  O2           Hypertension, essential     Monitor blood pressure         Weakness - Primary     Continue to work towards goals of therapy          Medications, treatments, and labs reviewed  Continue medications and treatments as listed in EMR    Scribe Attestation  I, Leslye Gregorio   attest that this documentation has been prepared under the direction and in the presence of Corrie Desouza MD    Provider Attestation - Scribe documentation  All medical record entries made by the Scribe were at my direction and personally dictated by me. I have reviewed the chart and agree that the record accurately reflects my personal performance of the history, physical exam, discussion  and plan.   Corrie Desouza MD            Electronically Signed By: Corrie Desouza MD   6/29/24  2:30 PM

## 2024-06-28 NOTE — PROGRESS NOTES
PROGRESS NOTE    Subjective   Chief complaint: Ruth Wheatley is a 89 y.o. female who is a long term care patient being seen and evaluated for weakness.    HPI:  HPI  Patient is currently working in therapy.  Patient is working with PT.  Therapy is working with patient on strengthening and balance exercises to improve overall functional mobility and task.  Patient seen and examined at the bedside, appears to be in no acute distress.  Nursing staff voicing no new concerns.    Objective   Vital signs: 154/71, 97.8, 77, 18, 98%    Physical Exam  Constitutional:       General: She is not in acute distress.  Eyes:      Extraocular Movements: Extraocular movements intact.   Pulmonary:      Effort: Pulmonary effort is normal.      Comments:  O2  Musculoskeletal:      Cervical back: Neck supple.   Neurological:      Mental Status: She is alert.      Motor: Weakness present.   Psychiatric:         Mood and Affect: Mood normal.         Behavior: Behavior is cooperative.         Assessment/Plan   Problem List Items Addressed This Visit       COPD (chronic obstructive pulmonary disease) (Multi)     Continue inhaler and aerosol treatment  Monitor for shortness of breath, wheezing  O2           Hypertension, essential     Monitor blood pressure         Weakness - Primary     Continue to work towards goals of therapy          Medications, treatments, and labs reviewed  Continue medications and treatments as listed in EMR    Scribe Attestation  I, Leslye Gregorio   attest that this documentation has been prepared under the direction and in the presence of Corrie Desouza MD    Provider Attestation - Scribe documentation  All medical record entries made by the Scribe were at my direction and personally dictated by me. I have reviewed the chart and agree that the record accurately reflects my personal performance of the history, physical exam, discussion and plan.   Corrie Desouza MD

## 2024-07-03 ENCOUNTER — HOSPITAL ENCOUNTER (OUTPATIENT)
Dept: RADIOLOGY | Facility: HOSPITAL | Age: 89
Discharge: HOME | End: 2024-07-03
Payer: MEDICARE

## 2024-07-03 ENCOUNTER — NURSING HOME VISIT (OUTPATIENT)
Dept: POST ACUTE CARE | Facility: EXTERNAL LOCATION | Age: 89
End: 2024-07-03
Payer: COMMERCIAL

## 2024-07-03 DIAGNOSIS — R93.89 ABNORMAL CHEST X-RAY: ICD-10-CM

## 2024-07-03 DIAGNOSIS — R53.1 WEAKNESS: Primary | ICD-10-CM

## 2024-07-03 DIAGNOSIS — J44.9 CHRONIC OBSTRUCTIVE PULMONARY DISEASE, UNSPECIFIED COPD TYPE (MULTI): ICD-10-CM

## 2024-07-03 DIAGNOSIS — I10 HYPERTENSION, ESSENTIAL: ICD-10-CM

## 2024-07-03 DIAGNOSIS — F32.A DEPRESSION, UNSPECIFIED DEPRESSION TYPE: ICD-10-CM

## 2024-07-03 PROCEDURE — 71250 CT THORAX DX C-: CPT

## 2024-07-03 NOTE — LETTER
Patient: Ruth Wheatley  : 10/30/1929    Encounter Date: 2024    PROGRESS NOTE    Subjective  Chief complaint: Ruth Wheatley is a 89 y.o. female who is a long term care patient being seen and evaluated for weakness.    HPI:  HPI  An interactive audio and/or video telecommunication system which permits real time communications between the patient (at the originating site) and provider (at a distant site) was utilized to provide this telehealth service after obtaining verbal consent.  Patient does continue working with PT.  Patient is working on strengthening and balance exercises.  Patient does continue to work on gait training, ambulating with rolling walker and SBA up to 40 feet x 3.  Patient appears to be in no acute distress.  Nursing staff voicing no new concerns.  Patient denies chest pain, shortness of breath, nausea or vomiting.    Objective  Vital signs: 154/71, 97.8, 77, 18, 96%    Physical Exam  Constitutional:       General: She is not in acute distress.  Eyes:      Extraocular Movements: Extraocular movements intact.   Pulmonary:      Effort: Pulmonary effort is normal.      Comments:  O2  Musculoskeletal:      Cervical back: Neck supple.   Neurological:      Mental Status: She is alert.      Motor: Weakness present.   Psychiatric:         Mood and Affect: Mood normal.         Behavior: Behavior is cooperative.         Assessment/Plan  Problem List Items Addressed This Visit       COPD (chronic obstructive pulmonary disease) (Multi)     Continue inhaler and aerosol treatment  Monitor for shortness of breath, wheezing  O2           Depression     Monitor mood  Lexapro          Hypertension, essential     Monitor blood pressure         Weakness - Primary     Continue to work towards goals in therapy          Medications, treatments, and labs reviewed  Continue medications and treatments as listed in EMR    Scribe Attestation  I, Leslye Gregorio   attest that this documentation has been  prepared under the direction and in the presence of Corrie Desouza MD    Provider Attestation - Scribe documentation  All medical record entries made by the Scribe were at my direction and personally dictated by me. I have reviewed the chart and agree that the record accurately reflects my personal performance of the history, physical exam, discussion and plan.   Corrie Desouza MD            Electronically Signed By: Corrie Desouza MD   7/4/24  6:29 AM

## 2024-07-03 NOTE — PROGRESS NOTES
PROGRESS NOTE    Subjective   Chief complaint: Ruth Wheatley is a 89 y.o. female who is a long term care patient being seen and evaluated for weakness.    HPI:  HPI  An interactive audio and/or video telecommunication system which permits real time communications between the patient (at the originating site) and provider (at a distant site) was utilized to provide this telehealth service after obtaining verbal consent.  Patient does continue working with PT.  Patient is working on strengthening and balance exercises.  Patient does continue to work on gait training, ambulating with rolling walker and SBA up to 40 feet x 3.  Patient appears to be in no acute distress.  Nursing staff voicing no new concerns.  Patient denies chest pain, shortness of breath, nausea or vomiting.    Objective   Vital signs: 154/71, 97.8, 77, 18, 96%    Physical Exam  Constitutional:       General: She is not in acute distress.  Eyes:      Extraocular Movements: Extraocular movements intact.   Pulmonary:      Effort: Pulmonary effort is normal.      Comments:  O2  Musculoskeletal:      Cervical back: Neck supple.   Neurological:      Mental Status: She is alert.      Motor: Weakness present.   Psychiatric:         Mood and Affect: Mood normal.         Behavior: Behavior is cooperative.         Assessment/Plan   Problem List Items Addressed This Visit       COPD (chronic obstructive pulmonary disease) (Multi)     Continue inhaler and aerosol treatment  Monitor for shortness of breath, wheezing  O2           Depression     Monitor mood  Lexapro          Hypertension, essential     Monitor blood pressure         Weakness - Primary     Continue to work towards goals in therapy          Medications, treatments, and labs reviewed  Continue medications and treatments as listed in EMR    Scribe Attestation  JANAY, Leslye Gregorio   attest that this documentation has been prepared under the direction and in the presence of Corrie Desouza  MD    Provider Attestation - Scribe documentation  All medical record entries made by the Scribe were at my direction and personally dictated by me. I have reviewed the chart and agree that the record accurately reflects my personal performance of the history, physical exam, discussion and plan.   Corrie Desouza MD

## 2024-07-05 ENCOUNTER — NURSING HOME VISIT (OUTPATIENT)
Dept: POST ACUTE CARE | Facility: EXTERNAL LOCATION | Age: 89
End: 2024-07-05
Payer: MEDICARE

## 2024-07-05 DIAGNOSIS — J44.9 CHRONIC OBSTRUCTIVE PULMONARY DISEASE, UNSPECIFIED COPD TYPE (MULTI): ICD-10-CM

## 2024-07-05 DIAGNOSIS — I10 HYPERTENSION, ESSENTIAL: ICD-10-CM

## 2024-07-05 DIAGNOSIS — F32.A DEPRESSION, UNSPECIFIED DEPRESSION TYPE: ICD-10-CM

## 2024-07-05 DIAGNOSIS — E55.9 VITAMIN D DEFICIENCY: ICD-10-CM

## 2024-07-05 PROCEDURE — 99309 SBSQ NF CARE MODERATE MDM 30: CPT | Performed by: INTERNAL MEDICINE

## 2024-07-05 NOTE — LETTER
Patient: Ruth Wheatley  : 10/30/1929    Encounter Date: 2024    PROGRESS NOTE    Subjective  Chief complaint: Ruth Wheatley is a 89 y.o. female who is a long term care patient being seen and evaluated for monthly general medical care and follow-up. An interactive audio and/or video telecommunication system which permits real time communications between the patient (at the originating site) and provider (at a distant site) was utilized to provide this telehealth service after obtaining verbal consent.    HPI:  Patient presents for general medical care and f/u.  Patient seen and examined at bedside. Patient has diagnosis of COPD, denies sob, wheezing, cough. HTN, denies fatigue, sob, and palpitations. Patient with diagnosis of depression, denies feeling down and thoughts of harming self or others.  No issues per nursing.  Patient has no acute complaints.      Objective  Vital signs: 154/71,77,99%    Physical Exam  Constitutional:       General: She is not in acute distress.  Eyes:      Extraocular Movements: Extraocular movements intact.   Pulmonary:      Effort: Pulmonary effort is normal.      Comments:  O2  Musculoskeletal:      Cervical back: Neck supple.   Neurological:      Mental Status: She is alert.      Motor: Weakness present.   Psychiatric:         Mood and Affect: Mood normal.         Behavior: Behavior is cooperative.         Assessment/Plan  Problem List Items Addressed This Visit       COPD (chronic obstructive pulmonary disease) (Multi)     Continue inhaler and aerosol treatment  Monitor for shortness of breath, wheezing  O2         Depression     Monitor mood  Lexapro          Hypertension, essential     Carvedilol  Cozaar  amlodipine  Monitor blood pressure         Vitamin D deficiency     Continue Vit D   Obtain Vit d level yearly          Medications, treatments, and labs reviewed  Continue medications and treatments as listed in EMR      Scribe Attestation  I, Helen Anton,  Scribe   attest that this documentation has been prepared under the direction and in the presence of Corire Desouza MD.     Provider Attestation - Scribe documentation  All medical record entries made by the Scribe were at my direction and personally dictated by me. I have reviewed the chart and agree that the record accurately reflects my personal performance of the history, physical exam, discussion and plan.   Corrie Desouza MD      Electronically Signed By: Corrie Desouza MD   7/8/24  5:42 PM

## 2024-07-08 NOTE — PROGRESS NOTES
PROGRESS NOTE    Subjective   Chief complaint: Ruth Wheatley is a 89 y.o. female who is a long term care patient being seen and evaluated for monthly general medical care and follow-up. An interactive audio and/or video telecommunication system which permits real time communications between the patient (at the originating site) and provider (at a distant site) was utilized to provide this telehealth service after obtaining verbal consent.    HPI:  Patient presents for general medical care and f/u.  Patient seen and examined at bedside. Patient has diagnosis of COPD, denies sob, wheezing, cough. HTN, denies fatigue, sob, and palpitations. Patient with diagnosis of depression, denies feeling down and thoughts of harming self or others.  No issues per nursing.  Patient has no acute complaints.      Objective   Vital signs: 154/71,77,99%    Physical Exam  Constitutional:       General: She is not in acute distress.  Eyes:      Extraocular Movements: Extraocular movements intact.   Pulmonary:      Effort: Pulmonary effort is normal.      Comments:  O2  Musculoskeletal:      Cervical back: Neck supple.   Neurological:      Mental Status: She is alert.      Motor: Weakness present.   Psychiatric:         Mood and Affect: Mood normal.         Behavior: Behavior is cooperative.         Assessment/Plan   Problem List Items Addressed This Visit       COPD (chronic obstructive pulmonary disease) (Multi)     Continue inhaler and aerosol treatment  Monitor for shortness of breath, wheezing  O2         Depression     Monitor mood  Lexapro          Hypertension, essential     Carvedilol  Cozaar  amlodipine  Monitor blood pressure         Vitamin D deficiency     Continue Vit D   Obtain Vit d level yearly          Medications, treatments, and labs reviewed  Continue medications and treatments as listed in EMR      Scribe Attestation  I, Leslye Colon   attest that this documentation has been prepared under the direction  and in the presence of Corrie Desouza MD.     Provider Attestation - Scribe documentation  All medical record entries made by the Scribe were at my direction and personally dictated by me. I have reviewed the chart and agree that the record accurately reflects my personal performance of the history, physical exam, discussion and plan.   Corrie Desouza MD

## 2024-07-10 ENCOUNTER — NURSING HOME VISIT (OUTPATIENT)
Dept: POST ACUTE CARE | Facility: EXTERNAL LOCATION | Age: 89
End: 2024-07-10
Payer: MEDICARE

## 2024-07-10 DIAGNOSIS — I10 HYPERTENSION, ESSENTIAL: ICD-10-CM

## 2024-07-10 DIAGNOSIS — R53.1 WEAKNESS: Primary | ICD-10-CM

## 2024-07-10 DIAGNOSIS — J44.9 CHRONIC OBSTRUCTIVE PULMONARY DISEASE, UNSPECIFIED COPD TYPE (MULTI): ICD-10-CM

## 2024-07-10 PROCEDURE — 99308 SBSQ NF CARE LOW MDM 20: CPT | Performed by: INTERNAL MEDICINE

## 2024-07-10 NOTE — LETTER
Patient: Ruth Wheatley  : 10/30/1929    Encounter Date: 07/10/2024    PROGRESS NOTE    Subjective  Chief complaint: Ruth Wheatley is a 89 y.o. female who is a long term care patient being seen and evaluated for weakness.    HPI:  HPI  Patient is currently working in therapy due to generalized weakness.  Therapy is working with patient on strength training and balance exercises to improve overall functional mobility and endurance.  Patient also working on gait training.  Patient is able to ambulate with therapy with rolling walker up to 40 feet.  Patient seen and examined at the bedside, appears to be in no acute distress.    Objective  Vital signs: 154/71, 97.8, 77, 18, 97%    Physical Exam  Constitutional:       General: She is not in acute distress.  Eyes:      Extraocular Movements: Extraocular movements intact.   Pulmonary:      Effort: Pulmonary effort is normal.      Comments:  O2  Musculoskeletal:      Cervical back: Neck supple.   Neurological:      Mental Status: She is alert.      Motor: Weakness present.   Psychiatric:         Mood and Affect: Mood normal.         Behavior: Behavior is cooperative.         Assessment/Plan  Problem List Items Addressed This Visit       COPD (chronic obstructive pulmonary disease) (Multi)     Continue inhaler and aerosol treatment  Monitor for shortness of breath, wheezing  O2           Hypertension, essential     Monitor blood pressure         Weakness - Primary     Continue working towards goals in therapy          Medications, treatments, and labs reviewed  Continue medications and treatments as listed in EMR    Scribe Attestation  I, Leslye Gregorio   attest that this documentation has been prepared under the direction and in the presence of Corrie Desouza MD    Provider Attestation - Scribe documentation  All medical record entries made by the Scribe were at my direction and personally dictated by me. I have reviewed the chart and agree that the record  Pt returned to floor from CT.   accurately reflects my personal performance of the history, physical exam, discussion and plan.   Corrie Desouza MD            Electronically Signed By: Corrie Desouza MD   7/11/24 11:29 AM

## 2024-07-10 NOTE — PROGRESS NOTES
PROGRESS NOTE    Subjective   Chief complaint: Ruth Wheatley is a 89 y.o. female who is a long term care patient being seen and evaluated for weakness.    HPI:  HPI  Patient is currently working in therapy due to generalized weakness.  Therapy is working with patient on strength training and balance exercises to improve overall functional mobility and endurance.  Patient also working on gait training.  Patient is able to ambulate with therapy with rolling walker up to 40 feet.  Patient seen and examined at the bedside, appears to be in no acute distress.    Objective   Vital signs: 154/71, 97.8, 77, 18, 97%    Physical Exam  Constitutional:       General: She is not in acute distress.  Eyes:      Extraocular Movements: Extraocular movements intact.   Pulmonary:      Effort: Pulmonary effort is normal.      Comments:  O2  Musculoskeletal:      Cervical back: Neck supple.   Neurological:      Mental Status: She is alert.      Motor: Weakness present.   Psychiatric:         Mood and Affect: Mood normal.         Behavior: Behavior is cooperative.         Assessment/Plan   Problem List Items Addressed This Visit       COPD (chronic obstructive pulmonary disease) (Multi)     Continue inhaler and aerosol treatment  Monitor for shortness of breath, wheezing  O2           Hypertension, essential     Monitor blood pressure         Weakness - Primary     Continue working towards goals in therapy          Medications, treatments, and labs reviewed  Continue medications and treatments as listed in EMR    Scribe Attestation  I, Leslye Gregorio   attest that this documentation has been prepared under the direction and in the presence of Corrie Desouza MD    Provider Attestation - Scribe documentation  All medical record entries made by the Scribe were at my direction and personally dictated by me. I have reviewed the chart and agree that the record accurately reflects my personal performance of the history, physical  exam, discussion and plan.   Corrie Desouza MD

## 2024-07-14 ENCOUNTER — HOSPITAL ENCOUNTER (EMERGENCY)
Facility: HOSPITAL | Age: 89
Discharge: HOME | End: 2024-07-14
Payer: MEDICARE

## 2024-07-14 VITALS
WEIGHT: 115 LBS | OXYGEN SATURATION: 94 % | HEIGHT: 59 IN | RESPIRATION RATE: 16 BRPM | HEART RATE: 75 BPM | BODY MASS INDEX: 23.18 KG/M2 | TEMPERATURE: 96.7 F

## 2024-07-14 DIAGNOSIS — R04.0 BLEEDING FROM THE NOSE: Primary | ICD-10-CM

## 2024-07-14 PROCEDURE — 30901 CONTROL OF NOSEBLEED: CPT | Mod: LT

## 2024-07-14 PROCEDURE — 99283 EMERGENCY DEPT VISIT LOW MDM: CPT

## 2024-07-14 PROCEDURE — 2500000001 HC RX 250 WO HCPCS SELF ADMINISTERED DRUGS (ALT 637 FOR MEDICARE OP): Performed by: PHYSICIAN ASSISTANT

## 2024-07-14 RX ORDER — OXYMETAZOLINE HCL 0.05 %
2 SPRAY, NON-AEROSOL (ML) NASAL ONCE
Status: COMPLETED | OUTPATIENT
Start: 2024-07-14 | End: 2024-07-14

## 2024-07-14 ASSESSMENT — PAIN SCALES - GENERAL: PAINLEVEL_OUTOF10: 0 - NO PAIN

## 2024-07-14 ASSESSMENT — LIFESTYLE VARIABLES
HAVE YOU EVER FELT YOU SHOULD CUT DOWN ON YOUR DRINKING: NO
EVER FELT BAD OR GUILTY ABOUT YOUR DRINKING: NO
HAVE PEOPLE ANNOYED YOU BY CRITICIZING YOUR DRINKING: NO
TOTAL SCORE: 0
EVER HAD A DRINK FIRST THING IN THE MORNING TO STEADY YOUR NERVES TO GET RID OF A HANGOVER: NO

## 2024-07-14 ASSESSMENT — COLUMBIA-SUICIDE SEVERITY RATING SCALE - C-SSRS
6. HAVE YOU EVER DONE ANYTHING, STARTED TO DO ANYTHING, OR PREPARED TO DO ANYTHING TO END YOUR LIFE?: NO
2. HAVE YOU ACTUALLY HAD ANY THOUGHTS OF KILLING YOURSELF?: NO
1. IN THE PAST MONTH, HAVE YOU WISHED YOU WERE DEAD OR WISHED YOU COULD GO TO SLEEP AND NOT WAKE UP?: NO

## 2024-07-14 ASSESSMENT — PAIN - FUNCTIONAL ASSESSMENT: PAIN_FUNCTIONAL_ASSESSMENT: 0-10

## 2024-07-14 NOTE — ED PROVIDER NOTES
EMERGENCY MEDICINE EVALUATION NOTE    History of Present Illness     Chief Complaint:   Chief Complaint   Patient presents with    Epistaxis (Nose Bleed)     Started x3 hours ago. No trauma, no thinners.        HPI: Ruth Wheatley is a 89 y.o. female presents with a chief complaint of nosebleed.  She reports that it started about 3 hours prior to arrival in the ED.  She states that there is no trauma to the nose or sinus started bleeding.  She reports that its located the left nostril.  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  Patient denies any blood in the back of her throat at this time.  She denies any use of any anticoagulation.  She denies any headache, fevers, or chills.    Previous History   No past medical history on file.  No past surgical history on file.     No family history on file.  Not on File  No current outpatient medications    Physical Exam     Appearance: Alert, oriented , cooperative.  Elderly.     Skin: Intact,  dry skin, no lesions, rash, petechiae or purpura.      Eyes: PERRLA, EOMs intact,  Conjunctiva pink      ENT: Hearing grossly intact. Pharynx clear, uvula midline, no blood in posterior pharynx.  Large amount of blood present in the left nare, no blood present in right nare.  Difficult to assess for blood is coming from.     Neck: Supple. Trachea at midline.      Pulmonary: Clear bilaterally. No rales, rhonchi or wheezing. No accessory muscle use or stridor.     Cardiac: Normal rate and rhythm without murmur     Abdomen: Soft, nontender, active bowel sounds.     Musculoskeletal: Full range of motion.     Neurological:Cranial nerves II through XII are grossly intact, normal sensation, no weakness, no focal findings identified.     Results   Labs Reviewed - No data to display  No orders to display         ED Course & Medical Decision Making     Medications   oxymetazoline (Afrin) 0.05 %  "nasal spray 2 spray (2 sprays Left Nostril Given 7/14/24 1520)     Heart Rate:  [75]   Temperature:  [35.9 °C (96.7 °F)]   Respirations:  [16]   Height:  [149.9 cm (4' 11\")]   Weight:  [52.2 kg (115 lb)]   Pulse Ox:  [94 %]    ED Course as of 07/14/24 1649   Sun Jul 14, 2024   1523 Afrin was placed in nostril at this time.  There is no blood noted in the posterior pharynx.  Patient had nose clamp placed at 3:20 PM.  Patient be reassessed in 15 to 20 minutes to see if there is any bleeding. [CJ]   1540 Reassessed patient at this time she is still having just a small amount of bleeding on the left nostril.  Patient will have clip left arm from there 10 to 15 minutes and then be reassessed. [CJ]   1556 Reassessed patient again at this time there was currently no bleeding.  We took lip off.  Patient will be observed for some time.  If bleeding continues patient have nasal packing placed in the left nostril. [CJ]   1618 Reassessed the patient started bleeding at this time.  At this time nasal packing will be placed. [CJ]   1623 At this time nasal packing was placed in the left nose.  5.5 cm anterior nasal pack was placed with 2.5 mL's of air injected into the balloon.  Currently no bleeding is taking place.  Patient be observed to ensure there is no rebleeding at this time. [CJ]   1638 Patient has no significant rebleeding at this time.  There is a small amount of blood but I informed her that once it clots and the saline dries that was used to insert the nasal packing will stop bleeding.  There once again is no blood in the posterior pharynx does not appear to be any posterior bleed.  Patient be discharged at this time to follow-up with primary care provider and she will be given an ENT referral.  She was encouraged to leave balloon in until ENT appointment. [CJ]      ED Course User Index  [CJ] Kush Brito PA-C         Diagnoses as of 07/14/24 1649   Bleeding from the nose       Procedures   Procedures    Diagnosis "     1. Bleeding from the nose        Disposition   Discharged     ED Prescriptions    None         Disclaimer: This note was dictated by speech recognition. Minor errors in transcription may be present. Please call if questions.       Kush Brito PA-C  07/14/24 [id number]

## 2024-07-15 ENCOUNTER — NURSING HOME VISIT (OUTPATIENT)
Dept: POST ACUTE CARE | Facility: EXTERNAL LOCATION | Age: 89
End: 2024-07-15
Payer: MEDICARE

## 2024-07-15 DIAGNOSIS — I10 HYPERTENSION, ESSENTIAL: ICD-10-CM

## 2024-07-15 DIAGNOSIS — R04.0 EPISTAXIS: Primary | ICD-10-CM

## 2024-07-15 DIAGNOSIS — J44.9 COPD WITHOUT EXACERBATION (MULTI): ICD-10-CM

## 2024-07-15 PROCEDURE — 99309 SBSQ NF CARE MODERATE MDM 30: CPT | Performed by: NURSE PRACTITIONER

## 2024-07-15 NOTE — LETTER
Patient: Ruth Wheatley  : 10/30/1929    Encounter Date: 07/15/2024    PROGRESS NOTE    Subjective  Chief complaint: Ruth Wheatley is a 89 y.o. female who is a long term care patient being seen and evaluated for bloody nose.     HPI:  HPI  Nurse reporting the patient had bloody nose yesterday that would not stop bleeding. Patient was sent to the  emergency room and Rhino Rocket was placed.  Shee returned she returned to facility and has no new concerns otherwise.     Objective  Vital signs:  86/40, 97.8, 62, 16, 93%    Physical Exam  Constitutional:       General: She is not in acute distress.  HENT:      Nose:      Comments:  RhinoRocket left nare  Eyes:      Extraocular Movements: Extraocular movements intact.   Pulmonary:      Effort: Pulmonary effort is normal.      Breath sounds: No decreased breath sounds.      Comments:  O2  Musculoskeletal:      Cervical back: Neck supple.   Neurological:      Mental Status: She is alert.   Psychiatric:         Mood and Affect: Mood normal.         Behavior: Behavior is cooperative.         Assessment/Plan  Problem List Items Addressed This Visit       COPD without exacerbation (Multi)     Continue inhaler and aerosol treatment  Monitor for shortness of breath, wheezing  O2           Epistaxis - Primary      RhinoRocket placed in ED         Hypertension, essential     Monitor blood pressure          Medications, treatments, and labs reviewed  Continue medications and treatments as listed in EMR    ISAURO Lorenzana    Scribe Attestation  Charlene GUSTAFSON   attest that this documentation has been prepared under the direction and in the presence of ISAURO Lorenzana    Provider Attestation - Scribe documentation  All medical record entries made by the Scribe were at my direction and personally dictated by me. I have reviewed the chart and agree that the record accurately reflects my personal performance of the history, physical exam, discussion  and plan.      Electronically Signed By: ISAURO Lorenzana   7/25/24  4:26 PM

## 2024-07-16 ENCOUNTER — NURSING HOME VISIT (OUTPATIENT)
Dept: POST ACUTE CARE | Facility: EXTERNAL LOCATION | Age: 89
End: 2024-07-16
Payer: MEDICARE

## 2024-07-16 DIAGNOSIS — R53.81 MALAISE: ICD-10-CM

## 2024-07-16 DIAGNOSIS — I10 HYPERTENSION, ESSENTIAL: ICD-10-CM

## 2024-07-16 DIAGNOSIS — J44.9 COPD WITHOUT EXACERBATION (MULTI): Primary | ICD-10-CM

## 2024-07-16 DIAGNOSIS — R53.1 WEAKNESS: ICD-10-CM

## 2024-07-16 PROCEDURE — 99309 SBSQ NF CARE MODERATE MDM 30: CPT | Performed by: NURSE PRACTITIONER

## 2024-07-16 NOTE — LETTER
Patient: Ruth Wheatley  : 10/30/1929    Encounter Date: 2024    PROGRESS NOTE    Subjective  Chief complaint: Ruth Wheatley is a 89 y.o. female who is a long term care patient being seen and evaluated for Malaise    HPI:  HPI   Nurse called 7/15 and reported patient feeling lightheaded, low blood pressure, pale in color.  Order given to obtain BMP, CBC stat elevated feet and give oral fluids then recheck pressure.  Patient seen and examined at bedside today no acute distress.  Labs are pending.  Patient states that she is feeling weak but no specific concerns or complaints otherwise.  Denies constitutional symptoms.      Objective  Vital signs:  86/40, 97.8, 62, 16, 93%    Physical Exam  Constitutional:       General: She is not in acute distress.  HENT:      Nose:      Comments:  left nare Rhino Rocket  Eyes:      Extraocular Movements: Extraocular movements intact.   Pulmonary:      Effort: Pulmonary effort is normal.      Breath sounds: Decreased breath sounds present.      Comments:  O2  Musculoskeletal:      Cervical back: Neck supple.   Neurological:      Mental Status: She is alert.      Motor: Weakness present.   Psychiatric:         Mood and Affect: Mood normal.         Behavior: Behavior is cooperative.         Assessment/Plan  Problem List Items Addressed This Visit       COPD without exacerbation (Multi) - Primary     Continue inhaler and aerosol treatment  Monitor for shortness of breath, wheezing  O2           Hypertension, essential     Monitor blood pressure         Malaise     Work up pending         Weakness      therapy consult          Medications, treatments, and labs reviewed  Continue medications and treatments as listed in EMR    ISAURO Lorenzana    Scribe Attestation  ICharlene   attest that this documentation has been prepared under the direction and in the presence of ISAURO Lorenzana    Provider Attestation - Scribe documentation  All  medical record entries made by the Scribe were at my direction and personally dictated by me. I have reviewed the chart and agree that the record accurately reflects my personal performance of the history, physical exam, discussion and plan.      Electronically Signed By: ISAURO Lorenzana   7/23/24  1:37 PM

## 2024-07-17 ENCOUNTER — NURSING HOME VISIT (OUTPATIENT)
Dept: POST ACUTE CARE | Facility: EXTERNAL LOCATION | Age: 89
End: 2024-07-17
Payer: COMMERCIAL

## 2024-07-17 DIAGNOSIS — R04.0 EPISTAXIS: Primary | ICD-10-CM

## 2024-07-17 DIAGNOSIS — J44.9 CHRONIC OBSTRUCTIVE PULMONARY DISEASE, UNSPECIFIED COPD TYPE (MULTI): ICD-10-CM

## 2024-07-17 DIAGNOSIS — R42 LIGHTHEADEDNESS: ICD-10-CM

## 2024-07-17 DIAGNOSIS — I10 HYPERTENSION, ESSENTIAL: ICD-10-CM

## 2024-07-17 PROCEDURE — 99308 SBSQ NF CARE LOW MDM 20: CPT | Performed by: INTERNAL MEDICINE

## 2024-07-17 NOTE — PROGRESS NOTES
PROGRESS NOTE    Subjective   Chief complaint: Ruth Wheatley is a 89 y.o. female who is a long term care patient being seen and evaluated for epistaxis.    HPI:  HPI  Patient is seen in follow-up of recent nosebleed, had a Rhino Rocket placed.  No further bleeding at this time.  Patient reportedly was pale, lightheaded and low blood pressure The day following nosebleed.  Patient reports feeling fine now.  Patient has labs pending.  Patient is afebrile.    Objective   Vital signs: 86/40, 97.8, 62, 16, 97%    Physical Exam  Constitutional:       General: She is not in acute distress.  Eyes:      Extraocular Movements: Extraocular movements intact.   Pulmonary:      Effort: Pulmonary effort is normal.      Comments:  O2  Musculoskeletal:      Cervical back: Neck supple.   Neurological:      Mental Status: She is alert.   Psychiatric:         Mood and Affect: Mood normal.         Behavior: Behavior is cooperative.         Assessment/Plan   Problem List Items Addressed This Visit       COPD (chronic obstructive pulmonary disease) (Multi)     Continue inhaler and aerosol treatment  Monitor for shortness of breath, wheezing  O2           Hypertension, essential     Monitor blood pressure         Epistaxis - Primary     No further bleeding         Lightheadedness     Pale, low BP, labs pending  Elevation of feet  P.o. fluids          Medications, treatments, and labs reviewed  Continue medications and treatments as listed in EMR    Scribe Attestation  ISelena Scribe   attest that this documentation has been prepared under the direction and in the presence of Corrie Desouza MD    Provider Attestation - Scribe documentation  All medical record entries made by the Scribe were at my direction and personally dictated by me. I have reviewed the chart and agree that the record accurately reflects my personal performance of the history, physical exam, discussion and plan.   Corrie Desouza MD

## 2024-07-17 NOTE — LETTER
Patient: Ruth Wheatley  : 10/30/1929    Encounter Date: 2024    PROGRESS NOTE    Subjective  Chief complaint: Ruth Wheatley is a 89 y.o. female who is a long term care patient being seen and evaluated for epistaxis.    HPI:  HPI  Patient is seen in follow-up of recent nosebleed, had a Rhino Rocket placed.  No further bleeding at this time.  Patient reportedly was pale, lightheaded and low blood pressure The day following nosebleed.  Patient reports feeling fine now.  Patient has labs pending.  Patient is afebrile.    Objective  Vital signs: 86/40, 97.8, 62, 16, 97%    Physical Exam  Constitutional:       General: She is not in acute distress.  Eyes:      Extraocular Movements: Extraocular movements intact.   Pulmonary:      Effort: Pulmonary effort is normal.      Comments:  O2  Musculoskeletal:      Cervical back: Neck supple.   Neurological:      Mental Status: She is alert.   Psychiatric:         Mood and Affect: Mood normal.         Behavior: Behavior is cooperative.         Assessment/Plan  Problem List Items Addressed This Visit       COPD (chronic obstructive pulmonary disease) (Multi)     Continue inhaler and aerosol treatment  Monitor for shortness of breath, wheezing  O2           Hypertension, essential     Monitor blood pressure         Epistaxis - Primary     No further bleeding         Lightheadedness     Pale, low BP, labs pending  Elevation of feet  P.o. fluids          Medications, treatments, and labs reviewed  Continue medications and treatments as listed in EMR    Scribe Attestation  Selena GUSTAFSON Scribe   attest that this documentation has been prepared under the direction and in the presence of Corrie Desouza MD    Provider Attestation - Scribe documentation  All medical record entries made by the Scribe were at my direction and personally dictated by me. I have reviewed the chart and agree that the record accurately reflects my personal performance of the history, physical  exam, discussion and plan.   Corrie Desouza MD            Electronically Signed By: Corrie Desouza MD   7/17/24  3:17 PM

## 2024-07-21 NOTE — PROGRESS NOTES
PROGRESS NOTE    Subjective   Chief complaint: Ruth Wheatley is a 89 y.o. female who is a long term care patient being seen and evaluated for bloody nose.     HPI:  HPI  Nurse reporting the patient had bloody nose yesterday that would not stop bleeding. Patient was sent to the  emergency room and Rhino Rocket was placed.  Shee returned she returned to facility and has no new concerns otherwise.     Objective   Vital signs:  86/40, 97.8, 62, 16, 93%    Physical Exam  Constitutional:       General: She is not in acute distress.  HENT:      Nose:      Comments:  RhinoRocket left nare  Eyes:      Extraocular Movements: Extraocular movements intact.   Pulmonary:      Effort: Pulmonary effort is normal.      Breath sounds: No decreased breath sounds.      Comments:  O2  Musculoskeletal:      Cervical back: Neck supple.   Neurological:      Mental Status: She is alert.   Psychiatric:         Mood and Affect: Mood normal.         Behavior: Behavior is cooperative.         Assessment/Plan   Problem List Items Addressed This Visit       COPD without exacerbation (Multi)     Continue inhaler and aerosol treatment  Monitor for shortness of breath, wheezing  O2           Epistaxis - Primary      RhinoRocket placed in ED         Hypertension, essential     Monitor blood pressure          Medications, treatments, and labs reviewed  Continue medications and treatments as listed in EMR    ISAURO Lorenzana    Scribe Attestation  Charlene GUSTAFSONibdesean   attest that this documentation has been prepared under the direction and in the presence of ISAURO Lorenzana    Provider Attestation - Scribe documentation  All medical record entries made by the Scribe were at my direction and personally dictated by me. I have reviewed the chart and agree that the record accurately reflects my personal performance of the history, physical exam, discussion and plan.

## 2024-07-23 ENCOUNTER — NURSING HOME VISIT (OUTPATIENT)
Dept: POST ACUTE CARE | Facility: EXTERNAL LOCATION | Age: 89
End: 2024-07-23
Payer: MEDICARE

## 2024-07-23 DIAGNOSIS — I10 HYPERTENSION, ESSENTIAL: ICD-10-CM

## 2024-07-23 DIAGNOSIS — J44.9 COPD WITHOUT EXACERBATION (MULTI): ICD-10-CM

## 2024-07-23 DIAGNOSIS — R04.0 EPISTAXIS: Primary | ICD-10-CM

## 2024-07-23 PROBLEM — R53.81 MALAISE: Status: ACTIVE | Noted: 2024-07-23

## 2024-07-23 PROCEDURE — 99308 SBSQ NF CARE LOW MDM 20: CPT | Performed by: NURSE PRACTITIONER

## 2024-07-23 NOTE — LETTER
Patient: Ruth Wheatley  : 10/30/1929    Encounter Date: 2024    PROGRESS NOTE    Subjective  Chief complaint: Ruth Wheatley is a 89 y.o. female who is a long term care patient being seen and evaluated for follow-up on epistaxis.    HPI:  HPI Patient seen and evaluated for follow-up on epistaxis. Patient recently had Rhino Rocket placed in left nare in the ED due to bloody nose.  She presents for removal.      Objective  Vital signs: 86/40 - 97.4-83-16-98%    Physical Exam  Constitutional:       General: She is not in acute distress.  HENT:      Nose:      Comments: Left nare Rhino Rocket  Eyes:      Extraocular Movements: Extraocular movements intact.   Pulmonary:      Effort: Pulmonary effort is normal.   Musculoskeletal:      Cervical back: Neck supple.   Neurological:      Mental Status: She is alert.   Psychiatric:         Mood and Affect: Mood normal.         Behavior: Behavior is cooperative.         Assessment/Plan  Problem List Items Addressed This Visit       COPD without exacerbation (Multi)     Continue inhaler and aerosol treatment  Monitor for shortness of breath, wheezing  O2         Epistaxis - Primary     Removed Rhino Rocket from left nare  Patient tolerated the procedure with no discomfort throughout or after procedure         Hypertension, essential     BP at goal  Monitor blood pressure          Medications, treatments, and labs reviewed  Continue medications and treatments as listed in EMR    Scribe Attestation  IChristoph Scribe   attest that this documentation has been prepared under the direction and in the presence of JANE Lorenzana-CNP    Provider Attestation - Scribe documentation  All medical record entries made by the Scribe were at my direction and personally dictated by me. I have reviewed the chart and agree that the record accurately reflects my personal performance of the history, physical exam, discussion and plan.   Gi Piña,  APRN-CNP            Electronically Signed By: ISAURO Lorenzana   7/30/24  7:15 PM

## 2024-07-23 NOTE — PROGRESS NOTES
PROGRESS NOTE    Subjective   Chief complaint: Ruth Wheatley is a 89 y.o. female who is a long term care patient being seen and evaluated for Malaise    HPI:  HPI   Nurse called 7/15 and reported patient feeling lightheaded, low blood pressure, pale in color.  Order given to obtain BMP, CBC stat elevated feet and give oral fluids then recheck pressure.  Patient seen and examined at bedside today no acute distress.  Labs are pending.  Patient states that she is feeling weak but no specific concerns or complaints otherwise.  Denies constitutional symptoms.      Objective   Vital signs:  86/40, 97.8, 62, 16, 93%    Physical Exam  Constitutional:       General: She is not in acute distress.  HENT:      Nose:      Comments:  left nare Rhino Rocket  Eyes:      Extraocular Movements: Extraocular movements intact.   Pulmonary:      Effort: Pulmonary effort is normal.      Breath sounds: Decreased breath sounds present.      Comments:  O2  Musculoskeletal:      Cervical back: Neck supple.   Neurological:      Mental Status: She is alert.      Motor: Weakness present.   Psychiatric:         Mood and Affect: Mood normal.         Behavior: Behavior is cooperative.         Assessment/Plan   Problem List Items Addressed This Visit       COPD without exacerbation (Multi) - Primary     Continue inhaler and aerosol treatment  Monitor for shortness of breath, wheezing  O2           Hypertension, essential     Monitor blood pressure         Malaise     Work up pending         Weakness      therapy consult          Medications, treatments, and labs reviewed  Continue medications and treatments as listed in EMR    ISAURO Lorenzana    Scribe Attestation  Charlene GUSTAFSON   attest that this documentation has been prepared under the direction and in the presence of ISAURO Lorenzana    Provider Attestation - Scribe documentation  All medical record entries made by the Scribe were at my direction and personally  dictated by me. I have reviewed the chart and agree that the record accurately reflects my personal performance of the history, physical exam, discussion and plan.

## 2024-07-25 PROBLEM — M79.605 BILATERAL LEG PAIN: Status: RESOLVED | Noted: 2023-11-06 | Resolved: 2024-07-25

## 2024-07-25 PROBLEM — M25.562 LEFT KNEE PAIN: Status: RESOLVED | Noted: 2023-10-09 | Resolved: 2024-07-25

## 2024-07-25 PROBLEM — R07.89 ATYPICAL CHEST PAIN: Status: RESOLVED | Noted: 2024-05-03 | Resolved: 2024-07-25

## 2024-07-25 PROBLEM — R05.1 ACUTE COUGH: Status: RESOLVED | Noted: 2024-01-30 | Resolved: 2024-07-25

## 2024-07-25 PROBLEM — R53.81 MALAISE: Status: RESOLVED | Noted: 2024-07-23 | Resolved: 2024-07-25

## 2024-07-25 PROBLEM — R06.02 SOB (SHORTNESS OF BREATH): Status: RESOLVED | Noted: 2024-02-12 | Resolved: 2024-07-25

## 2024-07-25 PROBLEM — R42 LIGHTHEADEDNESS: Status: RESOLVED | Noted: 2024-07-17 | Resolved: 2024-07-25

## 2024-07-25 PROBLEM — M25.569 KNEE PAIN: Status: RESOLVED | Noted: 2024-06-11 | Resolved: 2024-07-25

## 2024-07-25 PROBLEM — U07.1 COVID: Status: RESOLVED | Noted: 2023-07-28 | Resolved: 2024-07-25

## 2024-07-25 PROBLEM — M79.604 BILATERAL LEG PAIN: Status: RESOLVED | Noted: 2023-11-06 | Resolved: 2024-07-25

## 2024-07-25 PROBLEM — R05.9 COUGH: Status: RESOLVED | Noted: 2023-08-09 | Resolved: 2024-07-25

## 2024-07-30 PROBLEM — Z00.00 WELLNESS EXAMINATION: Status: ACTIVE | Noted: 2024-07-30

## 2024-07-30 NOTE — ASSESSMENT & PLAN NOTE
Removed Rhino Rocket from left nare  Patient tolerated the procedure with no discomfort throughout or after procedure

## 2024-07-30 NOTE — PROGRESS NOTES
PROGRESS NOTE    Subjective   Chief complaint: Ruth Wheatley is a 89 y.o. female who is a long term care patient being seen and evaluated for follow-up on epistaxis.    HPI:  HPI Patient seen and evaluated for follow-up on epistaxis. Patient recently had Rhino Rocket placed in left nare in the ED due to bloody nose.  She presents for removal.      Objective   Vital signs: 86/40 - 97.4-83-16-98%    Physical Exam  Constitutional:       General: She is not in acute distress.  HENT:      Nose:      Comments: Left nare Rhino Rocket  Eyes:      Extraocular Movements: Extraocular movements intact.   Pulmonary:      Effort: Pulmonary effort is normal.   Musculoskeletal:      Cervical back: Neck supple.   Neurological:      Mental Status: She is alert.   Psychiatric:         Mood and Affect: Mood normal.         Behavior: Behavior is cooperative.         Assessment/Plan   Problem List Items Addressed This Visit       COPD without exacerbation (Multi)     Continue inhaler and aerosol treatment  Monitor for shortness of breath, wheezing  O2         Epistaxis - Primary     Removed Rhino Rocket from left nare  Patient tolerated the procedure with no discomfort throughout or after procedure         Hypertension, essential     BP at goal  Monitor blood pressure          Medications, treatments, and labs reviewed  Continue medications and treatments as listed in EMR    Scribe Attestation  IChristoph Scribe   attest that this documentation has been prepared under the direction and in the presence of ISAURO Lorenzana    Provider Attestation - Scribe documentation  All medical record entries made by the Scribe were at my direction and personally dictated by me. I have reviewed the chart and agree that the record accurately reflects my personal performance of the history, physical exam, discussion and plan.   ISAURO Lorenzana

## 2024-08-07 ENCOUNTER — NURSING HOME VISIT (OUTPATIENT)
Dept: POST ACUTE CARE | Facility: EXTERNAL LOCATION | Age: 89
End: 2024-08-07
Payer: MEDICARE

## 2024-08-07 DIAGNOSIS — F32.A DEPRESSION, UNSPECIFIED DEPRESSION TYPE: ICD-10-CM

## 2024-08-07 DIAGNOSIS — I10 HYPERTENSION, ESSENTIAL: ICD-10-CM

## 2024-08-07 DIAGNOSIS — J44.9 COPD WITHOUT EXACERBATION (MULTI): Primary | ICD-10-CM

## 2024-08-07 PROCEDURE — 99309 SBSQ NF CARE MODERATE MDM 30: CPT | Performed by: INTERNAL MEDICINE

## 2024-08-07 NOTE — LETTER
Patient: Ruth Wheatley  : 10/30/1929    Encounter Date: 2024    PROGRESS NOTE    Subjective  Chief complaint: Ruth Wheatley is a 89 y.o. female who is a long term care patient being seen and evaluated for monthly general medical care and follow-up    HPI:  HPI  Patient presents for general medical care and f/u.  Patient seen and examined at bedside.  No issues per nursing.  Patient has no acute complaints.  HTN BP at goal.  Denies chest pain and headache.  COPD stable, denies sob, wheezing, cough.  Patient with diagnosis of depression.  Mood is stable.  Denies feeling down and thoughts of harming self or others.  Mentation at baseline, no acute distress.    Objective  Vital signs: 112/65, 98.3, 72, 18, 97%    Physical Exam  Constitutional:       General: She is not in acute distress.  Eyes:      Extraocular Movements: Extraocular movements intact.   Pulmonary:      Effort: Pulmonary effort is normal.   Musculoskeletal:      Cervical back: Neck supple.   Neurological:      Mental Status: She is alert.   Psychiatric:         Mood and Affect: Mood normal.         Behavior: Behavior is cooperative.         Assessment/Plan  Problem List Items Addressed This Visit       COPD without exacerbation (Multi) - Primary     Continue inhaler and aerosol treatment  Monitor for shortness of breath, wheezing  O2           Depression     Monitor mood  Lexapro          Hypertension, essential     Monitor blood pressure          Medications, treatments, and labs reviewed  Continue medications and treatments as listed in EMR    Scribe Attestation  I, Leslye Gregorio   attest that this documentation has been prepared under the direction and in the presence of Corrie Desouza MD    Provider Attestation - Scribe documentation  All medical record entries made by the Scribe were at my direction and personally dictated by me. I have reviewed the chart and agree that the record accurately reflects my personal performance  of the history, physical exam, discussion and plan.   Corrie Desouza MD            Electronically Signed By: Corrie Desouza MD   8/7/24 12:57 PM

## 2024-08-07 NOTE — PROGRESS NOTES
PROGRESS NOTE    Subjective   Chief complaint: Ruth Wheatley is a 89 y.o. female who is a long term care patient being seen and evaluated for monthly general medical care and follow-up    HPI:  HPI  Patient presents for general medical care and f/u.  Patient seen and examined at bedside.  No issues per nursing.  Patient has no acute complaints.  HTN BP at goal.  Denies chest pain and headache.  COPD stable, denies sob, wheezing, cough.  Patient with diagnosis of depression.  Mood is stable.  Denies feeling down and thoughts of harming self or others.  Mentation at baseline, no acute distress.    Objective   Vital signs: 112/65, 98.3, 72, 18, 97%    Physical Exam  Constitutional:       General: She is not in acute distress.  Eyes:      Extraocular Movements: Extraocular movements intact.   Pulmonary:      Effort: Pulmonary effort is normal.   Musculoskeletal:      Cervical back: Neck supple.   Neurological:      Mental Status: She is alert.   Psychiatric:         Mood and Affect: Mood normal.         Behavior: Behavior is cooperative.         Assessment/Plan   Problem List Items Addressed This Visit       COPD without exacerbation (Multi) - Primary     Continue inhaler and aerosol treatment  Monitor for shortness of breath, wheezing  O2           Depression     Monitor mood  Lexapro          Hypertension, essential     Monitor blood pressure          Medications, treatments, and labs reviewed  Continue medications and treatments as listed in EMR    Scribe Attestation  ISelena Scribe   attest that this documentation has been prepared under the direction and in the presence of Corrie Desouza MD    Provider Attestation - Scribe documentation  All medical record entries made by the Scribe were at my direction and personally dictated by me. I have reviewed the chart and agree that the record accurately reflects my personal performance of the history, physical exam, discussion and plan.   Corrie Desouza,  MD

## 2024-08-20 ENCOUNTER — NURSING HOME VISIT (OUTPATIENT)
Dept: POST ACUTE CARE | Facility: EXTERNAL LOCATION | Age: 89
End: 2024-08-20
Payer: MEDICARE

## 2024-08-20 DIAGNOSIS — J44.9 COPD WITHOUT EXACERBATION (MULTI): ICD-10-CM

## 2024-08-20 DIAGNOSIS — I10 HYPERTENSION, ESSENTIAL: ICD-10-CM

## 2024-08-20 DIAGNOSIS — R53.1 WEAKNESS: Primary | ICD-10-CM

## 2024-08-20 PROCEDURE — 99309 SBSQ NF CARE MODERATE MDM 30: CPT | Performed by: NURSE PRACTITIONER

## 2024-08-20 NOTE — LETTER
Patient: Ruth Wheatley  : 10/30/1929    Encounter Date: 2024    PROGRESS NOTE    Subjective  Chief complaint: Ruth Wheatley is a 89 y.o. female who is a long term care patient being seen and evaluated for generalized weakness    HPI:  HPI  Patient is seen due to patient complaints of generalized weakness, progressively worsening.  Patient is wanting therapy.    Objective  Vital signs: 112/65, 98.3, 72, 18, 98%    Physical Exam  Constitutional:       General: She is not in acute distress.  Eyes:      Extraocular Movements: Extraocular movements intact.   Pulmonary:      Effort: Pulmonary effort is normal.   Musculoskeletal:      Cervical back: Neck supple.   Neurological:      Mental Status: She is alert.   Psychiatric:         Mood and Affect: Mood normal.         Behavior: Behavior is cooperative.         Assessment/Plan  Problem List Items Addressed This Visit       COPD without exacerbation (Multi)     Continue inhaler and aerosol treatment  Monitor for shortness of breath, wheezing  O2           Hypertension, essential     Monitor blood pressure         Weakness - Primary     PT OT consult          Medications, treatments, and labs reviewed  Continue medications and treatments as listed in EMR    Scribe Attestation  I, Leslye Gregorio   attest that this documentation has been prepared under the direction and in the presence of ISAURO Lorenzana    Provider Attestation - Scribe documentation  All medical record entries made by the Scribe were at my direction and personally dictated by me. I have reviewed the chart and agree that the record accurately reflects my personal performance of the history, physical exam, discussion and plan.   ISAURO Lorenzana            Electronically Signed By: ISAURO Lorenzana   24  8:32 PM

## 2024-09-04 NOTE — PROGRESS NOTES
PROGRESS NOTE    Subjective   Chief complaint: Ruth Wheatley is a 89 y.o. female who is a long term care patient being seen and evaluated for generalized weakness    HPI:  HPI  Patient is seen due to patient complaints of generalized weakness, progressively worsening.  Patient is wanting therapy.    Objective   Vital signs: 112/65, 98.3, 72, 18, 98%    Physical Exam  Constitutional:       General: She is not in acute distress.  Eyes:      Extraocular Movements: Extraocular movements intact.   Pulmonary:      Effort: Pulmonary effort is normal.   Musculoskeletal:      Cervical back: Neck supple.   Neurological:      Mental Status: She is alert.   Psychiatric:         Mood and Affect: Mood normal.         Behavior: Behavior is cooperative.         Assessment/Plan   Problem List Items Addressed This Visit       COPD without exacerbation (Multi)     Continue inhaler and aerosol treatment  Monitor for shortness of breath, wheezing  O2           Hypertension, essential     Monitor blood pressure         Weakness - Primary     PT OT consult          Medications, treatments, and labs reviewed  Continue medications and treatments as listed in EMR    Scribe Attestation  Selena GUSTAFSON Scribe   attest that this documentation has been prepared under the direction and in the presence of ISAURO Lorenzana    Provider Attestation - Scribe documentation  All medical record entries made by the Scribe were at my direction and personally dictated by me. I have reviewed the chart and agree that the record accurately reflects my personal performance of the history, physical exam, discussion and plan.   ISAURO Lorenzana

## 2024-09-06 ENCOUNTER — NURSING HOME VISIT (OUTPATIENT)
Dept: POST ACUTE CARE | Facility: EXTERNAL LOCATION | Age: 89
End: 2024-09-06
Payer: MEDICARE

## 2024-09-06 DIAGNOSIS — I10 HYPERTENSION, ESSENTIAL: ICD-10-CM

## 2024-09-06 DIAGNOSIS — J44.9 COPD WITHOUT EXACERBATION (MULTI): Primary | ICD-10-CM

## 2024-09-06 DIAGNOSIS — F32.A DEPRESSION, UNSPECIFIED DEPRESSION TYPE: ICD-10-CM

## 2024-09-06 PROCEDURE — 99309 SBSQ NF CARE MODERATE MDM 30: CPT | Performed by: INTERNAL MEDICINE

## 2024-09-06 NOTE — PROGRESS NOTES
PROGRESS NOTE    Subjective   Chief complaint: Ruth Wheatley is a 89 y.o. female who is a long term care patient being seen and evaluated for monthly general medical care and follow-up    HPI:  HPI  Patient presents for general medical care and f/u.  Patient seen and examined at bedside.  No issues per nursing.  Patient has no acute complaints. COPD stable, denies sob, wheezing, cough.  Patient does require assistance with ADLs.  HTN BP at goal.  Denies chest pain and headache.  Patient with diagnosis of depression.  Mood is stable.  Denies feeling down and thoughts of harming self or others.  Mentation at baseline, no acute distress.    Objective   Vital signs: 120/78, 97.6, 78, 18, 98%    Physical Exam  Constitutional:       General: She is not in acute distress.  Eyes:      Extraocular Movements: Extraocular movements intact.   Pulmonary:      Effort: Pulmonary effort is normal.   Musculoskeletal:      Cervical back: Neck supple.   Neurological:      Mental Status: She is alert.   Psychiatric:         Mood and Affect: Mood normal.         Behavior: Behavior is cooperative.         Assessment/Plan   Problem List Items Addressed This Visit       COPD without exacerbation (Multi) - Primary     Continue inhaler and aerosol treatment  Monitor for shortness of breath, wheezing  O2           Depression     Monitor mood  Continue antidepressant as currently ordered         Hypertension, essential     Monitor blood pressure          Medications, treatments, and labs reviewed  Continue medications and treatments as listed in EMR    Scribe Attestation  I, Leslye Gregorio   attest that this documentation has been prepared under the direction and in the presence of Corrie Desouza MD    Provider Attestation - Scribe documentation  All medical record entries made by the Scribe were at my direction and personally dictated by me. I have reviewed the chart and agree that the record accurately reflects my personal  performance of the history, physical exam, discussion and plan.   Corrie Desouza MD

## 2024-09-06 NOTE — LETTER
Patient: Ruth Wheatley  : 10/30/1929    Encounter Date: 2024    PROGRESS NOTE    Subjective  Chief complaint: Ruth Wheatley is a 89 y.o. female who is a long term care patient being seen and evaluated for monthly general medical care and follow-up    HPI:  HPI  Patient presents for general medical care and f/u.  Patient seen and examined at bedside.  No issues per nursing.  Patient has no acute complaints. COPD stable, denies sob, wheezing, cough.  Patient does require assistance with ADLs.  HTN BP at goal.  Denies chest pain and headache.  Patient with diagnosis of depression.  Mood is stable.  Denies feeling down and thoughts of harming self or others.  Mentation at baseline, no acute distress.    Objective  Vital signs: 120/78, 97.6, 78, 18, 98%    Physical Exam  Constitutional:       General: She is not in acute distress.  Eyes:      Extraocular Movements: Extraocular movements intact.   Pulmonary:      Effort: Pulmonary effort is normal.   Musculoskeletal:      Cervical back: Neck supple.   Neurological:      Mental Status: She is alert.   Psychiatric:         Mood and Affect: Mood normal.         Behavior: Behavior is cooperative.         Assessment/Plan  Problem List Items Addressed This Visit       COPD without exacerbation (Multi) - Primary     Continue inhaler and aerosol treatment  Monitor for shortness of breath, wheezing  O2           Depression     Monitor mood  Continue antidepressant as currently ordered         Hypertension, essential     Monitor blood pressure          Medications, treatments, and labs reviewed  Continue medications and treatments as listed in EMR    Scribe Attestation  Selena GUSTAFSON Scribe   attest that this documentation has been prepared under the direction and in the presence of Corrie Desouza MD    Provider Attestation - Scribe documentation  All medical record entries made by the Scribe were at my direction and personally dictated by me. I have reviewed  the chart and agree that the record accurately reflects my personal performance of the history, physical exam, discussion and plan.   Corrie Desouza MD            Electronically Signed By: Corrie Desouza MD   9/8/24  5:45 PM

## 2024-10-01 ENCOUNTER — NURSING HOME VISIT (OUTPATIENT)
Dept: POST ACUTE CARE | Facility: EXTERNAL LOCATION | Age: 89
End: 2024-10-01
Payer: MEDICARE

## 2024-10-01 DIAGNOSIS — R53.1 WEAKNESS: ICD-10-CM

## 2024-10-01 DIAGNOSIS — I10 HYPERTENSION, ESSENTIAL: ICD-10-CM

## 2024-10-01 DIAGNOSIS — R60.0 LOCALIZED EDEMA: Primary | ICD-10-CM

## 2024-10-01 DIAGNOSIS — R63.0 POOR APPETITE: ICD-10-CM

## 2024-10-01 PROCEDURE — 99308 SBSQ NF CARE LOW MDM 20: CPT | Performed by: NURSE PRACTITIONER

## 2024-10-01 NOTE — LETTER
Patient: Ruth Wheatley  : 10/30/1929    Encounter Date: 10/01/2024    PROGRESS NOTE    Subjective  Chief complaint: Ruth Wheatley is a 89 y.o. female who is a long term care patient being seen and evaluated for lower extremity edema, generalized weakness, and poor appetite.    HPI:  HPIPatient presents with concern for lower extremity edema, generalized weakness, and poor appetite. She states she does not want to wear compression stockings but perez elevate legs as able. Patient states that she feels that she is becoming progressive weaker and feels she would benefit from therapy. In addition, she feels her appetite is poor. She denies abdominal pain n/v and is willing to take supplements. No other concerns at this time.      Objective  Vital signs: 120/78-97.6-78-18-98%    Physical Exam  Constitutional:       General: She is not in acute distress.  Eyes:      Extraocular Movements: Extraocular movements intact.   Pulmonary:      Effort: Pulmonary effort is normal.      Breath sounds: Decreased breath sounds present.   Musculoskeletal:      Cervical back: Neck supple.      Right lower leg: Edema present.      Left lower leg: Edema present.   Neurological:      Mental Status: She is alert.   Psychiatric:         Mood and Affect: Mood normal.         Assessment/Plan  Problem List Items Addressed This Visit       Edema - Primary     Elevate legs as able         Hypertension, essential     Monitor blood pressure  BP at goal         Poor appetite     start Ensure         Weakness     PT/OT consult          Medications, treatments, and labs reviewed  Continue medications and treatments as listed in EMR    Scribe Attestation  Christoph GUSTAFSON Scribe   attest that this documentation has been prepared under the direction and in the presence of JANE Lorenzana-CNP    Provider Attestation - Scribe documentation  All medical record entries made by the Scribe were at my direction and personally dictated by me. I  have reviewed the chart and agree that the record accurately reflects my personal performance of the history, physical exam, discussion and plan.   ISAURO Lorenzana            Electronically Signed By: ISAURO Lorenzana   10/27/24  4:00 PM

## 2024-10-04 ENCOUNTER — NURSING HOME VISIT (OUTPATIENT)
Dept: POST ACUTE CARE | Facility: EXTERNAL LOCATION | Age: 89
End: 2024-10-04
Payer: COMMERCIAL

## 2024-10-04 DIAGNOSIS — J44.9 COPD WITHOUT EXACERBATION (MULTI): Primary | ICD-10-CM

## 2024-10-04 DIAGNOSIS — I10 HYPERTENSION, ESSENTIAL: ICD-10-CM

## 2024-10-04 DIAGNOSIS — F32.A DEPRESSION, UNSPECIFIED DEPRESSION TYPE: ICD-10-CM

## 2024-10-04 NOTE — LETTER
Patient: Ruth Wheatley  : 10/30/1929    Encounter Date: 10/04/2024    PROGRESS NOTE    Subjective  Chief complaint: Ruth Wheatley is a 89 y.o. female who is a long term care patient being seen and evaluated for monthly general medical care and follow-up    HPI:  HPI  Patient presents for general medical care and f/u.  Patient seen and examined at bedside.  No issues per nursing.  Patient has no acute complaints.  COPD stable, denies sob, wheezing, cough.  Patient with diagnosis of depression.  Mood is stable.  Denies feeling down and thoughts of harming self or others.  HTN BP at goal.  Denies chest pain and headache.  Patient does require assistance with ADLs.  Mentation at baseline, no acute distress.    Objective  Vital signs: 120/78, 97.6, 71, 18, 98%    Physical Exam  Constitutional:       General: She is not in acute distress.  Eyes:      Extraocular Movements: Extraocular movements intact.   Pulmonary:      Effort: Pulmonary effort is normal.   Musculoskeletal:      Cervical back: Neck supple.   Neurological:      Mental Status: She is alert.   Psychiatric:         Mood and Affect: Mood normal.         Behavior: Behavior is cooperative.         Assessment/Plan  Problem List Items Addressed This Visit       COPD without exacerbation (Multi) - Primary     Continue inhaler and aerosol treatment  Monitor for shortness of breath, wheezing  O2           Depression     Monitor mood  Continue antidepressant as currently ordered         Hypertension, essential     Monitor blood pressure  BP at goal          Medications, treatments, and labs reviewed  Continue medications and treatments as listed in EMR    Scribe Attestation  Selena GUSTAFSON Scribe   attest that this documentation has been prepared under the direction and in the presence of Corrie Desouza MD    Provider Attestation - Scribe documentation  All medical record entries made by the Scribe were at my direction and personally dictated by me. I  have reviewed the chart and agree that the record accurately reflects my personal performance of the history, physical exam, discussion and plan.   Corrie Desouza MD            Electronically Signed By: Corrie Desouza MD   10/5/24  1:32 PM

## 2024-10-04 NOTE — PROGRESS NOTES
PROGRESS NOTE    Subjective   Chief complaint: Ruth Wheatley is a 89 y.o. female who is a long term care patient being seen and evaluated for monthly general medical care and follow-up    HPI:  HPI  Patient presents for general medical care and f/u.  Patient seen and examined at bedside.  No issues per nursing.  Patient has no acute complaints.  COPD stable, denies sob, wheezing, cough.  Patient with diagnosis of depression.  Mood is stable.  Denies feeling down and thoughts of harming self or others.  HTN BP at goal.  Denies chest pain and headache.  Patient does require assistance with ADLs.  Mentation at baseline, no acute distress.    Objective   Vital signs: 120/78, 97.6, 71, 18, 98%    Physical Exam  Constitutional:       General: She is not in acute distress.  Eyes:      Extraocular Movements: Extraocular movements intact.   Pulmonary:      Effort: Pulmonary effort is normal.   Musculoskeletal:      Cervical back: Neck supple.   Neurological:      Mental Status: She is alert.   Psychiatric:         Mood and Affect: Mood normal.         Behavior: Behavior is cooperative.         Assessment/Plan   Problem List Items Addressed This Visit       COPD without exacerbation (Multi) - Primary     Continue inhaler and aerosol treatment  Monitor for shortness of breath, wheezing  O2           Depression     Monitor mood  Continue antidepressant as currently ordered         Hypertension, essential     Monitor blood pressure  BP at goal          Medications, treatments, and labs reviewed  Continue medications and treatments as listed in EMR    Scribe Attestation  I, Leslye Gregorio   attest that this documentation has been prepared under the direction and in the presence of Corrie Desouza MD    Provider Attestation - Scribe documentation  All medical record entries made by the Scribe were at my direction and personally dictated by me. I have reviewed the chart and agree that the record accurately reflects my  personal performance of the history, physical exam, discussion and plan.   Corrie Desouza MD

## 2024-10-16 ENCOUNTER — NURSING HOME VISIT (OUTPATIENT)
Dept: POST ACUTE CARE | Facility: EXTERNAL LOCATION | Age: 89
End: 2024-10-16
Payer: COMMERCIAL

## 2024-10-16 DIAGNOSIS — R53.1 WEAKNESS: Primary | ICD-10-CM

## 2024-10-16 DIAGNOSIS — I10 HYPERTENSION, ESSENTIAL: ICD-10-CM

## 2024-10-16 DIAGNOSIS — J44.9 COPD WITHOUT EXACERBATION (MULTI): ICD-10-CM

## 2024-10-16 PROCEDURE — 99308 SBSQ NF CARE LOW MDM 20: CPT | Performed by: INTERNAL MEDICINE

## 2024-10-16 NOTE — PROGRESS NOTES
PROGRESS NOTE    Subjective   Chief complaint: Ruth Wheatley is a 89 y.o. female who is a long term care patient being seen and evaluated for weakness.    HPI:  HPI  Patient is currently working with therapy due to generalized weakness.  OT is working with patient, working on performing therapeutic exercises and activities to improve strength, endurance and range of motion for independence with functional mobility and task.  Patient appears to be in no acute distress.  Nursing staff voicing no new concerns.  Patient denies complaints at this time.    Objective   Vital signs: 120/78, 97.6, 75, 18, 98%    Physical Exam  Constitutional:       General: She is not in acute distress.  Eyes:      Extraocular Movements: Extraocular movements intact.   Pulmonary:      Effort: Pulmonary effort is normal.   Musculoskeletal:      Cervical back: Neck supple.   Neurological:      Mental Status: She is alert.   Psychiatric:         Mood and Affect: Mood normal.         Behavior: Behavior is cooperative.         Assessment/Plan   Problem List Items Addressed This Visit       COPD without exacerbation (Multi)     Continue inhaler and aerosol treatment  Monitor for shortness of breath, wheezing  O2           Hypertension, essential     Monitor blood pressure  BP at goal         Weakness - Primary     Continue to work with OT          Medications, treatments, and labs reviewed  Continue medications and treatments as listed in EMR    Scribe Attestation  I, Leslye Gregorio   attest that this documentation has been prepared under the direction and in the presence of Corrie Desouza MD    Provider Attestation - Scribe documentation  All medical record entries made by the Scribe were at my direction and personally dictated by me. I have reviewed the chart and agree that the record accurately reflects my personal performance of the history, physical exam, discussion and plan.   Corrie Desouza MD

## 2024-10-16 NOTE — LETTER
Patient: Ruth Wheatley  : 10/30/1929    Encounter Date: 10/16/2024    PROGRESS NOTE    Subjective  Chief complaint: Ruth Wheatley is a 89 y.o. female who is a long term care patient being seen and evaluated for weakness.    HPI:  HPI  Patient is currently working with therapy due to generalized weakness.  OT is working with patient, working on performing therapeutic exercises and activities to improve strength, endurance and range of motion for independence with functional mobility and task.  Patient appears to be in no acute distress.  Nursing staff voicing no new concerns.  Patient denies complaints at this time.    Objective  Vital signs: 120/78, 97.6, 75, 18, 98%    Physical Exam  Constitutional:       General: She is not in acute distress.  Eyes:      Extraocular Movements: Extraocular movements intact.   Pulmonary:      Effort: Pulmonary effort is normal.   Musculoskeletal:      Cervical back: Neck supple.   Neurological:      Mental Status: She is alert.   Psychiatric:         Mood and Affect: Mood normal.         Behavior: Behavior is cooperative.         Assessment/Plan  Problem List Items Addressed This Visit       COPD without exacerbation (Multi)     Continue inhaler and aerosol treatment  Monitor for shortness of breath, wheezing  O2           Hypertension, essential     Monitor blood pressure  BP at goal         Weakness - Primary     Continue to work with OT          Medications, treatments, and labs reviewed  Continue medications and treatments as listed in EMR    Scribe Attestation  I, Leslye Gregorio   attest that this documentation has been prepared under the direction and in the presence of Corrie Desouza MD    Provider Attestation - Scribe documentation  All medical record entries made by the Scribe were at my direction and personally dictated by me. I have reviewed the chart and agree that the record accurately reflects my personal performance of the history, physical exam,  discussion and plan.   Corrie Desouza MD            Electronically Signed By: Corrie Desouza MD   10/17/24 10:28 AM

## 2024-10-21 ENCOUNTER — NURSING HOME VISIT (OUTPATIENT)
Dept: POST ACUTE CARE | Facility: EXTERNAL LOCATION | Age: 89
End: 2024-10-21
Payer: COMMERCIAL

## 2024-10-21 DIAGNOSIS — R53.1 WEAKNESS: Primary | ICD-10-CM

## 2024-10-21 DIAGNOSIS — I10 HYPERTENSION, ESSENTIAL: ICD-10-CM

## 2024-10-21 DIAGNOSIS — J44.9 COPD WITHOUT EXACERBATION (MULTI): ICD-10-CM

## 2024-10-21 PROBLEM — R63.0 POOR APPETITE: Status: ACTIVE | Noted: 2024-10-21

## 2024-10-21 PROCEDURE — 99308 SBSQ NF CARE LOW MDM 20: CPT | Performed by: INTERNAL MEDICINE

## 2024-10-21 NOTE — LETTER
Patient: Ruth Wheatley  : 10/30/1929    Encounter Date: 10/21/2024    PROGRESS NOTE    Subjective  Chief complaint: Ruth Wheatley is a 89 y.o. female who is a long term care patient being seen and evaluated for weakness.    HPI:  HPI  Patient is currently working with therapy, working with OT.  Therapy is working patient due to increased weakness to lower extremities.  Patient does continue to work towards goals.  Patient seen and examined at the bedside, appears to be in no acute distress.    Objective  Vital signs: 120/78, 97.6, 70, 18, 98%    Physical Exam  Constitutional:       General: She is not in acute distress.  Eyes:      Extraocular Movements: Extraocular movements intact.   Pulmonary:      Effort: Pulmonary effort is normal.   Musculoskeletal:      Cervical back: Neck supple.   Neurological:      Mental Status: She is alert.      Motor: Weakness present.   Psychiatric:         Mood and Affect: Mood normal.         Behavior: Behavior is cooperative.         Assessment/Plan  Problem List Items Addressed This Visit       COPD without exacerbation (Multi)     Continue inhaler and aerosol treatment  Monitor for shortness of breath, wheezing  O2           Hypertension, essential     Monitor blood pressure  BP at goal         Weakness - Primary     Continue therapy, work with OT towards goals          Medications, treatments, and labs reviewed  Continue medications and treatments as listed in EMR    Scribe Attestation  I, Leslye Gregorio   attest that this documentation has been prepared under the direction and in the presence of Corrie Desouza MD    Provider Attestation - Scribe documentation  All medical record entries made by the Scribe were at my direction and personally dictated by me. I have reviewed the chart and agree that the record accurately reflects my personal performance of the history, physical exam, discussion and plan.   Corrie Desouza MD            Electronically Signed By:  Corrie Desouza MD   10/22/24  2:10 PM

## 2024-10-21 NOTE — PROGRESS NOTES
PROGRESS NOTE    Subjective   Chief complaint: Ruth Wheatley is a 89 y.o. female who is a long term care patient being seen and evaluated for weakness.    HPI:  HPI  Patient is currently working with therapy, working with OT.  Therapy is working patient due to increased weakness to lower extremities.  Patient does continue to work towards goals.  Patient seen and examined at the bedside, appears to be in no acute distress.    Objective   Vital signs: 120/78, 97.6, 70, 18, 98%    Physical Exam  Constitutional:       General: She is not in acute distress.  Eyes:      Extraocular Movements: Extraocular movements intact.   Pulmonary:      Effort: Pulmonary effort is normal.   Musculoskeletal:      Cervical back: Neck supple.   Neurological:      Mental Status: She is alert.      Motor: Weakness present.   Psychiatric:         Mood and Affect: Mood normal.         Behavior: Behavior is cooperative.         Assessment/Plan   Problem List Items Addressed This Visit       COPD without exacerbation (Multi)     Continue inhaler and aerosol treatment  Monitor for shortness of breath, wheezing  O2           Hypertension, essential     Monitor blood pressure  BP at goal         Weakness - Primary     Continue therapy, work with OT towards goals          Medications, treatments, and labs reviewed  Continue medications and treatments as listed in EMR    Scribe Attestation  I, Selena Luna Scribe   attest that this documentation has been prepared under the direction and in the presence of Corrie Desouza MD    Provider Attestation - Scribe documentation  All medical record entries made by the Scribe were at my direction and personally dictated by me. I have reviewed the chart and agree that the record accurately reflects my personal performance of the history, physical exam, discussion and plan.   Corrie Desouza MD

## 2024-10-22 NOTE — PROGRESS NOTES
PROGRESS NOTE    Subjective   Chief complaint: Ruth Wheatley is a 89 y.o. female who is a long term care patient being seen and evaluated for lower extremity edema, generalized weakness, and poor appetite.    HPI:  HPIPatient presents with concern for lower extremity edema, generalized weakness, and poor appetite. She states she does not want to wear compression stockings but perez elevate legs as able. Patient states that she feels that she is becoming progressive weaker and feels she would benefit from therapy. In addition, she feels her appetite is poor. She denies abdominal pain n/v and is willing to take supplements. No other concerns at this time.      Objective   Vital signs: 120/78-97.6-78-18-98%    Physical Exam  Constitutional:       General: She is not in acute distress.  Eyes:      Extraocular Movements: Extraocular movements intact.   Pulmonary:      Effort: Pulmonary effort is normal.      Breath sounds: Decreased breath sounds present.   Musculoskeletal:      Cervical back: Neck supple.      Right lower leg: Edema present.      Left lower leg: Edema present.   Neurological:      Mental Status: She is alert.   Psychiatric:         Mood and Affect: Mood normal.         Assessment/Plan   Problem List Items Addressed This Visit       Edema - Primary     Elevate legs as able         Hypertension, essential     Monitor blood pressure  BP at goal         Poor appetite     start Ensure         Weakness     PT/OT consult          Medications, treatments, and labs reviewed  Continue medications and treatments as listed in EMR    Scribe Attestation  IChristoph Scribe   attest that this documentation has been prepared under the direction and in the presence of JANE Lorenzana-CNP    Provider Attestation - Scribe documentation  All medical record entries made by the Scribe were at my direction and personally dictated by me. I have reviewed the chart and agree that the record accurately reflects  my personal performance of the history, physical exam, discussion and plan.   Gi Piña, APRN-CNP

## 2024-10-30 ENCOUNTER — NURSING HOME VISIT (OUTPATIENT)
Dept: POST ACUTE CARE | Facility: EXTERNAL LOCATION | Age: 89
End: 2024-10-30
Payer: MEDICARE

## 2024-10-30 DIAGNOSIS — R53.1 WEAKNESS: Primary | ICD-10-CM

## 2024-10-30 DIAGNOSIS — I10 HYPERTENSION, ESSENTIAL: ICD-10-CM

## 2024-10-30 DIAGNOSIS — J44.9 COPD WITHOUT EXACERBATION (MULTI): ICD-10-CM

## 2024-10-30 PROCEDURE — 99308 SBSQ NF CARE LOW MDM 20: CPT | Performed by: INTERNAL MEDICINE

## 2024-11-06 ENCOUNTER — NURSING HOME VISIT (OUTPATIENT)
Dept: POST ACUTE CARE | Facility: EXTERNAL LOCATION | Age: 89
End: 2024-11-06
Payer: MEDICARE

## 2024-11-06 DIAGNOSIS — F32.A DEPRESSION, UNSPECIFIED DEPRESSION TYPE: ICD-10-CM

## 2024-11-06 DIAGNOSIS — J44.9 COPD WITHOUT EXACERBATION (MULTI): Primary | ICD-10-CM

## 2024-11-06 DIAGNOSIS — I10 HYPERTENSION, ESSENTIAL: ICD-10-CM

## 2024-11-06 PROCEDURE — 99309 SBSQ NF CARE MODERATE MDM 30: CPT | Performed by: INTERNAL MEDICINE

## 2024-11-06 NOTE — LETTER
Patient: Ruth Wheatley  : 10/30/1929    Encounter Date: 2024    PROGRESS NOTE    Subjective  Chief complaint: Ruth Wheatley is a 89 y.o. female who is a long term care patient being seen and evaluated for monthly general medical care and follow-up    HPI:  HPI  Patient presents for general medical care and f/u.  Patient seen and examined at bedside.  No issues per nursing.  Patient has no acute complaints.  COPD stable, denies sob, wheezing, cough.  Patient with diagnosis of depression.  Mood is stable.  Denies feeling down and thoughts of harming self or others.  HTN BP at goal.  Denies chest pain and headache.  Patient does require assistance with ADLs.  Mentation at baseline, no acute distress.    Objective  Vital signs: 120/78, 97.6, 72, 16, 94%    Physical Exam  Constitutional:       General: She is not in acute distress.  Eyes:      Extraocular Movements: Extraocular movements intact.   Pulmonary:      Effort: Pulmonary effort is normal.   Musculoskeletal:      Cervical back: Neck supple.   Neurological:      Mental Status: She is alert.   Psychiatric:         Mood and Affect: Mood normal.         Behavior: Behavior is cooperative.         Assessment/Plan  Problem List Items Addressed This Visit       COPD without exacerbation (Multi) - Primary     Continue inhaler and aerosol treatment  Monitor for shortness of breath, wheezing  O2         Depression     Monitor mood  Continue antidepressant as currently ordered         Hypertension, essential     Monitor blood pressure  BP at goal          Medications, treatments, and labs reviewed  Continue medications and treatments as listed in EMR    Scribe Attestation  Selena GUSTAFSON Scribe   attest that this documentation has been prepared under the direction and in the presence of Corrie Desouza MD    Provider Attestation - Scribe documentation  All medical record entries made by the Scribe were at my direction and personally dictated by me. I have  reviewed the chart and agree that the record accurately reflects my personal performance of the history, physical exam, discussion and plan.   Corrie Desouza MD            Electronically Signed By: Corrie Desouza MD   11/7/24 11:48 AM

## 2024-11-06 NOTE — PROGRESS NOTES
PROGRESS NOTE    Subjective   Chief complaint: Ruth Wheatley is a 89 y.o. female who is a long term care patient being seen and evaluated for monthly general medical care and follow-up    HPI:  HPI  Patient presents for general medical care and f/u.  Patient seen and examined at bedside.  No issues per nursing.  Patient has no acute complaints.  COPD stable, denies sob, wheezing, cough.  Patient with diagnosis of depression.  Mood is stable.  Denies feeling down and thoughts of harming self or others.  HTN BP at goal.  Denies chest pain and headache.  Patient does require assistance with ADLs.  Mentation at baseline, no acute distress.    Objective   Vital signs: 120/78, 97.6, 72, 16, 94%    Physical Exam  Constitutional:       General: She is not in acute distress.  Eyes:      Extraocular Movements: Extraocular movements intact.   Pulmonary:      Effort: Pulmonary effort is normal.   Musculoskeletal:      Cervical back: Neck supple.   Neurological:      Mental Status: She is alert.   Psychiatric:         Mood and Affect: Mood normal.         Behavior: Behavior is cooperative.         Assessment/Plan   Problem List Items Addressed This Visit       COPD without exacerbation (Multi) - Primary     Continue inhaler and aerosol treatment  Monitor for shortness of breath, wheezing  O2         Depression     Monitor mood  Continue antidepressant as currently ordered         Hypertension, essential     Monitor blood pressure  BP at goal          Medications, treatments, and labs reviewed  Continue medications and treatments as listed in EMR    Scribe Attestation  I, Leslye Gregorio   attest that this documentation has been prepared under the direction and in the presence of Corrie Desouza MD    Provider Attestation - Scribe documentation  All medical record entries made by the Scribe were at my direction and personally dictated by me. I have reviewed the chart and agree that the record accurately reflects my  personal performance of the history, physical exam, discussion and plan.   Corrie Desouza MD

## 2024-11-08 ENCOUNTER — NURSING HOME VISIT (OUTPATIENT)
Dept: POST ACUTE CARE | Facility: EXTERNAL LOCATION | Age: 89
End: 2024-11-08
Payer: MEDICARE

## 2024-11-08 DIAGNOSIS — J44.9 COPD WITHOUT EXACERBATION (MULTI): ICD-10-CM

## 2024-11-08 DIAGNOSIS — I10 HYPERTENSION, ESSENTIAL: ICD-10-CM

## 2024-11-08 DIAGNOSIS — R53.1 WEAKNESS: Primary | ICD-10-CM

## 2024-11-08 PROCEDURE — 99308 SBSQ NF CARE LOW MDM 20: CPT | Performed by: INTERNAL MEDICINE

## 2024-11-08 NOTE — LETTER
Patient: Ruth Wheatley  : 10/30/1929    Encounter Date: 2024    PROGRESS NOTE    Subjective  Chief complaint: Ruth Wheatley is a 89 y.o. female who is a long term care patient being seen and evaluated for weakness    HPI:  HPI  Patient is currently working with therapy due to generalized weakness.  Therapy is working with patient on performing therapeutic exercise and activities to improve strength, endurance and range of motion.  Patient appears to be in no acute distress.  Denies chest pain, shortness of breath, nausea vomiting fever chills.    Objective  Vital signs: 133/52, 97.9, 72, 18, 96%    Physical Exam  Constitutional:       General: She is not in acute distress.  Eyes:      Extraocular Movements: Extraocular movements intact.   Pulmonary:      Effort: Pulmonary effort is normal.   Musculoskeletal:      Cervical back: Neck supple.   Neurological:      Mental Status: She is alert.   Psychiatric:         Mood and Affect: Mood normal.         Behavior: Behavior is cooperative.         Assessment/Plan  Problem List Items Addressed This Visit       COPD without exacerbation (Multi)     Continue inhaler and aerosol treatment  Monitor for shortness of breath, wheezing  O2         Hypertension, essential     Monitor blood pressure           Weakness - Primary     Work towards goals with therapy, OT          Medications, treatments, and labs reviewed  Continue medications and treatments as listed in EMR    Scribe Attestation  I, Leslye Gregorio   attest that this documentation has been prepared under the direction and in the presence of Corrie Desouza MD    Provider Attestation - Scribe documentation  All medical record entries made by the Scribe were at my direction and personally dictated by me. I have reviewed the chart and agree that the record accurately reflects my personal performance of the history, physical exam, discussion and plan.   Corrie Desouza MD            Electronically  Signed By: Corrie Desouza MD   11/9/24  8:58 AM

## 2024-11-08 NOTE — PROGRESS NOTES
PROGRESS NOTE    Subjective   Chief complaint: Ruth Wheatley is a 89 y.o. female who is a long term care patient being seen and evaluated for weakness    HPI:  HPI  Patient is currently working with therapy due to generalized weakness.  Therapy is working with patient on performing therapeutic exercise and activities to improve strength, endurance and range of motion.  Patient appears to be in no acute distress.  Denies chest pain, shortness of breath, nausea vomiting fever chills.    Objective   Vital signs: 133/52, 97.9, 72, 18, 96%    Physical Exam  Constitutional:       General: She is not in acute distress.  Eyes:      Extraocular Movements: Extraocular movements intact.   Pulmonary:      Effort: Pulmonary effort is normal.   Musculoskeletal:      Cervical back: Neck supple.   Neurological:      Mental Status: She is alert.   Psychiatric:         Mood and Affect: Mood normal.         Behavior: Behavior is cooperative.         Assessment/Plan   Problem List Items Addressed This Visit       COPD without exacerbation (Multi)     Continue inhaler and aerosol treatment  Monitor for shortness of breath, wheezing  O2         Hypertension, essential     Monitor blood pressure           Weakness - Primary     Work towards goals with therapy, OT          Medications, treatments, and labs reviewed  Continue medications and treatments as listed in EMR    Scribe Attestation  I, Brooklynn Gregorioibdesean   attest that this documentation has been prepared under the direction and in the presence of Corrie Desouza MD    Provider Attestation - Scribe documentation  All medical record entries made by the Scribe were at my direction and personally dictated by me. I have reviewed the chart and agree that the record accurately reflects my personal performance of the history, physical exam, discussion and plan.   Corrie Desouza MD

## 2024-11-13 ENCOUNTER — NURSING HOME VISIT (OUTPATIENT)
Dept: POST ACUTE CARE | Facility: EXTERNAL LOCATION | Age: 89
End: 2024-11-13
Payer: MEDICARE

## 2024-11-13 DIAGNOSIS — R53.1 WEAKNESS: Primary | ICD-10-CM

## 2024-11-13 DIAGNOSIS — J44.9 COPD WITHOUT EXACERBATION (MULTI): ICD-10-CM

## 2024-11-13 DIAGNOSIS — I10 HYPERTENSION, ESSENTIAL: ICD-10-CM

## 2024-11-13 PROCEDURE — 99308 SBSQ NF CARE LOW MDM 20: CPT | Performed by: INTERNAL MEDICINE

## 2024-11-13 NOTE — PROGRESS NOTES
PROGRESS NOTE    Subjective   Chief complaint: Ruth Wheatley is a 89 y.o. female who is a long term care patient being seen and evaluated for weakness.    HPI:  HPI  Patient is currently working with therapy due to generalized weakness.  Therapies continue to work with patient on performing therapeutic exercise and activities to improve strength endurance range of motion for functional tasks.  Patient appears to be in no acute distress.  Denies chest pain, shortness of breath, nausea vomiting fever chills.  Nursing staff voicing no new concerns.    Objective   Vital signs: 133/52, 97.9, 84, 18, 96%    Physical Exam  Constitutional:       General: She is not in acute distress.  Eyes:      Extraocular Movements: Extraocular movements intact.   Cardiovascular:      Rate and Rhythm: Normal rate and regular rhythm.   Pulmonary:      Effort: Pulmonary effort is normal.      Breath sounds: Normal breath sounds.   Abdominal:      General: Bowel sounds are normal.      Palpations: Abdomen is soft.   Musculoskeletal:      Cervical back: Neck supple.      Right lower leg: No edema.      Left lower leg: No edema.   Neurological:      Mental Status: She is alert.      Motor: Weakness present.   Psychiatric:         Mood and Affect: Mood normal.         Behavior: Behavior is cooperative.         Assessment/Plan   Problem List Items Addressed This Visit       COPD without exacerbation (Multi)     Continue inhaler and aerosol treatment  Monitor for shortness of breath, wheezing  O2         Hypertension, essential     Monitor blood pressure           Weakness - Primary     Continue with OT, work towards goals          Medications, treatments, and labs reviewed  Continue medications and treatments as listed in EMR    Scribe Attestation  I, Brooklnyn Gregorioibdesean   attest that this documentation has been prepared under the direction and in the presence of Corrie Desouza MD    Provider Attestation - Scribe documentation  All  medical record entries made by the Scribe were at my direction and personally dictated by me. I have reviewed the chart and agree that the record accurately reflects my personal performance of the history, physical exam, discussion and plan.   Corrie Desouza MD

## 2024-11-13 NOTE — LETTER
Patient: Ruth Wheatley  : 10/30/1929    Encounter Date: 2024    PROGRESS NOTE    Subjective  Chief complaint: Ruth Wheatley is a 89 y.o. female who is a long term care patient being seen and evaluated for weakness.    HPI:  HPI  Patient is currently working with therapy due to generalized weakness.  Therapies continue to work with patient on performing therapeutic exercise and activities to improve strength endurance range of motion for functional tasks.  Patient appears to be in no acute distress.  Denies chest pain, shortness of breath, nausea vomiting fever chills.  Nursing staff voicing no new concerns.    Objective  Vital signs: 133/52, 97.9, 84, 18, 96%    Physical Exam  Constitutional:       General: She is not in acute distress.  Eyes:      Extraocular Movements: Extraocular movements intact.   Cardiovascular:      Rate and Rhythm: Normal rate and regular rhythm.   Pulmonary:      Effort: Pulmonary effort is normal.      Breath sounds: Normal breath sounds.   Abdominal:      General: Bowel sounds are normal.      Palpations: Abdomen is soft.   Musculoskeletal:      Cervical back: Neck supple.      Right lower leg: No edema.      Left lower leg: No edema.   Neurological:      Mental Status: She is alert.      Motor: Weakness present.   Psychiatric:         Mood and Affect: Mood normal.         Behavior: Behavior is cooperative.         Assessment/Plan  Problem List Items Addressed This Visit       COPD without exacerbation (Multi)     Continue inhaler and aerosol treatment  Monitor for shortness of breath, wheezing  O2         Hypertension, essential     Monitor blood pressure           Weakness - Primary     Continue with OT, work towards goals          Medications, treatments, and labs reviewed  Continue medications and treatments as listed in EMR    Scribe Attestation  JANAY, Leslye Gregorio   attest that this documentation has been prepared under the direction and in the presence of  Corrie Desouza MD    Provider Attestation - Scribe documentation  All medical record entries made by the Scribe were at my direction and personally dictated by me. I have reviewed the chart and agree that the record accurately reflects my personal performance of the history, physical exam, discussion and plan.   Corrie Desouza MD            Electronically Signed By: Corrie Desouza MD   11/14/24 10:47 AM

## 2024-12-06 ENCOUNTER — NURSING HOME VISIT (OUTPATIENT)
Dept: POST ACUTE CARE | Facility: EXTERNAL LOCATION | Age: 89
End: 2024-12-06
Payer: MEDICARE

## 2024-12-06 DIAGNOSIS — I10 HYPERTENSION, ESSENTIAL: ICD-10-CM

## 2024-12-06 DIAGNOSIS — F32.A DEPRESSION, UNSPECIFIED DEPRESSION TYPE: ICD-10-CM

## 2024-12-06 DIAGNOSIS — J44.9 COPD WITHOUT EXACERBATION (MULTI): Primary | ICD-10-CM

## 2024-12-06 NOTE — PROGRESS NOTES
PROGRESS NOTE    Subjective   Chief complaint: Ruth Wheatley is a 89 y.o. female who is a long term care patient being seen and evaluated for monthly general medical care and follow-up    HPI:  HPI  Patient presents for general medical care and f/u.  Patient seen and examined at bedside.  No issues per nursing.  Patient has no acute complaints.  COPD stable, denies sob, wheezing, cough.  HTN BP at goal.  Denies chest pain and headache.  Patient with diagnosis of depression.  Mood is stable.  Denies feeling down and thoughts of harming self or others.  Mentation at baseline, no acute distress.    Objective   Vital signs: 128/72, 98.1, 66, 16, 96%    Physical Exam  Constitutional:       General: She is not in acute distress.  Eyes:      Extraocular Movements: Extraocular movements intact.   Cardiovascular:      Rate and Rhythm: Normal rate and regular rhythm.   Pulmonary:      Effort: Pulmonary effort is normal.      Breath sounds: Normal breath sounds.   Abdominal:      General: Bowel sounds are normal.      Palpations: Abdomen is soft.   Musculoskeletal:      Cervical back: Neck supple.      Right lower leg: No edema.      Left lower leg: No edema.   Neurological:      Mental Status: She is alert.   Psychiatric:         Mood and Affect: Mood normal.         Behavior: Behavior is cooperative.         Assessment/Plan   Problem List Items Addressed This Visit       COPD without exacerbation (Multi) - Primary     Continue inhaler and aerosol treatment  Monitor for shortness of breath, wheezing  O2         Depression     Monitor mood  Continue antidepressant as currently ordered         Hypertension, essential     Monitor blood pressure            Medications, treatments, and labs reviewed  Continue medications and treatments as listed in EMR    Scribe Attestation  JANAY, Selena Luna, Leslye   attest that this documentation has been prepared under the direction and in the presence of Corrie Desouza MD    Provider  Attestation - Scribe documentation  All medical record entries made by the Scribe were at my direction and personally dictated by me. I have reviewed the chart and agree that the record accurately reflects my personal performance of the history, physical exam, discussion and plan.   Corrie Desouza MD

## 2024-12-06 NOTE — LETTER
Patient: Ruth Wheatley  : 10/30/1929    Encounter Date: 2024    PROGRESS NOTE    Subjective  Chief complaint: Ruth Wheatley is a 89 y.o. female who is a long term care patient being seen and evaluated for monthly general medical care and follow-up    HPI:  HPI  Patient presents for general medical care and f/u.  Patient seen and examined at bedside.  No issues per nursing.  Patient has no acute complaints.  COPD stable, denies sob, wheezing, cough.  HTN BP at goal.  Denies chest pain and headache.  Patient with diagnosis of depression.  Mood is stable.  Denies feeling down and thoughts of harming self or others.  Mentation at baseline, no acute distress.    Objective  Vital signs: 128/72, 98.1, 66, 16, 96%    Physical Exam  Constitutional:       General: She is not in acute distress.  Eyes:      Extraocular Movements: Extraocular movements intact.   Cardiovascular:      Rate and Rhythm: Normal rate and regular rhythm.   Pulmonary:      Effort: Pulmonary effort is normal.      Breath sounds: Normal breath sounds.   Abdominal:      General: Bowel sounds are normal.      Palpations: Abdomen is soft.   Musculoskeletal:      Cervical back: Neck supple.      Right lower leg: No edema.      Left lower leg: No edema.   Neurological:      Mental Status: She is alert.   Psychiatric:         Mood and Affect: Mood normal.         Behavior: Behavior is cooperative.         Assessment/Plan  Problem List Items Addressed This Visit       COPD without exacerbation (Multi) - Primary     Continue inhaler and aerosol treatment  Monitor for shortness of breath, wheezing  O2         Depression     Monitor mood  Continue antidepressant as currently ordered         Hypertension, essential     Monitor blood pressure            Medications, treatments, and labs reviewed  Continue medications and treatments as listed in EMR    Scribe Attestation  I, Leslye Gregorio   attest that this documentation has been prepared under  the direction and in the presence of Corrie Desouza MD    Provider Attestation - Scribe documentation  All medical record entries made by the Scribe were at my direction and personally dictated by me. I have reviewed the chart and agree that the record accurately reflects my personal performance of the history, physical exam, discussion and plan.   Corrie Desouza MD            Electronically Signed By: Corrie Desouza MD   12/7/24 10:59 AM

## 2024-12-13 ENCOUNTER — NURSING HOME VISIT (OUTPATIENT)
Dept: POST ACUTE CARE | Facility: EXTERNAL LOCATION | Age: 89
End: 2024-12-13
Payer: MEDICARE

## 2024-12-13 DIAGNOSIS — R53.1 WEAKNESS: Primary | ICD-10-CM

## 2024-12-13 DIAGNOSIS — J44.9 COPD WITHOUT EXACERBATION (MULTI): ICD-10-CM

## 2024-12-13 DIAGNOSIS — I10 HYPERTENSION, ESSENTIAL: ICD-10-CM

## 2024-12-13 PROCEDURE — 99308 SBSQ NF CARE LOW MDM 20: CPT | Performed by: INTERNAL MEDICINE

## 2024-12-13 NOTE — PROGRESS NOTES
PROGRESS NOTE    Subjective   Chief complaint: Ruth Wheatley is a 89 y.o. female who is a long term care patient being seen and evaluated for weakness    HPI:  HPI  Patient has recently been evaluated by PT to establish plan of care and goals. Patient seen and examined at bedside, appears to be in no acute distress.  No new concerns today.  Denies n/v/f/c pain.  Nursing staff voicing no new concerns at this time.    Objective   Vital signs: 128/72, 98.1, 74, 16, 97%    Physical Exam  Constitutional:       General: She is not in acute distress.  Eyes:      Extraocular Movements: Extraocular movements intact.   Pulmonary:      Effort: Pulmonary effort is normal.      Comments:  O2  Musculoskeletal:      Cervical back: Neck supple.   Neurological:      Mental Status: She is alert.      Motor: Weakness present.   Psychiatric:         Mood and Affect: Mood normal.         Behavior: Behavior is cooperative.         Assessment/Plan   Problem List Items Addressed This Visit       COPD without exacerbation (Multi)     Continue inhaler and aerosol treatment  Monitor for shortness of breath, wheezing  O2         Hypertension, essential     Monitor blood pressure           Weakness - Primary     Evaluated by PT          Medications, treatments, and labs reviewed  Continue medications and treatments as listed in EMR    Scribe Attestation  I, Selena Luna Scribdesean   attest that this documentation has been prepared under the direction and in the presence of Corrie Desouza MD    Provider Attestation - Scribe documentation  All medical record entries made by the Scribe were at my direction and personally dictated by me. I have reviewed the chart and agree that the record accurately reflects my personal performance of the history, physical exam, discussion and plan.   Corrie Desouza MD

## 2024-12-13 NOTE — LETTER
Patient: Ruth Wheatley  : 10/30/1929    Encounter Date: 2024    PROGRESS NOTE    Subjective  Chief complaint: Ruth Wheatley is a 89 y.o. female who is a long term care patient being seen and evaluated for weakness    HPI:  HPI  Patient has recently been evaluated by PT to establish plan of care and goals. Patient seen and examined at bedside, appears to be in no acute distress.  No new concerns today.  Denies n/v/f/c pain.  Nursing staff voicing no new concerns at this time.    Objective  Vital signs: 128/72, 98.1, 74, 16, 97%    Physical Exam  Constitutional:       General: She is not in acute distress.  Eyes:      Extraocular Movements: Extraocular movements intact.   Pulmonary:      Effort: Pulmonary effort is normal.      Comments:  O2  Musculoskeletal:      Cervical back: Neck supple.   Neurological:      Mental Status: She is alert.      Motor: Weakness present.   Psychiatric:         Mood and Affect: Mood normal.         Behavior: Behavior is cooperative.         Assessment/Plan  Problem List Items Addressed This Visit       COPD without exacerbation (Multi)     Continue inhaler and aerosol treatment  Monitor for shortness of breath, wheezing  O2         Hypertension, essential     Monitor blood pressure           Weakness - Primary     Evaluated by PT          Medications, treatments, and labs reviewed  Continue medications and treatments as listed in EMR    Scribe Attestation  Selena GUSTAFSON Scribe   attest that this documentation has been prepared under the direction and in the presence of Corrie Desouza MD    Provider Attestation - Scribe documentation  All medical record entries made by the Scribe were at my direction and personally dictated by me. I have reviewed the chart and agree that the record accurately reflects my personal performance of the history, physical exam, discussion and plan.   Corrie Desouza MD            Electronically Signed By: Corrie Desouza MD   24 11:28  AM

## 2024-12-18 ENCOUNTER — NURSING HOME VISIT (OUTPATIENT)
Dept: POST ACUTE CARE | Facility: EXTERNAL LOCATION | Age: 89
End: 2024-12-18
Payer: MEDICARE

## 2024-12-18 DIAGNOSIS — J44.9 COPD WITHOUT EXACERBATION (MULTI): ICD-10-CM

## 2024-12-18 DIAGNOSIS — R53.1 WEAKNESS: Primary | ICD-10-CM

## 2024-12-18 DIAGNOSIS — I10 HYPERTENSION, ESSENTIAL: ICD-10-CM

## 2024-12-18 PROCEDURE — 99308 SBSQ NF CARE LOW MDM 20: CPT | Performed by: INTERNAL MEDICINE

## 2024-12-18 NOTE — LETTER
Patient: Ruth Wheatley  : 10/30/1929    Encounter Date: 2024    PROGRESS NOTE    Subjective  Chief complaint: Ruth Wheatley is a 89 y.o. female who is a long term care patient being seen and evaluated for weakness    HPI:  HPI  An interactive audio and/or video telecommunication system which permits real time communications between the patient (at the originating site) and provider (at a distant site) was utilized to provide this telehealth service after obtaining verbal consent.   Therapy is working with patient, patient is working towards goals. Therapy focusing on performing therapeutic exercise and activities, neuromuscular education, ADLs and self-care to improve range of motion and strengthening.  Patient appears to be in no acute distress.  Denies chest pain, shortness of breath, nausea vomiting fever chills.  Nursing voicing no new concerns.    Objective  Vital signs: 128/72, 98.1, 67, 18, 98%    Physical Exam  Constitutional:       General: She is not in acute distress.  Eyes:      Extraocular Movements: Extraocular movements intact.   Pulmonary:      Effort: Pulmonary effort is normal.      Comments:  O2  Musculoskeletal:      Cervical back: Neck supple.   Neurological:      Mental Status: She is alert.   Psychiatric:         Mood and Affect: Mood normal.         Behavior: Behavior is cooperative.         Assessment/Plan  Problem List Items Addressed This Visit       COPD without exacerbation (Multi)     Continue inhaler and aerosol treatment  Monitor for shortness of breath, wheezing  O2         Hypertension, essential     Monitor blood pressure           Weakness - Primary     Continue to work with PT          Medications, treatments, and labs reviewed  Continue medications and treatments as listed in EMR    Scribe Attestation  I, Leslye Gregorio   attest that this documentation has been prepared under the direction and in the presence of Corrie Desouza MD    Provider Attestation -  Scribe documentation  All medical record entries made by the Scribe were at my direction and personally dictated by me. I have reviewed the chart and agree that the record accurately reflects my personal performance of the history, physical exam, discussion and plan.   Corrie Desouza MD            Electronically Signed By: Corrie Dseouza MD   12/19/24 12:11 PM

## 2024-12-18 NOTE — PROGRESS NOTES
PROGRESS NOTE    Subjective   Chief complaint: Ruth Wheatley is a 89 y.o. female who is a long term care patient being seen and evaluated for weakness    HPI:  HPI  An interactive audio and/or video telecommunication system which permits real time communications between the patient (at the originating site) and provider (at a distant site) was utilized to provide this telehealth service after obtaining verbal consent.   Therapy is working with patient, patient is working towards goals. Therapy focusing on performing therapeutic exercise and activities, neuromuscular education, ADLs and self-care to improve range of motion and strengthening.  Patient appears to be in no acute distress.  Denies chest pain, shortness of breath, nausea vomiting fever chills.  Nursing voicing no new concerns.    Objective   Vital signs: 128/72, 98.1, 67, 18, 98%    Physical Exam  Constitutional:       General: She is not in acute distress.  Eyes:      Extraocular Movements: Extraocular movements intact.   Pulmonary:      Effort: Pulmonary effort is normal.      Comments:  O2  Musculoskeletal:      Cervical back: Neck supple.   Neurological:      Mental Status: She is alert.   Psychiatric:         Mood and Affect: Mood normal.         Behavior: Behavior is cooperative.         Assessment/Plan   Problem List Items Addressed This Visit       COPD without exacerbation (Multi)     Continue inhaler and aerosol treatment  Monitor for shortness of breath, wheezing  O2         Hypertension, essential     Monitor blood pressure           Weakness - Primary     Continue to work with PT          Medications, treatments, and labs reviewed  Continue medications and treatments as listed in EMR    Scribe Attestation  I, Lelsye Gregorio   attest that this documentation has been prepared under the direction and in the presence of Corrie Desouza MD    Provider Attestation - Scribe documentation  All medical record entries made by the Scribe were  at my direction and personally dictated by me. I have reviewed the chart and agree that the record accurately reflects my personal performance of the history, physical exam, discussion and plan.   Corrie Desouza MD

## 2024-12-18 NOTE — ASSESSMENT & PLAN NOTE
discharged from therapy  
 with chronic cough  Schedule cough syrup  
What Is The Reason For Today's Visit?: Annual Full Body Skin Examination with No Concerns
What Is The Reason For Today's Visit? (Being Monitored For X): the re-examination of lesions previously examined

## 2024-12-27 ENCOUNTER — NURSING HOME VISIT (OUTPATIENT)
Dept: POST ACUTE CARE | Facility: EXTERNAL LOCATION | Age: 89
End: 2024-12-27
Payer: MEDICARE

## 2024-12-27 DIAGNOSIS — J44.9 COPD WITHOUT EXACERBATION (MULTI): ICD-10-CM

## 2024-12-27 DIAGNOSIS — R53.1 WEAKNESS: Primary | ICD-10-CM

## 2024-12-27 DIAGNOSIS — I10 HYPERTENSION, ESSENTIAL: ICD-10-CM

## 2024-12-27 PROCEDURE — 99308 SBSQ NF CARE LOW MDM 20: CPT | Performed by: INTERNAL MEDICINE

## 2024-12-27 NOTE — PROGRESS NOTES
PROGRESS NOTE    Subjective   Chief complaint: Ruth Wheatley is a 89 y.o. female who is a long term care patient being seen and evaluated for therapy.    HPI:  HPI  Patient has begun working with therapy under [employer] benefits.  Patient is currently working with PT.  Therapy is working patient on gait training with use of a front wheel walker and CGA in wheelchair following and is able to ambulate up to 25 feet x 3.  Patient also performed seated exercises to improve strength and endurance.  Patient appears to be in no acute distress.  Denies chest pain, shortness of breath, nausea vomiting fever chills    Objective   Vital signs: 128/72, 98.1, 88, 18, 98%    Physical Exam  Constitutional:       General: She is not in acute distress.  Eyes:      Extraocular Movements: Extraocular movements intact.   Pulmonary:      Effort: Pulmonary effort is normal.      Comments:  O2  Musculoskeletal:      Cervical back: Neck supple.   Neurological:      Mental Status: She is alert.      Motor: Weakness present.   Psychiatric:         Mood and Affect: Mood normal.         Behavior: Behavior is cooperative.         Assessment/Plan   Problem List Items Addressed This Visit       COPD without exacerbation (Multi)     Continue inhaler and aerosol treatment  Monitor for shortness of breath, wheezing  O2         Hypertension, essential     Monitor blood pressure           Weakness - Primary     Continuing to progress towards goals with PT          Medications, treatments, and labs reviewed  Continue medications and treatments as listed in EMR    Scribe Attestation  I, Leslye Gregorio   attest that this documentation has been prepared under the direction and in the presence of Corrie Desouza MD    Provider Attestation - Scribe documentation  All medical record entries made by the Scribe were at my direction and personally dictated by me. I have reviewed the chart and agree that the record accurately reflects my personal  performance of the history, physical exam, discussion and plan.   Corrie Desouza MD

## 2024-12-27 NOTE — ASSESSMENT & PLAN NOTE
Continue inhaler and aerosol treatment  Monitor for shortness of breath, wheezing  O2  
Continuing to progress towards goals with PT  
Monitor blood pressure    
home management
home management

## 2024-12-27 NOTE — LETTER
Patient: Ruth Wheatley  : 10/30/1929    Encounter Date: 2024    PROGRESS NOTE    Subjective  Chief complaint: Ruth Wheatley is a 89 y.o. female who is a long term care patient being seen and evaluated for therapy.    HPI:  HPI  Patient has begun working with therapy under [employer] benefits.  Patient is currently working with PT.  Therapy is working patient on gait training with use of a front wheel walker and CGA in wheelchair following and is able to ambulate up to 25 feet x 3.  Patient also performed seated exercises to improve strength and endurance.  Patient appears to be in no acute distress.  Denies chest pain, shortness of breath, nausea vomiting fever chills    Objective  Vital signs: 128/72, 98.1, 88, 18, 98%    Physical Exam  Constitutional:       General: She is not in acute distress.  Eyes:      Extraocular Movements: Extraocular movements intact.   Pulmonary:      Effort: Pulmonary effort is normal.      Comments:  O2  Musculoskeletal:      Cervical back: Neck supple.   Neurological:      Mental Status: She is alert.      Motor: Weakness present.   Psychiatric:         Mood and Affect: Mood normal.         Behavior: Behavior is cooperative.         Assessment/Plan  Problem List Items Addressed This Visit       COPD without exacerbation (Multi)     Continue inhaler and aerosol treatment  Monitor for shortness of breath, wheezing  O2         Hypertension, essential     Monitor blood pressure           Weakness - Primary     Continuing to progress towards goals with PT          Medications, treatments, and labs reviewed  Continue medications and treatments as listed in EMR    Scribe Attestation  Selena GUSTAFSON Scribe   attest that this documentation has been prepared under the direction and in the presence of Corrie Desouza MD    Provider Attestation - Scribe documentation  All medical record entries made by the Scribe were at my direction and personally dictated by me. I have reviewed the  chart and agree that the record accurately reflects my personal performance of the history, physical exam, discussion and plan.   Corrie Desouza MD            Electronically Signed By: Corrie Desouza MD   12/28/24  1:20 PM